# Patient Record
Sex: FEMALE | Race: BLACK OR AFRICAN AMERICAN | Employment: FULL TIME | URBAN - METROPOLITAN AREA
[De-identification: names, ages, dates, MRNs, and addresses within clinical notes are randomized per-mention and may not be internally consistent; named-entity substitution may affect disease eponyms.]

---

## 2019-07-16 ENCOUNTER — OFFICE VISIT (OUTPATIENT)
Dept: FAMILY MEDICINE CLINIC | Age: 64
End: 2019-07-16

## 2019-07-16 ENCOUNTER — HOSPITAL ENCOUNTER (OUTPATIENT)
Dept: LAB | Age: 64
Discharge: HOME | End: 2019-07-16
Payer: COMMERCIAL

## 2019-07-16 VITALS
RESPIRATION RATE: 20 BRPM | HEIGHT: 66 IN | BODY MASS INDEX: 32.3 KG/M2 | SYSTOLIC BLOOD PRESSURE: 154 MMHG | DIASTOLIC BLOOD PRESSURE: 96 MMHG | WEIGHT: 201 LBS | TEMPERATURE: 98.1 F | HEART RATE: 80 BPM | OXYGEN SATURATION: 98 %

## 2019-07-16 DIAGNOSIS — R60.0 BILATERAL LOWER EXTREMITY EDEMA: Primary | ICD-10-CM

## 2019-07-16 DIAGNOSIS — I10 ESSENTIAL HYPERTENSION: ICD-10-CM

## 2019-07-16 DIAGNOSIS — E66.9 OBESITY, CLASS I, BMI 30-34.9: ICD-10-CM

## 2019-07-16 LAB
ALBUMIN SERPL-MCNC: 3.7 G/DL (ref 3.4–5)
ALBUMIN/GLOB SERPL: 0.8 {RATIO} (ref 0.8–1.7)
ALP SERPL-CCNC: 124 U/L (ref 45–117)
ALT SERPL-CCNC: 29 U/L (ref 13–56)
ANION GAP SERPL CALC-SCNC: 5 MMOL/L (ref 3–18)
AST SERPL-CCNC: 22 U/L (ref 15–37)
BILIRUB SERPL-MCNC: 0.2 MG/DL (ref 0.2–1)
BUN SERPL-MCNC: 12 MG/DL (ref 7–18)
BUN/CREAT SERPL: 13 (ref 12–20)
CALCIUM SERPL-MCNC: 9.4 MG/DL (ref 8.5–10.1)
CHLORIDE SERPL-SCNC: 104 MMOL/L (ref 100–108)
CHOLEST SERPL-MCNC: 188 MG/DL
CO2 SERPL-SCNC: 30 MMOL/L (ref 21–32)
CREAT SERPL-MCNC: 0.89 MG/DL (ref 0.6–1.3)
EST. AVERAGE GLUCOSE BLD GHB EST-MCNC: 140 MG/DL
GLOBULIN SER CALC-MCNC: 4.6 G/DL (ref 2–4)
GLUCOSE SERPL-MCNC: 77 MG/DL (ref 74–99)
HBA1C MFR BLD: 6.5 % (ref 4.2–5.6)
HDLC SERPL-MCNC: 49 MG/DL (ref 40–60)
HDLC SERPL: 3.8 {RATIO} (ref 0–5)
LDLC SERPL CALC-MCNC: 105.6 MG/DL (ref 0–100)
LIPID PROFILE,FLP: ABNORMAL
POTASSIUM SERPL-SCNC: 4.1 MMOL/L (ref 3.5–5.5)
PROT SERPL-MCNC: 8.3 G/DL (ref 6.4–8.2)
SODIUM SERPL-SCNC: 139 MMOL/L (ref 136–145)
TRIGL SERPL-MCNC: 167 MG/DL (ref ?–150)
VLDLC SERPL CALC-MCNC: 33.4 MG/DL

## 2019-07-16 PROCEDURE — 80061 LIPID PANEL: CPT

## 2019-07-16 PROCEDURE — 80053 COMPREHEN METABOLIC PANEL: CPT

## 2019-07-16 PROCEDURE — 83036 HEMOGLOBIN GLYCOSYLATED A1C: CPT

## 2019-07-16 PROCEDURE — 36415 COLL VENOUS BLD VENIPUNCTURE: CPT

## 2019-07-16 RX ORDER — TRIAMTERENE/HYDROCHLOROTHIAZID 37.5-25 MG
1 TABLET ORAL DAILY
Qty: 30 TAB | Refills: 1 | Status: SHIPPED | OUTPATIENT
Start: 2019-07-16 | End: 2019-07-17 | Stop reason: SDUPTHER

## 2019-07-16 RX ORDER — FUROSEMIDE 20 MG/1
20 TABLET ORAL DAILY
Refills: 0 | COMMUNITY
Start: 2019-06-19 | End: 2019-06-29

## 2019-07-16 NOTE — PROGRESS NOTES
HISTORY OF PRESENT ILLNESS  Noble Dalal is a 61 y.o. female. HPI   Patient presents today to establish care and also further evaluation of bilateral leg swelling. Reports she has noticed erythema to bilateral lower extremities for the last couple of days. If has been improving. Patient states she was seen at [facility] ED 6/30/19. Patient states does not have put excessive salt on her food. However, she does eat frozen food and fried foods. BNP POC (LAB) (06/18/2019 8:22 PM EDT)  BNP POC (LAB) (06/18/2019 8:22 PM EDT)   Component Value Ref Range Performed At Pathologist Signature   BNP POC <15 0 - 50 pg/mL [facility] LABORATORY      BASIC METABOLIC PANEL (41/24/9966 8:20 PM EDT)  BASIC METABOLIC PANEL (37/97/2776 8:20 PM EDT)   Component Value Ref Range Performed At Pathologist Signature   Potassium 3.7 3.5 - 5.5 mmol/L [facility] LABORATORY     Sodium 140 133 - 145 mmol/L [facility]     Chloride 103 98 - 110 mmol/L [facility]     Glucose 94 70 - 99 mg/dL [facility]     Calcium 9.6 8.4 - 10.5 mg/dL [facility]     BUN 18 6 - 22 mg/dL [facility]     Creatinine 0.7 (L) 0.8 - 1.4 mg/dL [facility]     CO2 25 20 - 32 mmol/L [facility]     eGFR  >60.0 >60.0 [facility]     eGFR Non African American >60.0 >60.0 [facility]     Anion Gap 11.8 mmol/L [facility]AT[facility]       No Known Allergies  Current Outpatient Medications   Medication Sig Dispense Refill    amLODIPine (NORVASC) 10 mg tablet Take  by mouth daily.  triamterene-hydrochlorothiazide (MAXZIDE) 75-50 mg per tablet Take  by mouth daily.        Past Medical History:   Diagnosis Date    Essential hypertension, benign      Social History     Socioeconomic History    Marital status:  Spouse name: Not on file    Number of children: Not on file    Years of education: Not on file    Highest education level: Not on file   Occupational History    Not on file   Social Needs    Financial resource strain: Not on file    Food insecurity:     Worry: Not on file     Inability: Not on file    Transportation needs:     Medical: Not on file     Non-medical: Not on file   Tobacco Use    Smoking status: Never Smoker    Smokeless tobacco: Never Used   Substance and Sexual Activity    Alcohol use: No    Drug use: Never    Sexual activity: Not on file   Lifestyle    Physical activity:     Days per week: Not on file     Minutes per session: Not on file    Stress: Not on file   Relationships    Social connections:     Talks on phone: Not on file     Gets together: Not on file     Attends Sikhism service: Not on file     Active member of club or organization: Not on file     Attends meetings of clubs or organizations: Not on file     Relationship status: Not on file    Intimate partner violence:     Fear of current or ex partner: Not on file     Emotionally abused: Not on file     Physically abused: Not on file     Forced sexual activity: Not on file   Other Topics Concern    Not on file   Social History Narrative    Not on file     Wt Readings from Last 3 Encounters:   07/16/19 201 lb (91.2 kg)   12/17/09 191 lb (86.6 kg)     BP Readings from Last 3 Encounters:   07/16/19 163/79   12/17/09 (!) 140/94     Review of Systems   Constitutional: Negative for chills and fever. Respiratory: Negative for shortness of breath. Cardiovascular: Positive for leg swelling (bilateral lower extremities ). Negative for chest pain and palpitations. Neurological: Negative for dizziness and headaches.      /79 (BP 1 Location: Left arm, BP Patient Position: Sitting)   Pulse 80   Temp 98.1 °F (36.7 °C) (Oral)   Resp 20   Ht 5' 6\" (1.676 m)   Wt 201 lb (91.2 kg)   SpO2 98%   BMI 32.44 kg/m² Physical Exam   Constitutional: She is oriented to person, place, and time. She appears well-developed and well-nourished. No distress. HENT:   Head: Normocephalic and atraumatic. Neck: Normal range of motion. Neck supple. Cardiovascular: Normal rate, regular rhythm and normal heart sounds. Exam reveals no gallop and no friction rub. No murmur heard. Pulmonary/Chest: Effort normal and breath sounds normal. She has no wheezes. She has no rhonchi. She has no rales. Abdominal: Soft. Bowel sounds are normal. There is no tenderness. Musculoskeletal: She exhibits edema (bilateral lower extremities ). Lymphadenopathy:     She has no cervical adenopathy. Neurological: She is alert and oriented to person, place, and time. Skin: Skin is warm and dry. Psychiatric: She has a normal mood and affect. Her behavior is normal. Judgment and thought content normal.       ASSESSMENT and PLAN    ICD-10-CM ICD-9-CM    1. Bilateral lower extremity edema R60.0 782.3    2. Essential hypertension I10 401.9 LIPID PANEL      METABOLIC PANEL, COMPREHENSIVE   3. Obesity, Class I, BMI 30-34.9 E66.9 278.00 HEMOGLOBIN A1C WITH EAG     Orders Placed This Encounter    LIPID PANEL    METABOLIC PANEL, COMPREHENSIVE    HEMOGLOBIN A1C WITH EAG    furosemide (LASIX) 20 mg tablet    DISCONTD: triamterene-hydroCHLOROthiazide (MAXZIDE) 37.5-25 mg per tablet    triamterene-hydroCHLOROthiazide (MAXZIDE) 37.5-25 mg per tablet     I have discussed the diagnosis with the patient and the intended plan as seen in the above orders. The patient has received an after-visit summary and questions were answered concerning future plans. I have discussed medication side effects and warnings with the patient as well. Patient agreeable with above plan and verbalizes understanding. Follow-up and Dispositions    · Return in about 2 weeks (around 7/30/2019) for HTN/lower leg edema.

## 2019-07-16 NOTE — PROGRESS NOTES
Chief Complaint   Patient presents with   [facility] Follow Up     [facility] ER follow up. Bilateral leg swelling. was given 10 day supply of Lasix. 1. Have you been to the ER, urgent care clinic since your last visit? Hospitalized since your last visit?here for follow up    2. Have you seen or consulted any other health care providers outside of the [address] [place] [address] since your last visit? Include any pap smears or colon screening. Learning assessment completed  Abuse screening completed  PHq screening completed.

## 2019-07-16 NOTE — PATIENT INSTRUCTIONS
Low Sodium Diet (2,000 Milligram): Care Instructions  Your Care Instructions    Too much sodium causes your body to hold on to extra water. This can raise your blood pressure and force your heart and kidneys to work harder. In very serious cases, this could cause you to be put in the hospital. It might even be life-threatening. By limiting sodium, you will feel better and lower your risk of serious problems. The most common source of sodium is salt. People get most of the salt in their diet from canned, prepared, and packaged foods. Fast food and restaurant meals also are very high in sodium. Your doctor will probably limit your sodium to less than 2,000 milligrams (mg) a day. This limit counts all the sodium in prepared and packaged foods and any salt you add to your food. Follow-up care is a key part of your treatment and safety. Be sure to make and go to all appointments, and call your doctor if you are having problems. It's also a good idea to know your test results and keep a list of the medicines you take. How can you care for yourself at home? Read food labels  · Read labels on cans and food packages. The labels tell you how much sodium is in each serving. Make sure that you look at the serving size. If you eat more than the serving size, you have eaten more sodium. · Food labels also tell you the Percent Daily Value for sodium. Choose products with low Percent Daily Values for sodium. · Be aware that sodium can come in forms other than salt, including monosodium glutamate (MSG), sodium citrate, and sodium bicarbonate (baking soda). MSG is often added to Asian food. When you eat out, you can sometimes ask for food without MSG or added salt. Buy low-sodium foods  · Buy foods that are labeled \"unsalted\" (no salt added), \"sodium-free\" (less than 5 mg of sodium per serving), or \"low-sodium\" (less than 140 mg of sodium per serving).  Foods labeled \"reduced-sodium\" and \"light sodium\" may still have too much sodium. Be sure to read the label to see how much sodium you are getting. · Buy fresh vegetables, or frozen vegetables without added sauces. Buy low-sodium versions of canned vegetables, soups, and other canned goods. Prepare low-sodium meals  · Cut back on the amount of salt you use in cooking. This will help you adjust to the taste. Do not add salt after cooking. One teaspoon of salt has about 2,300 mg of sodium. · Take the salt shaker off the table. · Flavor your food with garlic, lemon juice, onion, vinegar, herbs, and spices. Do not use soy sauce, lite soy sauce, steak sauce, onion salt, garlic salt, celery salt, mustard, or ketchup on your food. · Use low-sodium salad dressings, sauces, and ketchup. Or make your own salad dressings and sauces without adding salt. · Use less salt (or none) when recipes call for it. You can often use half the salt a recipe calls for without losing flavor. Other foods such as rice, pasta, and grains do not need added salt. · Rinse canned vegetables, and cook them in fresh water. This removes some--but not all--of the salt. · Avoid water that is naturally high in sodium or that has been treated with water softeners, which add sodium. Call your local water company to find out the sodium content of your water supply. If you buy bottled water, read the label and choose a sodium-free brand. Avoid high-sodium foods  · Avoid eating:  ? Smoked, cured, salted, and canned meat, fish, and poultry. ? Ham, gibson, hot dogs, and luncheon meats. ? Regular, hard, and processed cheese and regular peanut butter. ? Crackers with salted tops, and other salted snack foods such as pretzels, chips, and salted popcorn. ? Frozen prepared meals, unless labeled low-sodium. ? Canned and dried soups, broths, and bouillon, unless labeled sodium-free or low-sodium. ? Canned vegetables, unless labeled sodium-free or low-sodium. ? Western Jeanne fries, pizza, tacos, and other fast foods.   ? Becky Bernabe olives, ketchup, and other condiments, especially soy sauce, unless labeled sodium-free or low-sodium. Where can you learn more? Go to http://georgi-xuan.info/. Enter H470 in the search box to learn more about \"Low Sodium Diet (2,000 Milligram): Care Instructions. \"  Current as of: March 28, 2018  Content Version: 11.9  © 9864-0643 [employer]. Care instructions adapted under license by [employer] (which disclaims liability or warranty for this information). If you have questions about a medical condition or this instruction, always ask your healthcare professional. [address] any warranty or liability for your use of this information. How to Read a Food Label to Limit Sodium: Care Instructions  Your Care Instructions  Sodium causes your body to hold on to extra water. This can raise your blood pressure and force your heart and kidneys to work harder. In very serious cases, this could cause you to be put in the hospital. It might even be life-threatening. By limiting sodium, you will feel better and lower your risk of serious problems. Processed foods, fast food, and restaurant foods are the major sources of dietary sodium. The most common name for sodium is salt. Try to limit how much sodium you eat to less than 2,300 milligrams (mg) a day. If you limit your sodium to 1,500 mg a day, you can lower your blood pressure even more. This limit counts all the salt that you eat in foods you cook or in packaged foods. Keep a list of everything you eat and drink. Follow-up care is a key part of your treatment and safety. Be sure to make and go to all appointments, and call your doctor if you are having problems. It's also a good idea to know your test results and keep a list of the medicines you take. How can you care for yourself at home?   Read ingredient lists on food labels  · Read the list of ingredients on food labels to help you find how much sodium is in a food. The label lists the ingredients in a food in descending order (from the most to the least). If salt or sodium is high on the list, there may be a lot of sodium in the food. · Know that sodium has different names. Sodium is also called monosodium glutamate (MSG, common in [place] food), sodium citrate, sodium alginate, sodium hydroxide, and sodium phosphate. Read Nutrition Facts labels  · On most foods, there is a Nutrition Facts label. This will tell you how much sodium is in one serving of food. Look at both the serving size and the sodium amount. The serving size is located at the top of the label, usually right under the \"Nutrition Facts\" title. The amount of sodium is given in the list under the title. It is given in milligrams (mg). ? Check the serving size carefully. A single serving is often very small, and you may eat more than one serving. If this is the case, you will eat more sodium than listed on the label. For example, if the serving size for a canned soup is 1 cup and the sodium amount is 470 mg, if you have 2 cups you will eat 940 mg of sodium. · The nutrition facts for fresh fruits and vegetables are not listed on the food. They may be listed somewhere in the store. These foods usually have no sodium or low sodium. · The Nutrition Facts label also gives you the Percent Daily Value for sodium. This is how much of the recommended amount of sodium a serving contains. The daily value for sodium is less than 2,300 mg. So if the Percent Daily Value says 50%, this means one serving is giving you half of this, or 1,150 mg. Buy low-sodium foods  · Look for foods that are made with less sodium. Watch for the following words on the label. ? \"Unsalted\" means there is no sodium added to the food. But there may be sodium already in the food naturally. ? \"Sodium-free\" means a serving has less than 5 mg of sodium. ?  \"Very low sodium\" means a serving has 35 mg or less of sodium. ? \"Low-sodium\" means a serving has 140 mg or less of sodium. · \"Reduced-sodium\" means that there is 25% less sodium than what the food normally has. This is still usually too much sodium. Try not to buy foods with this on the label. · Buy fresh vegetables, or frozen vegetables without added sauces. Buy low-sodium versions of canned vegetables, soups, and other canned goods. Where can you learn more? Go to http://georgi-xuan.info/. Enter 26 [id number] in the search box to learn more about \"How to Read a Food Label to Limit Sodium: Care Instructions. \"  Current as of: March 28, 2018  Content Version: 11.9  © 0731-4632 [employer]. Care instructions adapted under license by [employer] (which disclaims liability or warranty for this information). If you have questions about a medical condition or this instruction, always ask your healthcare professional. [place] [address] any warranty or liability for your use of this information. Leg and Ankle Edema: Care Instructions  Your Care Instructions  Swelling in the legs, ankles, and feet is called edema. It is common after you sit or stand for a while. Long plane flights or car rides often cause swelling in the legs and feet. You may also have swelling if you have to stand for long periods of time at your job. Problems with the veins in the legs (varicose veins) and changes in hormones can also cause swelling. Sometimes the swelling in the ankles and feet is caused by a more serious problem, such as heart failure, infection, blood clots, or liver or kidney disease. Follow-up care is a key part of your treatment and safety. Be sure to make and go to all appointments, and call your doctor if you are having problems. It's also a good idea to know your test results and keep a list of the medicines you take. How can you care for yourself at home? · If your doctor gave you medicine, take it as prescribed. Call your doctor if you think you are having a problem with your medicine. · Whenever you are resting, raise your legs up. Try to keep the swollen area higher than the level of your heart. · Take breaks from standing or sitting in one position. ? Walk around to increase the blood flow in your lower legs. ? Move your feet and ankles often while you stand, or tighten and relax your leg muscles. · Wear support stockings. Put them on in the morning, before swelling gets worse. · Eat a balanced diet. Lose weight if you need to. · Limit the amount of salt (sodium) in your diet. Salt holds fluid in the body and may increase swelling. When should you call for help? Call 911 anytime you think you may need emergency care. For example, call if:    · You have symptoms of a blood clot in your lung (called a pulmonary embolism). These may include:  ? Sudden chest pain. ? Trouble breathing. ? Coughing up blood.    Call your doctor now or seek immediate medical care if:    · You have signs of a blood clot, such as:  ? Pain in your calf, back of the knee, thigh, or groin. ? Redness and swelling in your leg or groin.     · You have symptoms of infection, such as:  ? Increased pain, swelling, warmth, or redness. ? Red streaks or pus. ? A fever.    Watch closely for changes in your health, and be sure to contact your doctor if:    · Your swelling is getting worse.     · You have new or worsening pain in your legs.     · You do not get better as expected. Where can you learn more? Go to http://georgi-xuan.info/. Enter A801 in the search box to learn more about \"Leg and Ankle Edema: Care Instructions. \"  Current as of: September 23, 2018  Content Version: 11.9  © 6437-5459 [employer]. Care instructions adapted under license by [employer] (which disclaims liability or warranty for this information).  If you have questions about a medical condition or this instruction, always ask your healthcare professional. [place] [address] any warranty or liability for your use of this information.

## 2019-07-17 RX ORDER — TRIAMTERENE/HYDROCHLOROTHIAZID 37.5-25 MG
1 TABLET ORAL DAILY
Qty: 90 TAB | Refills: 0 | Status: SHIPPED | OUTPATIENT
Start: 2019-07-17 | End: 2019-10-28 | Stop reason: SDUPTHER

## 2019-08-06 ENCOUNTER — HOSPITAL ENCOUNTER (OUTPATIENT)
Dept: GENERAL RADIOLOGY | Age: 64
Discharge: HOME | End: 2019-08-06
Payer: COMMERCIAL

## 2019-08-06 ENCOUNTER — OFFICE VISIT (OUTPATIENT)
Dept: FAMILY MEDICINE CLINIC | Age: 64
End: 2019-08-06

## 2019-08-06 VITALS
DIASTOLIC BLOOD PRESSURE: 79 MMHG | BODY MASS INDEX: 31.82 KG/M2 | WEIGHT: 198 LBS | HEART RATE: 80 BPM | RESPIRATION RATE: 17 BRPM | HEIGHT: 66 IN | OXYGEN SATURATION: 98 % | TEMPERATURE: 98.1 F | SYSTOLIC BLOOD PRESSURE: 149 MMHG

## 2019-08-06 DIAGNOSIS — R73.09 ELEVATED HEMOGLOBIN A1C: ICD-10-CM

## 2019-08-06 DIAGNOSIS — G89.29 CHRONIC BILATERAL THORACIC BACK PAIN: ICD-10-CM

## 2019-08-06 DIAGNOSIS — I10 ESSENTIAL HYPERTENSION: Primary | ICD-10-CM

## 2019-08-06 DIAGNOSIS — M54.6 CHRONIC BILATERAL THORACIC BACK PAIN: ICD-10-CM

## 2019-08-06 PROCEDURE — 72072 X-RAY EXAM THORAC SPINE 3VWS: CPT

## 2019-08-06 RX ORDER — IBUPROFEN 800 MG/1
800 TABLET ORAL
Qty: 45 TAB | Refills: 0 | Status: SHIPPED | OUTPATIENT
Start: 2019-08-06 | End: 2021-12-16

## 2019-08-06 NOTE — LETTER
8/6/2019 9:45 AM 
 
Ms. Josef Roldan [address] [address] [address] [address] To Whom It May Concern: 
 
Jose[facility]an [facility] the [facility] Please be advised patient will benefit from wearing closed toe clog while working due to bilateral lower leg/feet swelling. If there are questions or concerns please have the patient contact our office.  
 
 
 
Sincerely, 
 
 
Camelia Telles NP

## 2019-08-06 NOTE — PROGRESS NOTES
Leonarda First presents today for   Chief Complaint   Patient presents with    Hypertension    Swelling       Is someone accompanying this pt? no    Is the patient using any DME equipment during OV? no    Depression Screening:  3 most recent PHQ Screens 7/16/2019   Little interest or pleasure in doing things Not at all   Feeling down, depressed, irritable, or hopeless Not at all   Total Score PHQ 2 0       Learning Assessment:  Learning Assessment 7/16/2019   PRIMARY LEARNER Patient   HIGHEST LEVEL OF EDUCATION - PRIMARY LEARNER  GRADUATED HIGH SCHOOL OR GED   BARRIERS PRIMARY LEARNER NONE   CO-LEARNER CAREGIVER No   PRIMARY LANGUAGE ENGLISH   LEARNER PREFERENCE PRIMARY DEMONSTRATION   ANSWERED BY Patient   RELATIONSHIP SELF       Abuse Screening:  Abuse Screening Questionnaire 7/16/2019   Do you ever feel afraid of your partner? N   Are you in a relationship with someone who physically or mentally threatens you? N   Is it safe for you to go home? Y       Fall Risk  No flowsheet data found. Health Maintenance reviewed and discussed and ordered per Provider. Health Maintenance Due   Topic Date Due    Hepatitis C Screening  1955    FOOT EXAM Q1  12/17/1965    MICROALBUMIN Q1  12/17/1965    EYE EXAM RETINAL OR DILATED  12/17/1965    DTaP/Tdap/Td series (1 - Tdap) 12/17/1976    PAP AKA CERVICAL CYTOLOGY  12/17/1976    Shingrix Vaccine Age 50> (1 of 2) 12/17/2005    FOBT Q 1 YEAR AGE 50-75  12/17/2005    BREAST CANCER SCRN MAMMOGRAM  01/27/2014    Influenza Age 9 to Adult  08/01/2019       Coordination of Care:  1. Have you been to the ER, urgent care clinic since your last visit? Hospitalized since your last visit? no    2. Have you seen or consulted any other health care providers outside of the [address] [place] [address] since your last visit? Include any pap smears or colon screening.  no

## 2019-08-06 NOTE — PATIENT INSTRUCTIONS
Learning About Low-Carbohydrate Diets for Weight Loss  What is a low-carbohydrate diet? Low-carb diets avoid foods that are high in carbohydrate. These high-carb foods include pasta, bread, rice, cereal, fruits, and starchy vegetables. Instead, these diets usually have you eat foods that are high in fat and protein. Many people lose weight quickly on a low-carb diet. But the early weight loss is water. People on this diet often gain the weight back after they start eating carbs again. Not all diet plans are safe or work well. A lot of the evidence shows that low-carb diets aren't healthy. That's because these diets often don't include healthy foods like fruits and vegetables. Losing weight safely means balancing protein, fat, and carbs with every meal and snack. And low-carb diets don't always provide the vitamins, minerals, and fiber you need. If you have a serious medical condition, talk to your doctor before you try any diet. These conditions include kidney disease, heart disease, type 2 diabetes, high cholesterol, and high blood pressure. If you are pregnant, it may not be safe for your baby if you are on a low-carb diet. How can you lose weight safely? You might have heard that a diet plan helped another person lose weight. But that doesn't mean that it will work for you. It is very hard to stay on a diet that includes lots of big changes in your eating habits. If you want to get to a healthy weight and stay there, making healthy lifestyle changes will often work better than dieting. These steps can help. · Make a plan for change. Work with your doctor to create a plan that is right for you. · See a dietitian. He or she can show you how to make healthy changes in your eating habits. · Manage stress. If you have a lot of stress in your life, it can be hard to focus on making healthy changes to your daily habits. · Track your food and activity.  You are likely to do better at losing weight if you keep track of what you eat and what you do. Follow-up care is a key part of your treatment and safety. Be sure to make and go to all appointments, and call your doctor if you are having problems. It's also a good idea to know your test results and keep a list of the medicines you take. Where can you learn more? Go to http://georgi-xuan.info/. Enter A121 in the search box to learn more about \"Learning About Low-Carbohydrate Diets for Weight Loss. \"  Current as of: November 7, 2018  Content Version: 12.1  © 8845-1185 [employer]. Care instructions adapted under license by [employer] (which disclaims liability or warranty for this information). If you have questions about a medical condition or this instruction, always ask your healthcare professional. [address] any warranty or liability for your use of this information.

## 2019-08-12 ENCOUNTER — TELEPHONE (OUTPATIENT)
Dept: FAMILY MEDICINE CLINIC | Age: 64
End: 2019-08-12

## 2019-08-12 NOTE — TELEPHONE ENCOUNTER
Patient came into the office today requesting xray results. Advised patient that arthritis and mild scoliosis was noted. She verbalized understanding.  Copy of results given to the patient per her request. Patient left ambulatory with no complaints of pain or distress noted at this time

## 2019-08-12 NOTE — TELEPHONE ENCOUNTER
Result Notes for XR SPINE [address] 3 V     Notes recorded by Danyell Whittington NP on 8/8/2019 at 7:31 AM EDT  Please inform patient her xray reveals she has arthritis and also mild scoliosis.  Thanks!

## 2019-10-10 ENCOUNTER — OFFICE VISIT (OUTPATIENT)
Dept: FAMILY MEDICINE CLINIC | Age: 64
End: 2019-10-10

## 2019-10-10 VITALS
OXYGEN SATURATION: 98 % | HEIGHT: 66 IN | RESPIRATION RATE: 17 BRPM | HEART RATE: 80 BPM | TEMPERATURE: 98.1 F | BODY MASS INDEX: 31.82 KG/M2 | DIASTOLIC BLOOD PRESSURE: 83 MMHG | SYSTOLIC BLOOD PRESSURE: 137 MMHG | WEIGHT: 198 LBS

## 2019-10-10 DIAGNOSIS — R73.09 ELEVATED HEMOGLOBIN A1C: ICD-10-CM

## 2019-10-10 DIAGNOSIS — I10 ESSENTIAL HYPERTENSION: ICD-10-CM

## 2019-10-10 DIAGNOSIS — R60.0 BILATERAL LOWER EXTREMITY EDEMA: Primary | ICD-10-CM

## 2019-10-10 DIAGNOSIS — E66.9 OBESITY, CLASS I, BMI 30-34.9: ICD-10-CM

## 2019-10-10 RX ORDER — DICLOFENAC SODIUM 10 MG/G
GEL TOPICAL 4 TIMES DAILY
Qty: 100 G | Refills: 0 | Status: SHIPPED | OUTPATIENT
Start: 2019-10-10

## 2019-10-10 NOTE — PROGRESS NOTES
Subjective:   Suri Boswell is a 61 y.o. female with hypertension presents for follow up. States she does continue to have swelling. Comments she does wear her support stockings with some improvement. Patient states since her last visit she had to go to the emergency department for bilateral lower leg edema. Dx with venous insuffiencey. Patient states she has been attempting to adhere to low sodium diet. She has not been exercising. There is no problem list on file for this patient. Current Outpatient Medications   Medication Sig Dispense Refill    ibuprofen (MOTRIN) 800 mg tablet Take 1 Tab by mouth every eight (8) hours as needed for Pain (with food). 45 Tab 0    triamterene-hydroCHLOROthiazide (MAXZIDE) 37.5-25 mg per tablet Take 1 Tab by mouth daily. 90 Tab 0      No Known Allergies  No past surgical history on file. Family History   Problem Relation Age of Onset    Hypertension Unknown       Lab Results   Component Value Date/Time    Cholesterol, total 188 07/16/2019 12:35 PM    HDL Cholesterol 49 07/16/2019 12:35 PM    LDL, calculated 105.6 (H) 07/16/2019 12:35 PM    VLDL, calculated 33.4 07/16/2019 12:35 PM    Triglyceride 167 (H) 07/16/2019 12:35 PM    CHOL/HDL Ratio 3.8 07/16/2019 12:35 PM     Lab Results   Component Value Date/Time    Sodium 139 07/16/2019 12:35 PM    Potassium 4.1 07/16/2019 12:35 PM    Chloride 104 07/16/2019 12:35 PM    CO2 30 07/16/2019 12:35 PM    Anion gap 5 07/16/2019 12:35 PM    Glucose 77 07/16/2019 12:35 PM    BUN 12 07/16/2019 12:35 PM    Creatinine 0.89 07/16/2019 12:35 PM    BUN/Creatinine ratio 13 07/16/2019 12:35 PM    GFR est AA >60 07/16/2019 12:35 PM    GFR est non-AA >60 07/16/2019 12:35 PM    Calcium 9.4 07/16/2019 12:35 PM    Bilirubin, total 0.2 07/16/2019 12:35 PM    AST (SGOT) 22 07/16/2019 12:35 PM    Alk.  phosphatase 124 (H) 07/16/2019 12:35 PM    Protein, total 8.3 (H) 07/16/2019 12:35 PM    Albumin 3.7 07/16/2019 12:35 PM    Globulin 4.6 (H) 07/16/2019 12:35 PM    A-G Ratio 0.8 07/16/2019 12:35 PM    ALT (SGPT) 29 07/16/2019 12:35 PM     Lab Results   Component Value Date/Time    Hemoglobin A1c 6.5 (H) 07/16/2019 12:35 PM     Wt Readings from Last 3 Encounters:   10/10/19 198 lb (89.8 kg)   08/06/19 198 lb (89.8 kg)   07/16/19 201 lb (91.2 kg)     BP Readings from Last 3 Encounters:   10/10/19 137/83   08/06/19 149/79   07/16/19 (!) 154/96       Hypertension ROS: taking medications as instructed, no medication side effects noted, no TIA's, no chest pain on exertion, no dyspnea on exertion, no swelling of ankles. Review of Systems - Musculoskeletal ROS: positive for - pain in back - mid    New concerns: states she does continue to have pain in her thoracic/lower back region. Objective:   Visit Vitals  /83   Pulse 80   Temp 98.1 °F (36.7 °C) (Oral)   Resp 17   Ht 5' 6\" (1.676 m)   Wt 198 lb (89.8 kg)   SpO2 98%   BMI 31.96 kg/m²      General appearance - alert, well appearing, and in no distress  Neck - supple, no significant adenopathy, carotids upstroke normal bilaterally, no bruits  Chest - clear to auscultation, no wheezes, rales or rhonchi, symmetric air entry  Heart - normal rate, regular rhythm, normal S1, S2, no murmurs, rubs, clicks or gallops  Extremities - peripheral pulses normal, no clubbing or cyanosis, bilateral lower extremity edema          Assessment/Plan:      ICD-10-CM ICD-9-CM    1. Bilateral lower extremity edema R60.0 782.3    2. Elevated hemoglobin A1c R73.09 790.29 AMB POC HEMOGLOBIN A1C   3. Essential hypertension I10 401.9    4. Obesity, Class I, BMI 30-34.9 E66.9 278.00      Orders Placed This Encounter    AMB POC HEMOGLOBIN A1C    diclofenac (VOLTAREN) 1 % gel       current treatment plan is effective, no change in therapy  reviewed diet, exercise and weight control  recommended sodium restriction. I have discussed the diagnosis with the patient and the intended plan as seen in the above orders.   The patient has received an after-visit summary and questions were answered concerning future plans. I have discussed medication side effects and warnings with the patient as well. Patient agreeable with above plan and verbalizes understanding. Follow-up and Dispositions    · Return in about 3 months (around 1/10/2020) for HTN/edema.

## 2019-10-10 NOTE — PROGRESS NOTES
Benito Steen presents today for   Chief Complaint   Patient presents with    Hypertension    Blood sugar problem       Is someone accompanying this pt? no    Is the patient using any DME equipment during OV? no    Depression Screening:  3 most recent PHQ Screens 7/16/2019   Little interest or pleasure in doing things Not at all   Feeling down, depressed, irritable, or hopeless Not at all   Total Score PHQ 2 0       Learning Assessment:  Learning Assessment 7/16/2019   PRIMARY LEARNER Patient   HIGHEST LEVEL OF EDUCATION - PRIMARY LEARNER  GRADUATED HIGH SCHOOL OR GED   BARRIERS PRIMARY LEARNER NONE   CO-LEARNER CAREGIVER No   PRIMARY LANGUAGE ENGLISH   LEARNER PREFERENCE PRIMARY DEMONSTRATION   ANSWERED BY Patient   RELATIONSHIP SELF       Abuse Screening:  Abuse Screening Questionnaire 7/16/2019   Do you ever feel afraid of your partner? N   Are you in a relationship with someone who physically or mentally threatens you? N   Is it safe for you to go home? Y       Fall Risk  No flowsheet data found. Health Maintenance reviewed and discussed and ordered per Provider. Health Maintenance Due   Topic Date Due    Hepatitis C Screening  1955    FOOT EXAM Q1  12/17/1965    MICROALBUMIN Q1  12/17/1965    EYE EXAM RETINAL OR DILATED  12/17/1965    DTaP/Tdap/Td series (1 - Tdap) 12/17/1976    PAP AKA CERVICAL CYTOLOGY  12/17/1976    Shingrix Vaccine Age 50> (1 of 2) 12/17/2005    FOBT Q 1 YEAR AGE 50-75  12/17/2005    BREAST CANCER SCRN MAMMOGRAM  01/27/2014    Influenza Age 9 to Adult  08/01/2019           Coordination of Care:  1. Have you been to the ER, urgent care clinic since your last visit? Hospitalized since your last visit? no    2. Have you seen or consulted any other health care providers outside of the [address] [place] [address] since your last visit? Include any pap smears or colon screening.  no

## 2019-10-10 NOTE — PATIENT INSTRUCTIONS

## 2019-10-11 LAB — HBA1C MFR BLD HPLC: 5.5 %

## 2019-10-28 RX ORDER — TRIAMTERENE/HYDROCHLOROTHIAZID 37.5-25 MG
1 TABLET ORAL DAILY
Qty: 90 TAB | Refills: 0 | Status: SHIPPED | OUTPATIENT
Start: 2019-10-28 | End: 2020-01-09 | Stop reason: SDUPTHER

## 2019-10-28 NOTE — TELEPHONE ENCOUNTER
Requested Prescriptions     Pending Prescriptions Disp Refills    triamterene-hydroCHLOROthiazide (MAXZIDE) 37.5-25 mg per tablet 90 Tab 0     Sig: Take 1 Tab by mouth daily.

## 2019-12-09 RX ORDER — TRIAMTERENE/HYDROCHLOROTHIAZID 37.5-25 MG
1 TABLET ORAL DAILY
Qty: 90 TAB | Refills: 0 | Status: CANCELLED | OUTPATIENT
Start: 2019-12-09

## 2020-01-09 ENCOUNTER — HOSPITAL ENCOUNTER (OUTPATIENT)
Dept: LAB | Age: 65
Discharge: HOME | End: 2020-01-09
Payer: COMMERCIAL

## 2020-01-09 ENCOUNTER — OFFICE VISIT (OUTPATIENT)
Dept: FAMILY MEDICINE CLINIC | Age: 65
End: 2020-01-09

## 2020-01-09 VITALS
DIASTOLIC BLOOD PRESSURE: 84 MMHG | RESPIRATION RATE: 18 BRPM | BODY MASS INDEX: 32.14 KG/M2 | WEIGHT: 200 LBS | SYSTOLIC BLOOD PRESSURE: 144 MMHG | HEIGHT: 66 IN | HEART RATE: 85 BPM | TEMPERATURE: 98.3 F | OXYGEN SATURATION: 98 %

## 2020-01-09 DIAGNOSIS — R73.09 ELEVATED HEMOGLOBIN A1C: ICD-10-CM

## 2020-01-09 DIAGNOSIS — I10 ESSENTIAL HYPERTENSION: ICD-10-CM

## 2020-01-09 DIAGNOSIS — R60.0 BILATERAL LOWER EXTREMITY EDEMA: ICD-10-CM

## 2020-01-09 DIAGNOSIS — I10 ESSENTIAL HYPERTENSION: Primary | ICD-10-CM

## 2020-01-09 DIAGNOSIS — J00 ACUTE NASOPHARYNGITIS: ICD-10-CM

## 2020-01-09 LAB
ALBUMIN SERPL-MCNC: 3.4 G/DL (ref 3.4–5)
ALBUMIN/GLOB SERPL: 0.7 {RATIO} (ref 0.8–1.7)
ALP SERPL-CCNC: 104 U/L (ref 45–117)
ALT SERPL-CCNC: 30 U/L (ref 13–56)
ANION GAP SERPL CALC-SCNC: 3 MMOL/L (ref 3–18)
AST SERPL-CCNC: 26 U/L (ref 10–38)
BILIRUB SERPL-MCNC: 0.3 MG/DL (ref 0.2–1)
BUN SERPL-MCNC: 17 MG/DL (ref 7–18)
BUN/CREAT SERPL: 16 (ref 12–20)
CALCIUM SERPL-MCNC: 9.4 MG/DL (ref 8.5–10.1)
CHLORIDE SERPL-SCNC: 105 MMOL/L (ref 100–111)
CHOLEST SERPL-MCNC: 150 MG/DL
CO2 SERPL-SCNC: 30 MMOL/L (ref 21–32)
CREAT SERPL-MCNC: 1.05 MG/DL (ref 0.6–1.3)
EST. AVERAGE GLUCOSE BLD GHB EST-MCNC: 134 MG/DL
GLOBULIN SER CALC-MCNC: 4.7 G/DL (ref 2–4)
GLUCOSE SERPL-MCNC: 85 MG/DL (ref 74–99)
HBA1C MFR BLD: 6.3 % (ref 4.2–5.6)
HDLC SERPL-MCNC: 47 MG/DL (ref 40–60)
HDLC SERPL: 3.2 {RATIO} (ref 0–5)
LDLC SERPL CALC-MCNC: 85.8 MG/DL (ref 0–100)
LIPID PROFILE,FLP: NORMAL
POTASSIUM SERPL-SCNC: 4.1 MMOL/L (ref 3.5–5.5)
PROT SERPL-MCNC: 8.1 G/DL (ref 6.4–8.2)
SODIUM SERPL-SCNC: 138 MMOL/L (ref 136–145)
TRIGL SERPL-MCNC: 86 MG/DL (ref ?–150)
VLDLC SERPL CALC-MCNC: 17.2 MG/DL

## 2020-01-09 PROCEDURE — 83036 HEMOGLOBIN GLYCOSYLATED A1C: CPT

## 2020-01-09 PROCEDURE — 80061 LIPID PANEL: CPT

## 2020-01-09 PROCEDURE — 80053 COMPREHEN METABOLIC PANEL: CPT

## 2020-01-09 PROCEDURE — 36415 COLL VENOUS BLD VENIPUNCTURE: CPT

## 2020-01-09 RX ORDER — TRIAMTERENE/HYDROCHLOROTHIAZID 37.5-25 MG
1 TABLET ORAL DAILY
Qty: 90 TAB | Refills: 1 | Status: SHIPPED | OUTPATIENT
Start: 2020-01-09 | End: 2020-07-08 | Stop reason: SDUPTHER

## 2020-01-09 NOTE — PROGRESS NOTES
Subjective:   Conor Chong is a 59 y.o. female with hypertension presents for 3 months follow up. There is no problem list on file for this patient. Current Outpatient Medications   Medication Sig Dispense Refill    triamterene-hydroCHLOROthiazide (MAXZIDE) 37.5-25 mg per tablet Take 1 Tab by mouth daily. 90 Tab 0    diclofenac (VOLTAREN) 1 % gel Apply  to affected area four (4) times daily. 100 g 0    ibuprofen (MOTRIN) 800 mg tablet Take 1 Tab by mouth every eight (8) hours as needed for Pain (with food). 39 Tab 0      No Known Allergies  No past surgical history on file. Family History   Problem Relation Age of Onset    Hypertension Unknown       Lab Results   Component Value Date/Time    Cholesterol, total 188 07/16/2019 12:35 PM    HDL Cholesterol 49 07/16/2019 12:35 PM    LDL, calculated 105.6 (H) 07/16/2019 12:35 PM    VLDL, calculated 33.4 07/16/2019 12:35 PM    Triglyceride 167 (H) 07/16/2019 12:35 PM    CHOL/HDL Ratio 3.8 07/16/2019 12:35 PM     Lab Results   Component Value Date/Time    Sodium 139 07/16/2019 12:35 PM    Potassium 4.1 07/16/2019 12:35 PM    Chloride 104 07/16/2019 12:35 PM    CO2 30 07/16/2019 12:35 PM    Anion gap 5 07/16/2019 12:35 PM    Glucose 77 07/16/2019 12:35 PM    BUN 12 07/16/2019 12:35 PM    Creatinine 0.89 07/16/2019 12:35 PM    BUN/Creatinine ratio 13 07/16/2019 12:35 PM    GFR est AA >60 07/16/2019 12:35 PM    GFR est non-AA >60 07/16/2019 12:35 PM    Calcium 9.4 07/16/2019 12:35 PM    Bilirubin, total 0.2 07/16/2019 12:35 PM    AST (SGOT) 22 07/16/2019 12:35 PM    Alk.  phosphatase 124 (H) 07/16/2019 12:35 PM    Protein, total 8.3 (H) 07/16/2019 12:35 PM    Albumin 3.7 07/16/2019 12:35 PM    Globulin 4.6 (H) 07/16/2019 12:35 PM    A-G Ratio 0.8 07/16/2019 12:35 PM    ALT (SGPT) 29 07/16/2019 12:35 PM     Lab Results   Component Value Date/Time    Hemoglobin A1c 6.5 (H) 07/16/2019 12:35 PM    Hemoglobin A1c (POC) 5.5 10/10/2019 11:21 PM     Wt Readings from Last 3 Encounters:   01/09/20 200 lb (90.7 kg)   10/10/19 198 lb (89.8 kg)   08/06/19 198 lb (89.8 kg)     Last Point of Care HGB A1C  Hemoglobin A1c (POC)   Date Value Ref Range Status   10/10/2019 5.5 % Final      BP Readings from Last 3 Encounters:   01/09/20 150/80   10/10/19 137/83   08/06/19 149/79       Hypertension ROS: taking medications as instructed, no medication side effects noted, no TIA's, no chest pain on exertion, no dyspnea on exertion, no swelling of ankles. Review of Systems - ENT ROS: positive for - nasal congestion and sore throat    New concerns: states she began to have cold symptoms for the last couple of days. States she initially had sore throat. 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 Objective:   Visit Vitals  /84 (BP 1 Location: Right arm, BP Patient Position: Sitting)   Pulse 85   Temp 98.3 °F (36.8 °C) (Oral)   Resp 18   Ht 5' 6\" (1.676 m)   Wt 200 lb (90.7 kg)   SpO2 98%   BMI 32.28 kg/m²      General appearance - alert, well appearing, and in no distress  Neck - supple, no significant adenopathy, carotids upstroke normal bilaterally, no bruits  Chest - clear to auscultation, no wheezes, rales or rhonchi, symmetric air entry  Heart - normal rate, regular rhythm, normal S1, S2, no murmurs, rubs, clicks or gallops  Extremities - peripheral pulses normal, no pedal edema, no clubbing or cyanosis  Ears - mid ear effusion bilaterally  Nose - mucosal congestion, clear rhinorrhea and sinuses normal and nontender  Mouth - mucous membranes moist, pharynx normal without lesions        Assessment/Plan:      ICD-10-CM ICD-9-CM    1. Essential hypertension I10 401.9 LIPID PANEL      METABOLIC PANEL, COMPREHENSIVE   2. Bilateral lower extremity edema R60.0 782.3 LIPID PANEL      METABOLIC PANEL, COMPREHENSIVE   3. Elevated hemoglobin A1c R73.09 790.29 HEMOGLOBIN A1C WITH EAG   4.  Acute nasopharyngitis J00 460      Orders Placed This Encounter    LIPID PANEL    HEMOGLOBIN A1C WITH EAG    METABOLIC PANEL, COMPREHENSIVE    triamterene-hydroCHLOROthiazide (MAXZIDE) 37.5-25 mg per tablet         Symptomatic relief discussed   current treatment plan is effective, no change in therapy. I have discussed the diagnosis with the patient and the intended plan as seen in the above orders. The patient has received an after-visit summary and questions were answered concerning future plans. I have discussed medication side effects and warnings with the patient as well. Patient agreeable with above plan and verbalizes understanding. Follow-up and Dispositions    · Return in about 4 months (around 5/9/2020) for HTN/edema.

## 2020-01-09 NOTE — PROGRESS NOTES
Chief Complaint   Patient presents with    Hypertension    Peripheral Edema     1. Have you been to the ER, urgent care clinic since your last visit? Hospitalized since your last visit? no    2. Have you seen or consulted any other health care providers outside of the [address] [place] [address] since your last visit? Include any pap smears or colon screening.  no

## 2020-01-11 ENCOUNTER — TELEPHONE (OUTPATIENT)
Dept: FAMILY MEDICINE CLINIC | Age: 65
End: 2020-01-11

## 2020-01-11 NOTE — PROGRESS NOTES
Got called from pt that has two episode of chest pain lasted for few minutes / short time. Amberly Snipe was severe. Not reproducible. No radiation of pain. No nausea or vomiting. No sob, no abdominal pain. She was driving whole both episode occurred. No diaphoresis. No prior similar episode occur in the past.   Has h/o hypertension and Pre diabetes. I have advised her to go to Er to get evaluated. She understood and said will do that.

## 2020-01-13 NOTE — TELEPHONE ENCOUNTER
Got called from pt that has two episode of chest pain lasted for few minutes / short time. Arturo Nuno was severe. Not reproducible. No radiation of pain. No nausea or vomiting. No sob, no abdominal pain. She was driving whole both episode occurred. No diaphoresis. No prior similar episode occur in the past.   Has h/o hypertension and Pre diabetes. I have advised her to go to Er to get evaluated. She understood and said will do that.

## 2020-01-15 ENCOUNTER — OFFICE VISIT (OUTPATIENT)
Dept: FAMILY MEDICINE CLINIC | Age: 65
End: 2020-01-15

## 2020-01-15 VITALS
TEMPERATURE: 98 F | DIASTOLIC BLOOD PRESSURE: 78 MMHG | HEIGHT: 66 IN | OXYGEN SATURATION: 98 % | BODY MASS INDEX: 31.82 KG/M2 | RESPIRATION RATE: 18 BRPM | HEART RATE: 80 BPM | WEIGHT: 198 LBS | SYSTOLIC BLOOD PRESSURE: 133 MMHG

## 2020-01-15 DIAGNOSIS — R07.89 ATYPICAL CHEST PAIN: Primary | ICD-10-CM

## 2020-01-15 DIAGNOSIS — N30.01 ACUTE CYSTITIS WITH HEMATURIA: ICD-10-CM

## 2020-01-15 RX ORDER — NITROFURANTOIN 25; 75 MG/1; MG/1
100 CAPSULE ORAL 2 TIMES DAILY
Qty: 10 CAP | Refills: 0 | Status: SHIPPED | OUTPATIENT
Start: 2020-01-15 | End: 2020-01-20

## 2020-01-15 NOTE — PATIENT INSTRUCTIONS
Chest Pain: Care Instructions  Your Care Instructions    There are many things that can cause chest pain. Some are not serious and will get better on their own in a few days. But some kinds of chest pain need more testing and treatment. Your doctor may have recommended a follow-up visit in the next 8 to 12 hours. If you are not getting better, you may need more tests or treatment. Even though your doctor has released you, you still need to watch for any problems. The doctor carefully checked you, but sometimes problems can develop later. If you have new symptoms or if your symptoms do not get better, get medical care right away. If you have worse or different chest pain or pressure that lasts more than 5 minutes or you passed out (lost consciousness), call 911 or seek other emergency help right away. A medical visit is only one step in your treatment. Even if you feel better, you still need to do what your doctor recommends, such as going to all suggested follow-up appointments and taking medicines exactly as directed. This will help you recover and help prevent future problems. How can you care for yourself at home? · Rest until you feel better. · Take your medicine exactly as prescribed. Call your doctor if you think you are having a problem with your medicine. · Do not drive after taking a prescription pain medicine. When should you call for help? Call 911 if:    · You passed out (lost consciousness).     · You have severe difficulty breathing.     · You have symptoms of a heart attack. These may include:  ? Chest pain or pressure, or a strange feeling in your chest.  ? Sweating. ? Shortness of breath. ? Nausea or vomiting. ? Pain, pressure, or a strange feeling in your back, neck, jaw, or upper belly or in one or both shoulders or arms. ? Lightheadedness or sudden weakness. ? A fast or irregular heartbeat.   After you call 911, the  may tell you to chew 1 adult-strength or 2 to 4 low-dose aspirin. Wait for an ambulance. Do not try to drive yourself.    Call your doctor today if:    · You have any trouble breathing.     · Your chest pain gets worse.     · You are dizzy or lightheaded, or you feel like you may faint.     · You are not getting better as expected.     · You are having new or different chest pain. Where can you learn more? Go to http://georgi-xuan.info/. Enter A120 in the search box to learn more about \"Chest Pain: Care Instructions. \"  Current as of: June 26, 2019  Content Version: 12.2  © 5031-0774 [employer]. Care instructions adapted under license by [employer] (which disclaims liability or warranty for this information). If you have questions about a medical condition or this instruction, always ask your healthcare professional. [address] any warranty or liability for your use of this information. Urinary Tract Infection in Women: Care Instructions  Your Care Instructions    A urinary tract infection, or UTI, is a general term for an infection anywhere between the kidneys and the urethra (where urine comes out). Most UTIs are bladder infections. They often cause pain or burning when you urinate. UTIs are caused by bacteria and can be cured with antibiotics. Be sure to complete your treatment so that the infection goes away. Follow-up care is a key part of your treatment and safety. Be sure to make and go to all appointments, and call your doctor if you are having problems. It's also a good idea to know your test results and keep a list of the medicines you take. How can you care for yourself at home? · Take your antibiotics as directed. Do not stop taking them just because you feel better. You need to take the full course of antibiotics. · Drink extra water and other fluids for the next day or two. This may help wash out the bacteria that are causing the infection.  (If you have kidney, heart, or liver disease and have to limit fluids, talk with your doctor before you increase your fluid intake.)  · Avoid drinks that are carbonated or have caffeine. They can irritate the bladder. · Urinate often. Try to empty your bladder each time. · To relieve pain, take a hot bath or lay a heating pad set on low over your lower belly or genital area. Never go to sleep with a heating pad in place. To prevent UTIs  · Drink plenty of water each day. This helps you urinate often, which clears bacteria from your system. (If you have kidney, heart, or liver disease and have to limit fluids, talk with your doctor before you increase your fluid intake.)  · Urinate when you need to. · Urinate right after you have sex. · Change sanitary pads often. · Avoid douches, bubble baths, feminine hygiene sprays, and other feminine hygiene products that have deodorants. · After going to the bathroom, wipe from front to back. When should you call for help? Call your doctor now or seek immediate medical care if:    · Symptoms such as fever, chills, nausea, or vomiting get worse or appear for the first time.     · You have new pain in your back just below your rib cage. This is called flank pain.     · There is new blood or pus in your urine.     · You have any problems with your antibiotic medicine.    Watch closely for changes in your health, and be sure to contact your doctor if:    · You are not getting better after taking an antibiotic for 2 days.     · Your symptoms go away but then come back. Where can you learn more? Go to http://georgi-xuan.info/. Enter Z031 in the search box to learn more about \"Urinary Tract Infection in Women: Care Instructions. \"  Current as of: December 19, 2018  Content Version: 12.2  © 2880-7080 [employer]. Care instructions adapted under license by [employer] (which disclaims liability or warranty for this information).  If you have questions about a medical condition or this instruction, always ask your healthcare professional. [place] [address] any warranty or liability for your use of this information.

## 2020-01-15 NOTE — PROGRESS NOTES
Chief Complaint   Patient presents with   [facility] ED Follow-up     1. Have you been to the ER, urgent care clinic since your last visit? Hospitalized since your last visit? Here today for follow up     2. Have you seen or consulted any other health care providers outside of the [address] [place] [address] since your last visit? Include any pap smears or colon screening.  no

## 2020-01-15 NOTE — PROGRESS NOTES
HISTORY OF PRESENT Ruth Huff is a 59y.o. year old female comes in today for ED follow up: Atypical chest pain    Patient was seen in the ED on 1/11/2020 for chest pain. Bhargav Perea is a 59 y.o. female with history of hypertension and bilateral lower extremity edema (currently taking triamterene-HCTZ) who had an episode of left-sided chest pain that started about an hour and a half ago. It was severe, sharp, \"squeezing\" but resolved slowly over the next 10 minutes. Then it returned at full strength and lasted about 5 more minutes before resolving abruptly and completely. She has not had chest pain since then. She did not have associated shortness of breath, diaphoresis, weakness, coughing, and this was not exertional pain. She has chronic lower extremity edema but denies new calf pain, swelling, history of DVT or PE. She has never had a stress test. She denies strong family history of cardiac disease. She has no personal history of CAD, heart failure, or cardiomyopathy. She is not a smoker. She denies recent cough. States Saturday while in the car at  she experienced severe left sided stabbing/tight pain, recurred at 1315 and then again at 1320. States she called the on call provider and was advised to report to the ED for further evaluation. SHAWN Amezcua - 01/11/2020    We encourage you to follow up with your cardiologist or PCP for additional testing that may include a stress echocardiogram.   Return to Emergency Department IMMEDIATELY if chest pain returns or is accompanied by shortness of breath, sweating, or weakness    Patient reports chest pain has resolved since discharge. Denies family history of heart disease. No other concerns expressed at this time.         URINALYSIS POC (LAB) (01/11/2020 3:01 PM EST)  URINALYSIS POC (LAB) (01/11/2020 3:01 PM EST)   Component Value Ref Range Performed At Pathologist Signature   Urine pH 5.5 5.0 - 8.0 pH [facility] LABORATORY   Urine Protein Screen Negative Negative mg/dL [facility]     Urine Glucose Negative Negative mg/dL [facility]     Urine Ketones Negative Negative mg/dL [facility]     Urine Occult Blood Small (A) Negative [facility]     Urine Specific Gravity 1.025 1.003 - 1.030 [facility]     Urine Nitrite Negative Negative [facility]     Urine Leukocyte Esterase Small (A) Negative [facility]     Urine Bilirubin Negative Negative [facility]     Urine Urobilinogen 0.2 0.2 - 1.0 mg/dL [facility]       CBC WITH DIFFERENTIAL AUTO (01/11/2020 2:54 PM EST)  CBC WITH DIFFERENTIAL AUTO (01/11/2020 2:54 PM EST)   Component Value Ref Range Performed At Pathologist Signature   WBC x 10*3 10.7 4.0 - 11.0 K/uL [facility]     RBC x 10^6 4.77 3.80 - 5.20 M/uL [facility]     HGB 12.1 11.7 - 16.0 g/dL [facility]     HCT 39.4 35.1 - 48.0 % [facility]     MCV 83 81 - 99 fL [facility]     MCH 25 (L) 26 - 34 pg [facility]     MCHC 31 31 - 36 g/dL [facility]     RDW 13.9 10.0 - 15.5 % [facility]     Platelet 458 087 - 156 K/uL [facility]     MPV 10.0 9.0 - 13.0 fL [facility]     Segmented Neutrophils 62 40 - 75 % [facility]     Lymphocytes 32 20 - 45 % [facility]     Monocytes 4 3 - 12 % [facility]     Eosinophil 2 0 - 6 % [facility]     Basophils 0 0 - 2 % [facility]     Absolute Neutrophils 6.7 1.8 - 7.7 K/uL [facility]     Absolute Lymphocytes 3.4 1.0 - 4.8 K/uL [facility]     Absolute Monocyte Count 0.4 0.1 - 1.0 K/uL [facility]ORAT[facility]     Absolute Eosinophil 0.2 0.0 - 0.5 K/uL [facility]AT[facility]     Absolute Basophil Count 0.0 0.0 - 0.2 K/uL [facility] LABORATORY      BASIC METABOLIC PANEL (78/60/7957 2:54 PM EST)  BASIC METABOLIC PANEL (38/68/0813 2:54 PM EST)   Component Value Ref Range Performed At Pathologist Signature   Potassium 4.2 3.5 - 5.5 mmol/L [facility] LABORATORY     Sodium 140 133 - 145 mmol/L [facility]AT[facility]     Chloride 100 98 - 110 mmol/L [facility]     Glucose 99 70 - 99 mg/dL [facility]AT[facility]     Calcium 10.3 8.4 - 10.5 mg/dL [facility]AT[facility]     BUN 23 (H) 6 - 22 mg/dL [facility] LAB[facility]AT[facility]     Creatinine 1.0 0.8 - 1.4 mg/dL [facility]     CO2 23 20 - 32 mmol/L [facility]AT[facility]     eGFR  >60.0 >60.0 [facility] LABORATORY     eGFR Non  53.3 (L) >60.0 [facility]     Anion Gap 16.7 mmol/L [facility] LABORATORY      TROPONIN (01/11/2020 2:54 PM EST)  TROPONIN (01/11/2020 2:54 PM EST)   Component Value Ref Range Performed At [facility]   Troponin (T) Quantitative <0.01 0.00 - 0.01 ng/mL [facility] LABORATORY       TROPONIN (T) QUANTITATIVE (Lab Performed) (01/11/2020 6:09 PM EST)  TROPONIN (T) QUANTITATIVE (Lab Performed) (01/11/2020 6:09 PM EST)   Component Value Ref Range Performed At [facility]   Troponin (T) Quantitative <0.01 0.00 - 0.01 ng/mL [facility] LABORATORY      EKG 12 LEAD (Unit Performed) (01/11/2020 6:04 PM EST)  EKG 12 LEAD (Unit Performed) (01/11/2020 6:04 PM EST)   Component Value Ref Range Performed At [facility]   Heart Rate 66 bpm TRACEMASTERVUE     RR Interval 904 ms TRACEMASTERVUE     Atrial Rate 65 ms TRACEMASTERVUE     P-R Interval 171 ms TRACEMASTERVUE   P Duration 120 ms TRACEMASTERVUE     P Horizontal [place] 6 deg TRACEMASTERVUE     P Front [place] 23 deg TRACEMASTERVUE     Q Onset 500 ms TRACEMASTERVUE     QRSD Interval 108 ms TRACEMASTERVUE     QT Interval 454 ms TRACEMASTERVUE     QTcB 477 ms TRACEMASTERVUE     QTcF 469 ms TRACEMASTERVUE     QRS Horizontal Axis 1 deg TRACEMASTERVUE     QRS [place] -13 deg TRACEMASTERVUE     I-40 Front [place] 105 deg TRACEMASTERVUE     t-40 Horizontal Axis -55 deg TRACEMASTERVUE     T-40 Front [place] -32 deg TRACEMASTERVUE     T Horizontal Axis 28 deg TRACEMASTERVUE     T Wave [place] 49 deg TRACEMASTERVUE     S-T Horizontal Axis 68 deg TRACEMASTERVUE     S-T Front Axis -67 deg TRACEMASTERVUE     Impression - BORDERLINE ECG -   TRACEMASTERVUE     Impression SR-Sinus rhythm-normal P axis, V-rate 50-99   TRACEMASTERVUE     Impression LVH-Left ventricular hypertrophy-multiple voltage criteria   TRACEMASTERVUE           No Known Allergies  Current Outpatient Medications   Medication Sig Dispense Refill    triamterene-hydroCHLOROthiazide (MAXZIDE) 37.5-25 mg per tablet Take 1 Tab by mouth daily. 90 Tab 1    diclofenac (VOLTAREN) 1 % gel Apply  to affected area four (4) times daily. 100 g 0    ibuprofen (MOTRIN) 800 mg tablet Take 1 Tab by mouth every eight (8) hours as needed for Pain (with food). 39 Tab 0     Past Medical History:   Diagnosis Date    Essential hypertension, benign        Hospital Outpatient Visit on 01/09/2020   Component Date Value Ref Range Status    LIPID PROFILE 01/09/2020        Final    Cholesterol, total 01/09/2020 150  <200 MG/DL Final    Triglyceride 01/09/2020 86  <150 MG/DL Final    Comment: The drugs N-acetylcysteine (NAC) and  Metamiszole have been found to cause falsely  low results in this chemical assay. Please  be sure to submit blood samples obtained  BEFORE administration of either of these  drugs to assure correct results.       HDL Cholesterol 01/09/2020 47  40 - 60 MG/DL Final    LDL, calculated 01/09/2020 85.8  0 - 100 MG/DL Final    VLDL, calculated 01/09/2020 17.2  MG/DL Final    CHOL/HDL Ratio 01/09/2020 3.2  0 - 5.0   Final    Hemoglobin A1c 01/09/2020 6.3* 4.2 - 5.6 % Final    Comment: (NOTE)  HbA1C Interpretive Ranges  <5.7              Normal  5.7 - 6.4         Consider Prediabetes  >6.5              Consider Diabetes      Est. average glucose 01/09/2020 134  mg/dL Final    Comment: (NOTE)  The eAG should be interpreted with patient characteristics in mind   since ethnicity, interindividual differences, red cell lifespan,   variation in rates of glycation, etc. may affect the validity of the   calculation.  Sodium 01/09/2020 138  136 - 145 mmol/L Final    Potassium 01/09/2020 4.1  3.5 - 5.5 mmol/L Final    Chloride 01/09/2020 105  100 - 111 mmol/L Final    CO2 01/09/2020 30  21 - 32 mmol/L Final    Anion gap 01/09/2020 3  3.0 - 18 mmol/L Final    Glucose 01/09/2020 85  74 - 99 mg/dL Final    BUN 01/09/2020 17  7.0 - 18 MG/DL Final    Creatinine 01/09/2020 1.05  0.6 - 1.3 MG/DL Final    BUN/Creatinine ratio 01/09/2020 16  12 - 20   Final    GFR est AA 01/09/2020 >60  >60 ml/min/1.73m2 Final    GFR est non-AA 01/09/2020 53* >60 ml/min/1.73m2 Final    Comment: (NOTE)  Estimated GFR is calculated using the Modification of Diet in Renal   Disease (MDRD) Study equation, reported for both  Americans   (GFRAA) and non- Americans (GFRNA), and normalized to 1.73m2   body surface area. The physician must decide which value applies to   the patient. The MDRD study equation should only be used in   individuals age 25 or older. It has not been validated for the   following: pregnant women, patients with serious comorbid conditions,   or on certain medications, or persons with extremes of body size,   muscle mass, or nutritional status.       Calcium 01/09/2020 9.4  8.5 - 10.1 MG/DL Final    Bilirubin, total 01/09/2020 0.3  0.2 - 1.0 MG/DL Final    ALT (SGPT) 01/09/2020 30  13 - 56 U/L Final    AST (SGOT) 01/09/2020 26  10 - 38 U/L Final    Alk. phosphatase 01/09/2020 104  45 - 117 U/L Final    Protein, total 01/09/2020 8.1  6.4 - 8.2 g/dL Final    Albumin 01/09/2020 3.4  3.4 - 5.0 g/dL Final    Globulin 01/09/2020 4.7* 2.0 - 4.0 g/dL Final    A-G Ratio 01/09/2020 0.7* 0.8 - 1.7   Final         ROS:  Review of Systems   Constitutional: Negative for chills and fever. Respiratory: Negative for shortness of breath. Cardiovascular: Negative for chest pain and palpitations. Neurological: Negative for dizziness and headaches. /78 (BP 1 Location: Right arm, BP Patient Position: Sitting)   Pulse 80   Temp 98 °F (36.7 °C) (Oral)   Resp 18   Ht 5' 6\" (1.676 m)   Wt 198 lb (89.8 kg)   SpO2 98%   BMI 31.96 kg/m²     Objective:  Physical Exam  HENT:      Head: Normocephalic and atraumatic. Neck:      Musculoskeletal: Normal range of motion and neck supple. Cardiovascular:      Rate and Rhythm: Normal rate and regular rhythm. Heart sounds: Normal heart sounds. No murmur. No friction rub. No gallop. Pulmonary:      Effort: Pulmonary effort is normal.      Breath sounds: Normal breath sounds. No wheezing, rhonchi or rales. Lymphadenopathy:      Cervical: No cervical adenopathy. Skin:     General: Skin is warm and dry. Neurological:      Mental Status: She is alert and oriented to person, place, and time. Assessment/Plan:     ICD-10-CM ICD-9-CM    1. Atypical chest pain R07.89 786.59 EXERCISE CARDIAC STRESS TEST   2. Acute cystitis with hematuria N30.01 595.0      Orders Placed This Encounter    nitrofurantoin, macrocrystal-monohydrate, (MACROBID) 100 mg capsule       I have discussed the diagnosis with the patient and the intended plan as seen in the above orders. The patient has received an after-visit summary and questions were answered concerning future plans.   I have discussed medication side effects and warnings with the patient as well.  Patient agreeable with above plan and verbalizes understanding. Follow-up and Dispositions    · Return if symptoms worsen or fail to improve.

## 2020-01-23 ENCOUNTER — HOSPITAL ENCOUNTER (OUTPATIENT)
Dept: NON INVASIVE DIAGNOSTICS | Age: 65
Discharge: HOME | End: 2020-01-23
Attending: NURSE PRACTITIONER
Payer: COMMERCIAL

## 2020-01-23 VITALS
WEIGHT: 198 LBS | SYSTOLIC BLOOD PRESSURE: 136 MMHG | HEIGHT: 66 IN | BODY MASS INDEX: 31.82 KG/M2 | DIASTOLIC BLOOD PRESSURE: 78 MMHG

## 2020-01-23 DIAGNOSIS — R07.89 ATYPICAL CHEST PAIN: ICD-10-CM

## 2020-01-23 PROCEDURE — 93017 CV STRESS TEST TRACING ONLY: CPT

## 2020-01-24 LAB
STRESS ANGINA INDEX: 0
STRESS BASELINE DIAS BP: 78 MMHG
STRESS BASELINE HR: 75 BPM
STRESS BASELINE SYS BP: 136 MMHG
STRESS ESTIMATED WORKLOAD: 7.2 METS
STRESS EXERCISE DUR MIN: NORMAL
STRESS PEAK DIAS BP: 70 MMHG
STRESS PEAK SYS BP: 160 MMHG
STRESS PERCENT HR ACHIEVED: 94 %
STRESS POST PEAK HR: 146 BPM
STRESS RATE PRESSURE PRODUCT: NORMAL BPM*MMHG
STRESS STAGE 1 BP: NORMAL MMHG
STRESS STAGE 1 DURATION: 3 MIN:SEC
STRESS STAGE 1 HR: 122 BPM
STRESS STAGE 2 BP: NORMAL MMHG
STRESS STAGE 2 DURATION: 3 MIN:SEC
STRESS STAGE 2 HR: 146 BPM
STRESS STAGE 3 DURATION: NORMAL MIN:SEC
STRESS STAGE 3 HR: 146 BPM
STRESS STAGE RECOVERY 1 BP: NORMAL MMHG
STRESS STAGE RECOVERY 1 DURATION: 2 MIN:SEC
STRESS STAGE RECOVERY 1 HR: 103 BPM
STRESS STAGE RECOVERY 2 BP: NORMAL MMHG
STRESS STAGE RECOVERY 2 DURATION: 4 MIN:SEC
STRESS STAGE RECOVERY 2 HR: 93 BPM
STRESS TARGET HR: 156 BPM

## 2020-04-03 RX ORDER — TRIAMTERENE/HYDROCHLOROTHIAZID 37.5-25 MG
1 TABLET ORAL DAILY
Qty: 90 TAB | Refills: 1 | Status: CANCELLED | OUTPATIENT
Start: 2020-04-03

## 2020-04-03 NOTE — TELEPHONE ENCOUNTER
Patient still has refills at the pharmacy for Maxzide. I contacted the pharmacy to confirm and requested they refill it for the patient. No further action needed.

## 2020-06-15 ENCOUNTER — OFFICE VISIT (OUTPATIENT)
Dept: FAMILY MEDICINE CLINIC | Age: 65
End: 2020-06-15

## 2020-06-15 VITALS
BODY MASS INDEX: 32.62 KG/M2 | HEART RATE: 73 BPM | DIASTOLIC BLOOD PRESSURE: 82 MMHG | HEIGHT: 66 IN | TEMPERATURE: 97.5 F | RESPIRATION RATE: 18 BRPM | OXYGEN SATURATION: 100 % | WEIGHT: 203 LBS | SYSTOLIC BLOOD PRESSURE: 150 MMHG

## 2020-06-15 DIAGNOSIS — M76.60 ACHILLES TENDON PAIN: ICD-10-CM

## 2020-06-15 DIAGNOSIS — M79.89 LEG SWELLING: Primary | ICD-10-CM

## 2020-06-15 RX ORDER — PREDNISONE 10 MG/1
TABLET ORAL
Qty: 21 TAB | Refills: 0 | Status: SHIPPED | OUTPATIENT
Start: 2020-06-15 | End: 2021-01-04 | Stop reason: ALTCHOICE

## 2020-06-15 NOTE — PROGRESS NOTES
HISTORY OF PRESENT ILLNESS  Dean Cardona is a 59 y.o. female. Patient states she has been having right achilles pain. States she has been having swelling and skin discoloration of bilateral lower extremities. Comments she has been wearing support stockings without improvement. She did recently began working a second job. No Known Allergies  Current Outpatient Medications   Medication Sig Dispense Refill    triamterene-hydroCHLOROthiazide (MAXZIDE) 37.5-25 mg per tablet Take 1 Tab by mouth daily. 90 Tab 1    diclofenac (VOLTAREN) 1 % gel Apply  to affected area four (4) times daily. 100 g 0    ibuprofen (MOTRIN) 800 mg tablet Take 1 Tab by mouth every eight (8) hours as needed for Pain (with food).  39 Tab 0     Past Medical History:   Diagnosis Date    Essential hypertension, benign      Social History     Socioeconomic History    Marital status:      Spouse name: Not on file    Number of children: Not on file    Years of education: Not on file    Highest education level: Not on file   Occupational History    Not on file   Social Needs    Financial resource strain: Not on file    Food insecurity     Worry: Not on file     Inability: Not on file    Transportation needs     Medical: Not on file     Non-medical: Not on file   Tobacco Use    Smoking status: Never Smoker    Smokeless tobacco: Never Used   Substance and Sexual Activity    Alcohol use: No    Drug use: Never    Sexual activity: Not on file   Lifestyle    Physical activity     Days per week: Not on file     Minutes per session: Not on file    Stress: Not on file   Relationships    Social connections     Talks on phone: Not on file     Gets together: Not on file     Attends [employer] service: Not on file     Active member of club or organization: Not on file     Attends meetings of clubs or organizations: Not on file     Relationship status: Not on file    Intimate partner violence     Fear of current or ex partner: Not on file     Emotionally abused: Not on file     Physically abused: Not on file     Forced sexual activity: Not on file   Other Topics Concern    Not on file   Social History Narrative    Not on file     Wt Readings from Last 3 Encounters:   06/15/20 203 lb (92.1 kg)   20 198 lb (89.8 kg)   01/15/20 198 lb (89.8 kg)     BP Readings from Last 3 Encounters:   20 136/78   01/15/20 133/78   20 144/84     Review of Systems   Constitutional: Negative for chills and fever. Respiratory: Negative for cough and shortness of breath. Cardiovascular: Positive for leg swelling. Negative for chest pain, palpitations and orthopnea. Skin:        Bilateral lower leg discoloration   Neurological: Negative for dizziness and headaches. /82 (BP 1 Location: Left arm, BP Patient Position: Sitting)   Pulse 73   Temp 97.5 °F (36.4 °C) (Temporal)   Resp 18   Ht 5' 6\" (1.676 m)   Wt 203 lb (92.1 kg)   SpO2 100%   BMI 32.77 kg/m²     Physical Exam  Constitutional:       Appearance: She is well-developed. HENT:      Head: Normocephalic and atraumatic. Neck:      Musculoskeletal: Normal range of motion and neck supple. Cardiovascular:      Rate and Rhythm: Normal rate and regular rhythm. Heart sounds: Normal heart sounds. No murmur. No friction rub. No gallop. Comments: Vascular dermatitis bilateral lower extremities. Pulmonary:      Effort: Pulmonary effort is normal.      Breath sounds: Normal breath sounds. No wheezing, rhonchi or rales. Abdominal:      General: Bowel sounds are normal.      Palpations: Abdomen is soft. Tenderness: There is no abdominal tenderness. Musculoskeletal:      Right lower le+ Pitting Edema present. Left lower le+ Pitting Edema present. Skin:     General: Skin is warm and dry. Neurological:      Mental Status: She is alert and oriented to person, place, and time.          ASSESSMENT and PLAN    ICD-10-CM ICD-9-CM    1. Leg swelling I76.23 627.14 METABOLIC PANEL, BASIC      DUPLEX LOWER EXT VENOUS BILAT      METABOLIC PANEL, BASIC   2. Achilles tendon pain M76.60 727.89      Orders Placed This Encounter    METABOLIC PANEL, BASIC    predniSONE (STERAPRED DS) 10 mg dose pack     Anticipatory guidance for above provided and reviewed  Work note provided  alarm signs when to seek emergent care provided and reviewed   I have discussed the diagnosis with the patient and the intended plan as seen in the above orders. The patient has received an after-visit summary and questions were answered concerning future plans. I have discussed medication side effects and warnings with the patient as well. Patient agreeable with above plan and verbalizes understanding. Follow-up and Dispositions    · Return if symptoms worsen or fail to improve, for keep scheduled follow up.

## 2020-06-15 NOTE — PATIENT INSTRUCTIONS
Leg and Ankle Edema: Care Instructions  Your Care Instructions  Swelling in the legs, ankles, and feet is called edema. It is common after you sit or stand for a while. Long plane flights or car rides often cause swelling in the legs and feet. You may also have swelling if you have to stand for long periods of time at your job. Problems with the veins in the legs (varicose veins) and changes in hormones can also cause swelling. Sometimes the swelling in the ankles and feet is caused by a more serious problem, such as heart failure, infection, blood clots, or liver or kidney disease. Follow-up care is a key part of your treatment and safety. Be sure to make and go to all appointments, and call your doctor if you are having problems. It's also a good idea to know your test results and keep a list of the medicines you take. How can you care for yourself at home? · If your doctor gave you medicine, take it as prescribed. Call your doctor if you think you are having a problem with your medicine. · Whenever you are resting, raise your legs up. Try to keep the swollen area higher than the level of your heart. · Take breaks from standing or sitting in one position. ? Walk around to increase the blood flow in your lower legs. ? Move your feet and ankles often while you stand, or tighten and relax your leg muscles. · Wear support stockings. Put them on in the morning, before swelling gets worse. · Eat a balanced diet. Lose weight if you need to. · Limit the amount of salt (sodium) in your diet. Salt holds fluid in the body and may increase swelling. When should you call for help? [id number] anytime you think you may need emergency care. For example, call if:  · You have symptoms of a blood clot in your lung (called a pulmonary embolism). These may include:  ? Sudden chest pain. ? Trouble breathing. ? Coughing up blood.   Call your doctor now or seek immediate medical care if:  · You have signs of a blood clot, such as:  ? Pain in your calf, back of the knee, thigh, or groin. ? Redness and swelling in your leg or groin. · You have symptoms of infection, such as:  ? Increased pain, swelling, warmth, or redness. ? Red streaks or pus. ? A fever. Watch closely for changes in your health, and be sure to contact your doctor if:  · Your swelling is getting worse. · You have new or worsening pain in your legs. · You do not get better as expected. Where can you learn more? Go to http://georgi-xuan.info/  Enter E783 in the search box to learn more about \"Leg and Ankle Edema: Care Instructions. \"  Current as of: June 26, 2019               Content Version: 12.5  © 6546-2553 [employer]. Care instructions adapted under license by [employer] (which disclaims liability or warranty for this information). If you have questions about a medical condition or this instruction, always ask your healthcare professional. [place] [address] any warranty or liability for your use of this information.

## 2020-06-15 NOTE — LETTER
NOTIFICATION RETURN TO WORK  
 
6/15/2020 11:12 AM 
 
[facility] [id number] To Whom It May Concern: 
 
[facility] Emery [facility] the [facility] She will return to work on: 6/17/2020 or sooner if improved If there are questions or concerns please have the patient contact our office.  
 
 
 
Sincerely, 
 
 
Gretta Bolton NP

## 2020-06-16 LAB
ANION GAP SERPL CALC-SCNC: 12 MMOL/L
BUN SERPL-MCNC: 19 MG/DL (ref 6–22)
CALCIUM SERPL-MCNC: 10.3 MG/DL (ref 8.4–10.5)
CHLORIDE SERPL-SCNC: 100 MMOL/L (ref 98–110)
CO2 SERPL-SCNC: 27 MMOL/L (ref 20–32)
CREAT SERPL-MCNC: 0.8 MG/DL (ref 0.8–1.4)
GFRAA, 66117: >60
GFRNA, 66118: >60
GLUCOSE SERPL-MCNC: 96 MG/DL (ref 70–99)
POTASSIUM SERPL-SCNC: 4.2 MMOL/L (ref 3.5–5.5)
SODIUM SERPL-SCNC: 139 MMOL/L (ref 133–145)

## 2020-06-19 ENCOUNTER — HOSPITAL ENCOUNTER (OUTPATIENT)
Dept: VASCULAR SURGERY | Age: 65
Discharge: HOME | End: 2020-06-19
Attending: NURSE PRACTITIONER
Payer: COMMERCIAL

## 2020-06-19 DIAGNOSIS — M79.89 LEG SWELLING: ICD-10-CM

## 2020-06-19 PROCEDURE — 93970 EXTREMITY STUDY: CPT

## 2020-06-22 ENCOUNTER — TELEPHONE (OUTPATIENT)
Dept: FAMILY MEDICINE CLINIC | Age: 65
End: 2020-06-22

## 2020-07-08 ENCOUNTER — TELEPHONE (OUTPATIENT)
Dept: FAMILY MEDICINE CLINIC | Age: 65
End: 2020-07-08

## 2020-07-08 DIAGNOSIS — M79.89 LEG SWELLING: Primary | ICD-10-CM

## 2020-07-08 RX ORDER — TRIAMTERENE/HYDROCHLOROTHIAZID 37.5-25 MG
1 TABLET ORAL DAILY
Qty: 90 TAB | Refills: 1 | Status: SHIPPED | OUTPATIENT
Start: 2020-07-08 | End: 2021-03-29

## 2020-07-08 NOTE — TELEPHONE ENCOUNTER
Patient came into the office today requesting to be seen for leg swelling. States she went to the emergency room on last night. Discharge around 4am for leg swelling. States she has not had any sodium intake. Does not wear compression stocking. Has taking medications as directed. Advised will discuss with provider. She verbalized understanding. Returned to the room after speaking with the provider. Patient is to take 2 tablets of Maxzide daily x 7 days then resume taking only once daily. Patient has been referred to vascular for leg swelling. Patient verbalized understanding. Patient comments she does have an appointment upcoming with a specialist. Denilson Miguel to recall name at this time. Patient left ambulatory with no complaints of pain or distress noted at this time.

## 2020-08-07 ENCOUNTER — HOSPITAL ENCOUNTER (OUTPATIENT)
Dept: PHYSICAL THERAPY | Age: 65
Discharge: HOME | End: 2020-08-07
Payer: COMMERCIAL

## 2020-08-07 PROCEDURE — 97535 SELF CARE MNGMENT TRAINING: CPT

## 2020-08-07 PROCEDURE — 97162 PT EVAL MOD COMPLEX 30 MIN: CPT

## 2020-08-07 PROCEDURE — 97140 MANUAL THERAPY 1/> REGIONS: CPT

## 2020-08-07 NOTE — PROGRESS NOTES
[facility] at [address]        [address] [facility] Angelina Villanueva - Phone: [phone]  Fax: [phone] / [address] [address] PHYSICAL THERAPY SERVICES  Patient Name: Sadiq Romero : 1955   Medical   Diagnosis: Lymphedema, not elsewhere classified [I89.0] Treatment Diagnosis: Bilateral Grade II Lymphedema   Onset Date: Chronic 1 year     Referral Source: Jose David Yun NP Start of Care [facility]): 2020   Prior Hospitalization: See medical history Provider #: [id number]   Prior Level of Function: Independent with ADL's   Comorbidities: HTN   Medications: Verified on Patient Summary List   The Plan of Care and following information is based on the information from the initial evaluation.   ===========================================================================================  Assessment / key information:  Sadiq Romero is a 59 y.o.  female with Dx: Lymphedema, not elsewhere classified [I89.0], signs and symptoms consistent with grade II lymphedema. Pt presents to PT with reports of chronic LE edema that has progressed over the past year. The pt also reports tenderness in her posterior right calf along the achilles tendon due to what she believes is the insertion of a steep ramp at work. The pt has no likely instigating factor for the grade II lymphedema aside from chronic venous insufficiency diagnosed from the referring MD.  Objective: THe pt demonstrates the following functional deficits: 1) grade II lymphedema with girth detailed below, 2) decreased dorsi flexion AROM/PROM bilaterally (right -5/0 left 3/5), 3) antalgic gait with decreased WB on the right LE.      Girth (cm):  Right LE  2020 Left LE  2020   MTP: 23 24   Midfoot/navicular: 25 25   Ankle: 27.5 28.5   Calf:     (26 cm from malleolus) 39.5 40   Knee:    (2 cm from popliteus) 39 41.5 Thigh     (8 cm fron knee) 47.5 46.5   Groin: 70 69.5     The patient's FOTO score of 29 points indicates 71% limitation in functional ability. The patient would benefit from skilled PT to address the below listed impairments. Thank you for your referral.  ===========================================================================================  Eval Complexity: History: MEDIUM  Complexity : 1-2 comorbidities / personal factors will impact the outcome/ POC Exam:MEDIUM Complexity : 3 Standardized tests and measures addressing body structure, function, activity limitation and / or participation in recreation  Presentation: MEDIUM Complexity : Evolving with changing characteristics  Clinical Decision Making:MEDIUM Complexity : FOTO score of 26-74Overall Complexity:MEDIUM    Problem List: pain affecting function, decrease ROM, decrease strength, edema affecting function, impaired gait/ balance, decrease ADL/ functional abilitiies, decrease activity tolerance, decrease flexibility/ joint mobility and decrease transfer abilities   Treatment Plan may include any combination of the following: Therapeutic exercise, Therapeutic activities, Neuromuscular re-education, Physical agent/modality, Gait/balance training, Manual therapy, Patient education, Self Care training, Functional mobility training, Home safety training and Stair training    Treatment Protocol:    o Manual Lymphatic Drainage   o Soft Tissue Mobilization/MFR   o Compression Bandaging   o Decongestive Exercises/Self MFR/Strengthening   o Education   o Compression Garment    Patient / Family readiness to learn indicated by: asking questions, trying to perform skills and interest  Persons(s) to be included in education: patient (P)  Barriers to Learning/Limitations: None  Measures taken: na   Patient Goal (s): \"To walk without this limp. \"   Patient self reported health status: fair  Rehabilitation Potential: good  Short Term Goals:  To be accomplished in 2-3 weeks. 1) Pt will require verbal cues 0-25% of the time with performance of her HEP to improve symptoms at home. 2) Pt will be demonstrate at last 5 degrees of dorsi flexion AROM bilaterally to ambulate with symmetric WB. 3) 2) Patient will demonstrate decongestion of limb by 5% to improve efficiency with dressing. Long Term Goals: To be accomplished in 4-6 weeks. 1) Pt will be Independent with compression management routine to maintain LE girth measures. 2) Patient will demonstrate decongestion of limb by 8% to improve efficiency with ambulation. 3) Pt will increase strength of bilateral plantar flexors to at least 4+/5 to improve efficiency with push off in gait. 4)Patient will measure LE girth at the ankle and calf within 1-2 cm of the therapist to indicate efficiency with long-term lymphedema management. Frequency / Duration:   Patient to be seen  2-3  times per week for 4-6  weeks:  Patient / Caregiver education and instruction: self care, activity modification and exercises    Therapist Signature: Rj Elizabeth DPT Date: 0/9/9631   Certification Period: NA Time: 5:59 PM   ===========================================================================================  I certify that the above Physical Therapy Services are being furnished while the patient is under my care. I agree with the treatment plan and certify that this therapy is necessary. Physician Signature:        Date:       Time:     Please sign and return to [facility] or you may fax the signed copy to [phone]  Thank you.

## 2020-08-07 NOTE — PROGRESS NOTES
Lymphedema EVAL/DAILY NOTE    Patient Name: Neda Reason  Date:2020  : 1955  [x]  Patient  Verified  Payor: Albert Luong / Plan: Dylon Pablo HMO / Product Type: HMO /    In time: 4:25  Out time:5:25  Total Treatment Time (min): 60  Total Timed Codes (min): 40  1:1 Treatment Time (MC/BCBS only): 40   Visit #: 1     Referring Provider: Tamika Feldman NP  Next Appointment: Not mde  Treatment Area: Lymphedema, not elsewhere classified [I89.0]    Pain Level (0-10 scale): 6    Personal Goal:  \"To walk with less pain. \"    Home Situation (DME-pump, family suppor):compression socks 15-20    Dominant Hand: right handed    Current or Previous Compression Garment(s):   []Stocking []Sleeve  [] Pantyhose                          [] Glove/gauntlet/toe                           []Brand:                          []mmHg:   [] Size:     Compression Pump:  []Brand:                                     []Date Prescribed:    Has your activity changed since diagnosis? yes    Subjective functional status:     Present Symptoms/History: Pt presents to PT with reports of chronic LE edema that has progressed over the past year. The pt also reports tenderness in her posterior right calf along the achilles tendon due to what she believes is the insertion of a steep ramp at work.  The pt has no likely instigating factor for the grade II lymphedema aside from chronic venous insufficiency diagnosed from the referring MD.    Skin Changes:  Hyperpigmentation: yes bilateral feet and ankles  Papillomatosis: no  Hyperplasia: no  Lymphorrhea: no  Elephantiasis: no       OBJECTIVE:   Edema:  []min []moderate  [] severe [] pitting  Circumferences:                                                 Girth (cm):  Right LE  2020 Left LE  2020   MTP: 23 24   Midfoot/navicular: 25 25   Ankle: 27.5 28.5   Calf:     (26 cm from malleolus) 39.5 40   Knee:    (2 cm from popliteus) 39 41.5   Thigh     (8 cm fron knee) 47.5 46.5   Groin: 70 69.5       Skin Integrity      Sensation  []normal [] sensitive  [] decreased  Light touch/Sharp/Dull                Color []normal []red  [] pink               Scars/Burns/incisions:                Wounds: location:                      size:               depth:  Posture:    ROM:    Strength:    Breath Pattern Assessment:   Diaphragm___________________   Anterior Chest________________   Accessory Muscle Use______________      20 min []Eval                  []Re-Eval       15 min Manual Therapy:  Manual lymph drainage demonstration, girth measures   Rationale: decrease pain, increase ROM, increase tissue extensibility and decrease edema  to improve efficiency with ambulation    25 min Self Care/Home Management: skin care routine implementation, garment selection facilitation, garment care education, garment donning and doffing   Rationale: { to improve the patients skin health in order to prevent infection. With   [] TE   [] TA   [] neuro   [] other: Patient Education: [x] Review HEP    [] Progressed/Changed HEP based on:   [] positioning   [] body mechanics   [] transfers   [] heat/ice application    [] other:      Other Objective/Functional Measures: 0     Pain Level (0-10 scale) post treatment: 6    ASSESSMENT/Changes in Function: See POC    Justification for Eval Code Complexity: MODERATE  Patient History (low 0, mod 1-2, high 3-4):  Increased BMI, HTN MODERATE   Examination (low 1-2, mod 3+, high 4+): LE AROM, LE MMT, LE girth MODERATE  Clinical Presentation (low: stable/uncomplicated; mod: evolving; high: unstable/unpredictable): evolving girth with manual lymph drainage MODERATE  Clinical Decision Making (low , mod 26-74, high 1-25): 29 MODERATE           PLAN  []  Upgrade activities as tolerated     []  Continue plan of care  []  Update interventions per flow sheet       []  Discharge due to:_  []  Other:_      Timo Granda 8/7/2020  5:53 PM

## 2020-08-10 ENCOUNTER — HOSPITAL ENCOUNTER (OUTPATIENT)
Dept: PHYSICAL THERAPY | Age: 65
Discharge: HOME | End: 2020-08-10
Payer: COMMERCIAL

## 2020-08-10 PROCEDURE — 97140 MANUAL THERAPY 1/> REGIONS: CPT

## 2020-08-10 NOTE — PROGRESS NOTES
PHYSICAL THERAPY - DAILY TREATMENT NOTE    Patient Name: Neda Reason        Date: 8/10/2020  : 1955   YES Patient  Verified  Visit #:   2   of     Insurance: Payor: Albert Luong / Plan: Dylon Pablo HMO / Product Type: HMO /      In time: 2:00 Out time: 2:45   Total Treatment Time: 45     Medicare/[facility] Time Tracking (below)   Total Timed Codes (min):  NA 1:1 Treatment Time:  NA     TREATMENT AREA =  Lymphedema, not elsewhere classified [I89.0]    SUBJECTIVE    Pain Level (on 0 to 10 scale):   6 / 10   Medication Changes/New allergies or changes in medical history, any new surgeries or procedures? NO     If yes, update Summary List   Subjective Functional Status/Changes:  []  No changes reported     \"I tried the massage. It helps some. \"          OBJECTIVE    Modalities Rationale: To improve activity tolerance for exercise performance and ADL's.    min [] Estim, type/location:                                      []  att     []  unatt     []  w/US     []  w/ice    []  w/heat    min []  Mechanical Traction: type/lbs                   []  pro   []  sup   []  int   []  cont    []  before manual    []  after manual    min []  Ultrasound, settings/location:      min []  Iontophoresis w/ dexamethasone, location:                                               []  take home patch       []  in clinic   NA min []  Ice     []  Heat    location/position:     min []  Vasopneumatic Device, press/temp:     min []  Other:    [x] Skin assessment post-treatment (if applicable):   [x]  intact    []  redness- no adverse reaction     []redness - adverse reaction:    45 min Manual Therapy: manual lymph drainage and compression bandaging   Rationale:      decrease pain, increase ROM, increase tissue extensibility and decrease edema  to improve patient's ability to ambulate with decreased LE heaviness.      min Patient Education:  YES  Reviewed HEP   []  Progressed/Changed HEP based on:   Patient reports compliance     Other Objective/Functional Measures:    Pt started compression bandaging with no complications and an appropriate capillary response of 2 seconds     Post Treatment Pain Level (on 0 to 10) scale:   0  / 10     ASSESSMENT    Assessment/Changes in Function:     WIll continue compression bandaging until within 1-2 cm of contralateral LE at all landmarks or plateau in decreased girth measurements. WIll continue to progress strengthening/AROM per activity tolerance. []  See Progress Note/Recertification   Patient will continue to benefit from skilled PT services to modify and progress therapeutic interventions, address functional mobility deficits, address ROM deficits, address strength deficits, analyze and address soft tissue restrictions, analyze and cue movement patterns, analyze and modify body mechanics/ergonomics and assess and modify postural abnormalities to attain remaining goals. Progress toward goals / Updated goals: Addressed LE decongestion with compression bandaging.      PLAN    [x]  Upgrade activities as tolerated YES Continue plan of care   []  Discharge due to :    []  Other:      Therapist: Suzy Every    Date: 8/10/2020 Time: 2:06 PM     Future Appointments   Date Time Provider Sun Ortiz   8/12/2020  2:00 PM Freddy Dials [facility]   8/14/2020  2:00 PM Biola Bill [facility]   8/17/2020  2:00 PM Biola Bill [facility]   8/19/2020  2:00 PM Biola Bill [facility]   8/21/2020  4:15 PM Biola Bill [facility]   8/24/2020  2:00 PM Biola Bill [facility]   8/26/2020  2:00 PM Biola Bill [facility]   8/28/2020  2:00 PM Biola Bill [facility]   8/31/2020  2:00 PM Biola Bill [facility]   9/2/2020  2:00 PM Biola Bill [facility]   9/4/2020  2:00 PM Biola Bill [facility]

## 2020-08-12 ENCOUNTER — HOSPITAL ENCOUNTER (OUTPATIENT)
Dept: PHYSICAL THERAPY | Age: 65
Discharge: HOME | End: 2020-08-12
Payer: COMMERCIAL

## 2020-08-12 PROCEDURE — 97140 MANUAL THERAPY 1/> REGIONS: CPT

## 2020-08-12 PROCEDURE — 97110 THERAPEUTIC EXERCISES: CPT

## 2020-08-12 NOTE — PROGRESS NOTES
PHYSICAL THERAPY - DAILY TREATMENT NOTE    Patient Name: Brandyn Schneider        Date: 2020  : 1955   YES Patient  Verified  Visit #:   3     Insurance: Payor: Nellie Treadwell / Plan: [address] [place] [address] Vinod West HMO / Product Type: HMO /      In time: 2:00 Out time: 2:45   Total Treatment Time: 45     Medicare/BCBS Time Tracking (below)   Total Timed Codes (min):  NA 1:1 Treatment Time:  NA     TREATMENT AREA =  Lymphedema, not elsewhere classified [I89.0]    SUBJECTIVE    Pain Level (on 0 to 10 scale):  0  / 10   Medication Changes/New allergies or changes in medical history, any new surgeries or procedures? NO     If yes, update Summary List   Subjective Functional Status/Changes:  []  No changes reported     \"My heel on the right hurts worse. \"          OBJECTIVE      10 min Therapeutic Exercise:  [x]  See flow sheet   Rationale:      increase ROM and increase strength to improve the patients ability to improve weight-acceptance in gait. 35 min Manual Therapy: manual lymph drainage and compression bandaging   Rationale:      decrease pain, increase ROM, increase tissue extensibility and decrease edema  to improve patient's ability to ambulate with decreased LE heaviness. min Patient Education:  YES  Reviewed HEP   []  Progressed/Changed HEP based on:   Patient reports compliance     Other Objective/Functional Measures:    Pt decreased 3 cm at the ankle and 1-2 cm at other levels. Initiated repeated dorsi flexion both soleus and gastrocnemius bilaterally 10 times for each set. Post Treatment Pain Level (on 0 to 10) scale:   0  / 10     ASSESSMENT    Assessment/Changes in Function:     WIll continue compression bandaging until within 1-2 cm of contralateral LE at all landmarks or plateau in decreased girth measurements. WIll continue to progress strengthening/AROM per activity tolerance.      []  See Progress Note/Recertification   Patient will continue to benefit from skilled PT services to modify and progress therapeutic interventions, address functional mobility deficits, address ROM deficits, address strength deficits, analyze and address soft tissue restrictions, analyze and cue movement patterns, analyze and modify body mechanics/ergonomics and assess and modify postural abnormalities to attain remaining goals. Progress toward goals / Updated goals: Addressed LE decongestion with compression bandaging. Addressed dorsi flexion with repeated AROM exercises.      PLAN    [x]  Upgrade activities as tolerated YES Continue plan of care   []  Discharge due to :    []  Other:      Therapist: Emery Way    Date: 8/12/2020 Time: 4:16 PM     Future Appointments   Date Time Provider Sun Ortiz   8/14/2020  2:00 PM Amado Hung [facility]   8/17/2020  2:00 PM Liza Frank [facility]   8/19/2020  2:00 PM Liza Frank [facility]   8/21/2020  4:15 PM Amado Hung [facility]   8/24/2020  2:00 PM Liza Mati [facility]   8/26/2020  2:00 PM Lizacodi Thorne [facility]   8/28/2020  2:00 PM Lizacodi Thorne [facility]   8/31/2020  2:00 PM Lizacodi Thorne [facility]   9/2/2020  2:00 PM Liza Mati [facility]   9/4/2020  2:00 PM Liza Frank [facility]

## 2020-08-14 ENCOUNTER — HOSPITAL ENCOUNTER (OUTPATIENT)
Dept: PHYSICAL THERAPY | Age: 65
Discharge: HOME | End: 2020-08-14
Payer: COMMERCIAL

## 2020-08-14 PROCEDURE — 97140 MANUAL THERAPY 1/> REGIONS: CPT

## 2020-08-14 NOTE — PROGRESS NOTES
PHYSICAL THERAPY - DAILY TREATMENT NOTE    Patient Name: Ashley Knox        Date: 2020  : 1955   YES Patient  Verified  Visit #:   4     Insurance: Payor: Garcia Morales / Plan: Anya Castañeda HMO / Product Type: HMO /      In time: 2:05 Out time: 2:50   Total Treatment Time: 45     Medicare/[facility] Time Tracking (below)   Total Timed Codes (min):  NA 1:1 Treatment Time:  NA     TREATMENT AREA =  Lymphedema, not elsewhere classified [I89.0]    SUBJECTIVE    Pain Level (on 0 to 10 scale):  4  / 10   Medication Changes/New allergies or changes in medical history, any new surgeries or procedures? NO     If yes, update Summary List   Subjective Functional Status/Changes:  []  No changes reported     \"My legs were aching and itching last night. \"          OBJECTIVE    45 min Manual Therapy: manual lymph drainage and compression bandaging, IASTM to the posterior tibialis on the right LE with use of dynamic and static plastic massage cups   Rationale:      decrease pain, increase ROM, increase tissue extensibility and decrease edema  to improve patient's ability to ambulate with decreased LE heaviness. min Patient Education:  YES  Reviewed HEP   []  Progressed/Changed HEP based on:   Patient reports compliance     Other Objective/Functional Measures:    Pt demonstrates an additional 2 inch increase in ankle girth with no TTP or remaining symptoms upon dofifng bandages. Pt reports decreased right calf pain following IASTM. See flowsheet for more details. Post Treatment Pain Level (on 0 to 10) scale:    0 / 10     ASSESSMENT    Assessment/Changes in Function:     WIll continue compression bandaging until within 1-2 cm of contralateral LE at all landmarks or plateau in decreased girth measurements. WIll continue to progress strengthening/AROM per activity tolerance.      []  See Progress Note/Recertification   Patient will continue to benefit from skilled PT services to modify and progress therapeutic interventions, address functional mobility deficits, address ROM deficits, address strength deficits, analyze and address soft tissue restrictions, analyze and cue movement patterns, analyze and modify body mechanics/ergonomics and assess and modify postural abnormalities to attain remaining goals. Progress toward goals / Updated goals: Addressed LE decongestion with compression bandaging.      PLAN    [x]  Upgrade activities as tolerated YES Continue plan of care   []  Discharge due to :    []  Other:      Therapist: Ja Villalobos    Date: 8/14/2020 Time: 4:30 PM     Future Appointments   Date Time Provider Sun Ortiz   8/17/2020  2:00 PM Jovanny Bennett [facility]   8/19/2020  2:00 PM Jobie Chyle [facility]   8/21/2020  4:15 PM Jovanny Bennett [facility]   8/24/2020  2:00 PM Jobie Chyle [facility]   8/26/2020  2:00 PM Jobie Chyle [facility]   8/28/2020  2:00 PM Jobie Chyle [facility]   8/31/2020  2:00 PM Jobie Chyle [facility]   9/2/2020  2:00 PM Jobie Chyle [facility]   9/4/2020  2:00 PM Jobie Chyle [facility]

## 2020-08-17 ENCOUNTER — APPOINTMENT (OUTPATIENT)
Dept: PHYSICAL THERAPY | Age: 65
End: 2020-08-17
Payer: COMMERCIAL

## 2020-08-19 ENCOUNTER — HOSPITAL ENCOUNTER (OUTPATIENT)
Dept: PHYSICAL THERAPY | Age: 65
Discharge: HOME | End: 2020-08-19
Payer: COMMERCIAL

## 2020-08-19 ENCOUNTER — TELEPHONE (OUTPATIENT)
Dept: FAMILY MEDICINE CLINIC | Age: 65
End: 2020-08-19

## 2020-08-19 PROCEDURE — 97140 MANUAL THERAPY 1/> REGIONS: CPT

## 2020-08-19 PROCEDURE — 97535 SELF CARE MNGMENT TRAINING: CPT

## 2020-08-19 NOTE — TELEPHONE ENCOUNTER
Patient states that she needs a note for her employer that gives her restrictions due to her leg pains in both legs. At her job she has to walk up and down ramps. Please advise [phone].

## 2020-08-19 NOTE — PROGRESS NOTES
PHYSICAL THERAPY - DAILY TREATMENT NOTE    Patient Name: Chaz Sena        Date: 2020  : 1955   YES Patient  Verified  Visit #:   5     Insurance: Payor: Akil Francisco / Plan: Kaelyn Brock West HMO / Product Type: HMO /      In time: 1:55 Out time: 2:45   Total Treatment Time: 50     Medicare/[facility] Time Tracking (below)   Total Timed Codes (min):  NA 1:1 Treatment Time:  NA     TREATMENT AREA =  Lymphedema, not elsewhere classified [I89.0]    SUBJECTIVE    Pain Level (on 0 to 10 scale): 8    10   Medication Changes/New allergies or changes in medical history, any new surgeries or procedures? NO     If yes, update Summary List   Subjective Functional Status/Changes:  []  No changes reported     \"I have a lot of pain in my right lower foot. \"          OBJECTIVE    40 min Manual Therapy: manual lymph drainage and compression bandaging   Rationale:      decrease pain, increase ROM, increase tissue extensibility and decrease edema  to improve patient's ability to ambulate with decreased LE heaviness. 10 min Self-Care: Donning and doffing of garment   Rationale: To improve skin health and prevent increased edema. min Patient Education:  YES  Reviewed HEP   []  Progressed/Changed HEP based on:   Patient reports compliance     Other Objective/Functional Measures:    Pt demonstrates increased girth due to missed appointment Monday. See flowsheet for more details. Initiated right LE lymphedema care with appropriate capillary refill and no discomfort. Post Treatment Pain Level (on 0 to 10) scale:   0  / 10     ASSESSMENT    Assessment/Changes in Function:     WIll continue compression bandaging until within 1-2 cm of contralateral LE at all landmarks or plateau in decreased girth measurements. WIll continue to progress strengthening/AROM per activity tolerance.      []  See Progress Note/Recertification   Patient will continue to benefit from skilled PT services to modify and progress therapeutic interventions, address functional mobility deficits, address ROM deficits, address strength deficits, analyze and address soft tissue restrictions, analyze and cue movement patterns, analyze and modify body mechanics/ergonomics and assess and modify postural abnormalities to attain remaining goals. Progress toward goals / Updated goals: Addressed LE decongestion with compression bandaging.      PLAN    [x]  Upgrade activities as tolerated YES Continue plan of care   []  Discharge due to :    []  Other:      Therapist: Stephen Mg    Date: 8/19/2020 Time: 1:59 PM     Future Appointments   Date Time Provider Sun Ortiz   8/19/2020  2:00 PM Mariposa Hoh [facility]   8/21/2020  4:15 PM Mariposa Hoh [facility]   8/24/2020  2:00 PM Herrera Filbert [facility]   8[facility]26[facility]2020  [facility]   8/28/2020  2:00 PM Herrera Filbert [facility]   8/31/2020  2:00 PM Herrera Filbert [facility]   9/2/2020  2:00 PM Herrera Filbert [facility]   9/4/2020  2:00 PM Herrera Filbert [facility]

## 2020-08-19 NOTE — LETTER
NOTIFICATION RETURN TO WORK 
 
8/31/2020 12:30 PM 
 
[address] [address] To Whom It May Concern: 
 
Justin Mejia is [facility] the [facility] She will need to be allowed to have breaks from prolonged standing due to leg pain. She will also need limit going up and down ramps due to do pain. If there are questions or concerns please have the patient contact our office.  
 
 
 
Sincerely, 
 
 
Stacey Whelan NP

## 2020-08-21 ENCOUNTER — HOSPITAL ENCOUNTER (OUTPATIENT)
Dept: PHYSICAL THERAPY | Age: 65
Discharge: HOME | End: 2020-08-21
Payer: COMMERCIAL

## 2020-08-21 PROCEDURE — 97140 MANUAL THERAPY 1/> REGIONS: CPT

## 2020-08-21 PROCEDURE — 97535 SELF CARE MNGMENT TRAINING: CPT

## 2020-08-21 NOTE — PROGRESS NOTES
PHYSICAL THERAPY - DAILY TREATMENT NOTE    Patient Name: Felipa Minor        Date: 2020  : 1955   YES Patient  Verified  Visit #:   6     Insurance: Payor: Wendyrazia Ivan / Plan: Davonte Luong HMO / Product Type: HMO /      In time: 4:25 Out time: 5:25   Total Treatment Time: 60     Medicare/[facility] Time Tracking (below)   Total Timed Codes (min):  60 1:1 Treatment Time:  60     TREATMENT AREA =  Lymphedema, not elsewhere classified [I89.0]    SUBJECTIVE    Pain Level (on 0 to 10 scale):  2  / 10   Medication Changes/New allergies or changes in medical history, any new surgeries or procedures? NO     If yes, update Summary List   Subjective Functional Status/Changes:  []  No changes reported     \"I actually feel a lot better. Btter than I though I would. When will they contact me about my stockings. \"          OBJECTIVE    Modalities Rationale: To improve activity tolerance for exercise performance and ADL's.    min [] Estim, type/location:                                      []  att     []  unatt     []  w/US     []  w/ice    []  w/heat    min []  Mechanical Traction: type/lbs                   []  pro   []  sup   []  int   []  cont    []  before manual    []  after manual    min []  Ultrasound, settings/location:      min []  Iontophoresis w/ dexamethasone, location:                                               []  take home patch       []  in clinic   NA min []  Ice     []  Heat    location/position:     min []  Vasopneumatic Device, press/temp:     min []  Other:    [x] Skin assessment post-treatment (if applicable):   [x]  intact    []  redness- no adverse reaction     []redness - adverse reaction:        45 min Manual Therapy: manual lymph drainage and compression bandaging   Rationale:      decrease pain, increase ROM, increase tissue extensibility and decrease edema  to improve patient's ability to ambulate with decreased LE heaviness.     15 min Self-Care: Garment sizing and garment selection facilitaiton   Rationale: To improve skin health and prevent increased edema. min Patient Education:  YES  Reviewed HEP   []  Progressed/Changed HEP based on:   Patient reports compliance     Other Objective/Functional Measures:    Pt demonstrates up to 2 cm decreased girth in the right LE. Pt does exhibit a large and hardened mass on the medial arch not previously noticied due to nearby edema. Will monitor calloused mass and direct to attention of the appropriate physician as needed depending on symptom development. Post Treatment Pain Level (on 0 to 10) scale:   0  / 10     ASSESSMENT    Assessment/Changes in Function:     WIll continue compression bandaging until within 1-2 cm of contralateral LE at all landmarks or plateau in decreased girth measurements. WIll continue to progress strengthening/AROM per activity tolerance. []  See Progress Note/Recertification   Patient will continue to benefit from skilled PT services to modify and progress therapeutic interventions, address functional mobility deficits, address ROM deficits, address strength deficits, analyze and address soft tissue restrictions, analyze and cue movement patterns, analyze and modify body mechanics/ergonomics and assess and modify postural abnormalities to attain remaining goals. Progress toward goals / Updated goals: Addressed LE decongestion with compression bandaging.      PLAN    [x]  Upgrade activities as tolerated YES Continue plan of care   []  Discharge due to :    []  Other:      Therapist: Minh Page    Date: 8/21/2020 Time: 6:43 PM     Future Appointments   Date Time Provider Sun Ortiz   8/24/2020  2:00 PM Arlette Daniels [facility]   8/26/2020  2:00 PM [facility]   8/28/2020  2:00 PM [facility]   8/31/2020  2:00 PM [facility]   9/2/2020  2:00 PM [facility]   9/4/2020  7:00 AM Giovana Boswell  Meeting [facility] SCHED   9/4/2020  [facility]:00 [facility]

## 2020-08-21 NOTE — TELEPHONE ENCOUNTER
Pt came into office to check status of work note requested from provider to state that needs to be restricted on how long that she can stand and that she is unable to go up and down ramps due to the pain and swelling on her legs.  Please adv [phone]

## 2020-08-24 ENCOUNTER — HOSPITAL ENCOUNTER (OUTPATIENT)
Dept: PHYSICAL THERAPY | Age: 65
Discharge: HOME | End: 2020-08-24
Payer: COMMERCIAL

## 2020-08-24 PROCEDURE — 97140 MANUAL THERAPY 1/> REGIONS: CPT

## 2020-08-24 NOTE — PROGRESS NOTES
PHYSICAL THERAPY - DAILY TREATMENT NOTE    Patient Name: Neda Reason        Date: 2020  : 1955   YES Patient  Verified  Visit #:   7     Insurance: Payor: Albert Luong / Plan: Dylon Pablo HMO / Product Type: HMO /      In time: 2:00 Out time: 2:55   Total Treatment Time: 55     Medicare/[facility] Time Tracking (below)   Total Timed Codes (min):  NA 1:1 Treatment Time:  NA     TREATMENT AREA =  Lymphedema, not elsewhere classified [I89.0]    SUBJECTIVE    Pain Level (on 0 to 10 scale):  0  / 10   Medication Changes/New allergies or changes in medical history, any new surgeries or procedures? NO     If yes, update Summary List   Subjective Functional Status/Changes:  []  No changes reported     \"I don't hurt right now, but I had to take everything off because it was aching. I noticed my legs were really small but they swelled back up. \"          OBJECTIVE    Modalities Rationale: To improve activity tolerance for exercise performance and ADL's.    min [] Estim, type/location:                                      []  att     []  unatt     []  w/US     []  w/ice    []  w/heat    min []  Mechanical Traction: type/lbs                   []  pro   []  sup   []  int   []  cont    []  before manual    []  after manual    min []  Ultrasound, settings/location:      min []  Iontophoresis w/ dexamethasone, location:                                               []  take home patch       []  in clinic   NA min []  Ice     []  Heat    location/position:     min []  Vasopneumatic Device, press/temp:     min []  Other:    [x] Skin assessment post-treatment (if applicable):   [x]  intact    []  redness- no adverse reaction     []redness - adverse reaction:    55 min Manual Therapy: manual lymph drainage and compression bandaging   Rationale:      decrease pain, increase ROM, increase tissue extensibility and decrease edema  to improve patient's ability to ambulate with decreased LE heaviness.        min Patient Education:  YES  Reviewed HEP   []  Progressed/Changed HEP based on:   Patient reports compliance     Other Objective/Functional Measures:    Pt's girht increased to levels of the first visit. Pt had no pain with manual lymph drainage and appropriate capillary response with compression bandaging. See flowsheet for more details. Post Treatment Pain Level (on 0 to 10) scale:   0  / 10     ASSESSMENT    Assessment/Changes in Function:     WIll continue compression bandaging until within 1-2 cm of contralateral LE at all landmarks or plateau in decreased girth measurements. WIll continue to progress strengthening/AROM per activity tolerance. []  See Progress Note/Recertification   Patient will continue to benefit from skilled PT services to modify and progress therapeutic interventions, address functional mobility deficits, address ROM deficits, address strength deficits, analyze and address soft tissue restrictions, analyze and cue movement patterns, analyze and modify body mechanics/ergonomics and assess and modify postural abnormalities to attain remaining goals. Progress toward goals / Updated goals: Addressed LE decongestion with compression bandaging.      PLAN    [x]  Upgrade activities as tolerated YES Continue plan of care   []  Discharge due to :    []  Other:      Therapist: Matthew Beckham    Date: 8/24/2020 Time: 2:10 PM     Future Appointments   Date Time Provider Sun Ortiz   8/26/2020  2:00 PM Pamela Aquino [facility]   8/28/2020  2:00 PM Araseli Dace [facility]   8/31/2020  2:00 PM Araseli Dace [facility]   9/2/2020  2:00 PM Araseli Dace [facility]   9/4/2020  7:00 AM Sean Villeda NP 11 [facility]   9/4/2020  2:00 PM Araseli Dace [facility]

## 2020-08-26 ENCOUNTER — HOSPITAL ENCOUNTER (OUTPATIENT)
Dept: PHYSICAL THERAPY | Age: 65
Discharge: HOME | End: 2020-08-26
Payer: COMMERCIAL

## 2020-08-26 PROCEDURE — 97140 MANUAL THERAPY 1/> REGIONS: CPT

## 2020-08-26 NOTE — PROGRESS NOTES
PHYSICAL THERAPY - DAILY TREATMENT NOTE    Patient Name: Jessica Dunaway        Date: 2020  : 1955   YES Patient  Verified  Visit #:     Insurance: Payor: Giacomo Guillermo / Plan: Leia Patel HMO / Product Type: HMO /      In time: 2:01 Out time: 2:55   Total Treatment Time: 54     Medicare/[facility] Time Tracking (below)   Total Timed Codes (min):  NA 1:1 Treatment Time:  NA     TREATMENT AREA =  Lymphedema, not elsewhere classified [I89.0]    SUBJECTIVE    Pain Level (on 0 to 10 scale):  0  / 10   Medication Changes/New allergies or changes in medical history, any new surgeries or procedures? NO     If yes, update Summary List   Subjective Functional Status/Changes:  []  No changes reported     \"I think they went back down this time. \"          OBJECTIVE    Modalities Rationale: To improve activity tolerance for exercise performance and ADL's.    min [] Estim, type/location:                                      []  att     []  unatt     []  w/US     []  w/ice    []  w/heat    min []  Mechanical Traction: type/lbs                   []  pro   []  sup   []  int   []  cont    []  before manual    []  after manual    min []  Ultrasound, settings/location:      min []  Iontophoresis w/ dexamethasone, location:                                               []  take home patch       []  in clinic   NA min []  Ice     []  Heat    location/position:     min []  Vasopneumatic Device, press/temp:     min []  Other:    [x] Skin assessment post-treatment (if applicable):   [x]  intact    []  redness- no adverse reaction     []redness - adverse reaction:      54 min Manual Therapy: manual lymph drainage and compression bandaging   Rationale:      decrease pain, increase ROM, increase tissue extensibility and decrease edema  to improve patient's ability to ambulate with decreased LE heaviness. NA min Self-Care:    Rationale: To improve skin health and prevent increased edema.      min Patient Education:  Adrienne Bagley Reviewed HEP   []  Progressed/Changed HEP based on:   Patient reports compliance     Other Objective/Functional Measures:    Pt demonstrates return to reduced girth levels this visit aside from edema between the navicular and ankle girth measures. Edema decreases with manual lymph drainage. See flowsheet for girth details. Post Treatment Pain Level (on 0 to 10) scale:   0  / 10     ASSESSMENT    Assessment/Changes in Function:     WIll continue compression bandaging until within 1-2 cm of contralateral LE at all landmarks or plateau in decreased girth measurements. WIll continue to progress strengthening/AROM per activity tolerance. []  See Progress Note/Recertification   Patient will continue to benefit from skilled PT services to modify and progress therapeutic interventions, address functional mobility deficits, address ROM deficits, address strength deficits, analyze and address soft tissue restrictions, analyze and cue movement patterns, analyze and modify body mechanics/ergonomics and assess and modify postural abnormalities to attain remaining goals. Progress toward goals / Updated goals: Addressed LE decongestion with compression bandaging.      PLAN    [x]  Upgrade activities as tolerated YES Continue plan of care   []  Discharge due to :    []  Other:      Therapist: Fern Graham    Date: 8/26/2020 Time: 7:12 PM     Future Appointments   Date Time Provider Sun Ortiz   8/28/2020  2:00 PM Poli Min [facility]   8/31[facility]2020  [facility]   [facility]/2/2020  2:00 [facility]   9/4/2020  7:00 AM Nicole Johnson NP 11 [facility]   9/4/2020  2:00 PM Russ Dominguez [facility]

## 2020-08-28 ENCOUNTER — HOSPITAL ENCOUNTER (OUTPATIENT)
Dept: PHYSICAL THERAPY | Age: 65
Discharge: HOME | End: 2020-08-28
Payer: COMMERCIAL

## 2020-08-28 PROCEDURE — 97140 MANUAL THERAPY 1/> REGIONS: CPT

## 2020-08-28 NOTE — PROGRESS NOTES
PHYSICAL THERAPY - DAILY TREATMENT NOTE    Patient Name: Medardo Estrada        Date: 2020  : 1955   YES Patient  Verified  Visit #:     Insurance: Payor: Jessica Chance / Plan: Brittaney Channel HMO / Product Type: HMO /      In time: 2:08 Out time: 3:05   Total Treatment Time: 57     Medicare/[facility] Time Tracking (below)   Total Timed Codes (min):  NA 1:1 Treatment Time:  NA     TREATMENT AREA =  Lymphedema, not elsewhere classified [I89.0]    SUBJECTIVE    Pain Level (on 0 to 10 scale):  0  / 10   Medication Changes/New allergies or changes in medical history, any new surgeries or procedures? NO     If yes, update Summary List   Subjective Functional Status/Changes:  []  No changes reported     \"I had so much pain Wednesday night. I felt like going to the ER. It was aching in the back of my right heel like a toothache. It ached for 15-20 minutese when I got up but then it felt better. \"          OBJECTIVE    Modalities Rationale:  To improve activity tolerance for exercise performance and ADL's.    min [] Estim, type/location:                                      []  att     []  unatt     []  w/US     []  w/ice    []  w/heat    min []  Mechanical Traction: type/lbs                   []  pro   []  sup   []  int   []  cont    []  before manual    []  after manual    min []  Ultrasound, settings/location:      min []  Iontophoresis w/ dexamethasone, location:                                               []  take home patch       []  in clinic   NA min []  Ice     []  Heat    location/position:     min []  Vasopneumatic Device, press/temp:     min []  Other:    [x] Skin assessment post-treatment (if applicable):   [x]  intact    []  redness- no adverse reaction     []redness - adverse reaction:    57 min Manual Therapy: manual lymph drainage and compression bandaging   Rationale:      decrease pain, increase ROM, increase tissue extensibility and decrease edema  to improve patient's ability to ambulate with decreased LE heaviness. min Patient Education:  YES  Reviewed HEP   []  Progressed/Changed HEP based on:   Patient reports compliance     Other Objective/Functional Measures:    Pt demonstrates an additional 1.5 cm decrease in girth since last visit. Pt is not TTP and has no pain with WB along the right achilles tendon. Post Treatment Pain Level (on 0 to 10) scale:   0  / 10     ASSESSMENT    Assessment/Changes in Function:     WIll continue compression bandaging until within 1-2 cm of contralateral LE at all landmarks or plateau in decreased girth measurements. WIll continue to progress strengthening/AROM per activity tolerance. []  See Progress Note/Recertification   Patient will continue to benefit from skilled PT services to modify and progress therapeutic interventions, address functional mobility deficits, address ROM deficits, address strength deficits, analyze and address soft tissue restrictions, analyze and cue movement patterns, analyze and modify body mechanics/ergonomics and assess and modify postural abnormalities to attain remaining goals. Progress toward goals / Updated goals: Addressed LE decongestion with compression bandaging.      PLAN    [x]  Upgrade activities as tolerated YES Continue plan of care   []  Discharge due to :    []  Other:      Therapist: Yolanda Drake    Date: 8/28/2020 Time: 2:12 PM     Future Appointments   Date Time Provider Sun Ortiz   8/31/2020  2:00 PM Tito Mendes [facility]   [facility]2020  [facility]   9/4/2020  7:00 AM Blaine Don NP 11 [facility]   9/4/2020  2:00 PM Luis Wright [facility]

## 2020-08-31 ENCOUNTER — HOSPITAL ENCOUNTER (OUTPATIENT)
Dept: PHYSICAL THERAPY | Age: 65
Discharge: HOME | End: 2020-08-31
Payer: COMMERCIAL

## 2020-08-31 PROCEDURE — 97140 MANUAL THERAPY 1/> REGIONS: CPT

## 2020-08-31 NOTE — PROGRESS NOTES
PHYSICAL THERAPY - DAILY TREATMENT NOTE    Patient Name: Alphonse Hilton        Date: 2020  : 1955   YES Patient  Verified  Visit #:   10     Insurance: Payor: Christopher Batistaist / Plan: Kenyatta Danielson HMO / Product Type: HMO /      In time: 2:03 Out time: 3:00   Total Treatment Time: 57     Medicare/[employer] Time Tracking (below)   Total Timed Codes (min):  NA 1:1 Treatment Time:  NA     TREATMENT AREA =  Lymphedema, not elsewhere classified [I89.0]    SUBJECTIVE    Pain Level (on 0 to 10 scale):  0  / 10   Medication Changes/New allergies or changes in medical history, any new surgeries or procedures? NO     If yes, update Summary List   Subjective Functional Status/Changes:  []  No changes reported     \"I had a lot of aching in the back of my heel again on the weekend. Enough to call out of work. \"          OBJECTIVE    Modalities Rationale: To improve activity tolerance for exercise performance and ADL's.    min [] Estim, type/location:                                      []  att     []  unatt     []  w/US     []  w/ice    []  w/heat    min []  Mechanical Traction: type/lbs                   []  pro   []  sup   []  int   []  cont    []  before manual    []  after manual    min []  Ultrasound, settings/location:      min []  Iontophoresis w/ dexamethasone, location:                                               []  take home patch       []  in clinic   NA min []  Ice     []  Heat    location/position:     min []  Vasopneumatic Device, press/temp:     min []  Other:    [x] Skin assessment post-treatment (if applicable):   [x]  intact    []  redness- no adverse reaction     []redness - adverse reaction:      57 min Manual Therapy: manual lymph drainage and compression bandaging   Rationale:      decrease pain, increase ROM, increase tissue extensibility and decrease edema  to improve patient's ability to ambulate with decreased LE heaviness.      min Patient Education:  YES  Reviewed HEP   [] Progressed/Changed HEP based on:   Patient reports compliance     Other Objective/Functional Measures:    Pt was palpated with no significant findings or TTP while bandages were off. No ligamentous laxity of sharp pain indicating injury. Pt demonstrates minimal change in girth and is approaching discharge once independent with garment donning and doffing. Post Treatment Pain Level (on 0 to 10) scale:   0  / 10     ASSESSMENT    Assessment/Changes in Function:     WIll continue compression bandaging until within 1-2 cm of contralateral LE at all landmarks or plateau in decreased girth measurements  WIll continue to progress strengthening/AROM per activity tolerance. []  See Progress Note/Recertification   Patient will continue to benefit from skilled PT services to modify and progress therapeutic interventions, address functional mobility deficits, address ROM deficits, address strength deficits, analyze and address soft tissue restrictions, analyze and cue movement patterns, analyze and modify body mechanics/ergonomics and assess and modify postural abnormalities to attain remaining goals. Progress toward goals / Updated goals: Addressed LE decongestion with compression bandaging.      PLAN    [x]  Upgrade activities as tolerated YES Continue plan of care   []  Discharge due to :    []  Other:      Therapist: Darnell Whitten    Date: 8/31/2020 Time: 3:26 PM     Future Appointments   Date Time Provider Sun Ortiz   9/2/2020  4:15 PM Erum CORNELIUS   9/4/2020  7:00 AM Benton Boxer, NP 11 [facility]   9/4/2020  4:15 PM Lorenzo Bautista [facility]

## 2020-08-31 NOTE — PROGRESS NOTES
[address] [place] at [address] [place] [address]                              [address] [place] [address] Angelina Villanueva                        Phone: [phone]  Fax: [phone] OF [address] THERAPY          Patient Name: Sushant Pathak : 1955   Treatment/Medical Diagnosis: Lymphedema, not elsewhere classified [I89.0]   Onset Date: Chronic 1 year    Referral Source: Lyn Diane NP Start of Care [facility]): 2020   Prior Hospitalization: See Medical History Provider #: [id number]   Prior Level of Function: Independent with ADL's   Comorbidities: HTN   Medications: Verified on Patient Summary List   Visits from [facility]: 10 Missed Visits: 1     Goal/Measure of Progress Goal Met? 1. Pt will be demonstrate at last 5 degrees of dorsi flexion AROM bilaterally to ambulate with symmetric WB.    Status at last Eval: -5 right  3 left Current Status: 3 right  6 left Progressing   2. Patient will demonstrate decongestion of limb by 8% to improve efficiency with ambulation. Status at last Eval: NA Current Status: 10.5% yes   3. Patient will measure LE girth at the ankle and calf within 1-2 cm of the therapist to indicate efficiency with long-term lymphedema management. Status at last Eval: NA Current Status: independent, accurate yes     Therapy has consisted of a complete decongestive therapy protocol including manual lymph drainage, pt education on garment donning and doffing, pt education on a skin care routine, compression bandaging and garment fitting. See below for girth reduction and progress towards goals.      Girth (cm):  Right LE  2020 Left LE  2020 Right LE  2020 Left LE 2020   MTP: 23 24 22 22   Midfoot/navicular: 25 25 23.5 25   Ankle: 27.5 28.5 25 25   Calf:     (26 cm from malleolus) 39.5 40 41 37.5   Knee:    (2 cm from popliteus) 39 41.5 42 38.5   Thigh     (8 cm fron knee) 47.5 46.5 46 46   Groin: 70 69.5 Not tested Not tested       Problem List: pain affecting function, decrease ROM, decrease strength, edema affecting function, impaired gait/ balance, decrease ADL/ functional abilitiies, decrease activity tolerance, decrease flexibility/ joint mobility and decrease transfer abilities   Treatment Plan may include any combination of the following: Therapeutic exercise, Therapeutic activities, Neuromuscular re-education, Physical agent/modality, Gait/balance training, Manual therapy, Patient education, Self Care training, Functional mobility training, Home safety training and Stair training   Goals for this certification period include and are to be achieved in   1-2 weeks:  1. Pt will be Independent with compression management routine to maintain LE girth measures. Pt will increase strength of bilateral plantar flexors to at least 4+/5 to improve efficiency with push off in gait. Frequency / Duration:   Patient to be seen   2-3   times per week for   1-2  weeks:    Assessments/Recommendations: The patient would continue to benefit from skilled interventions to address the above mentioned functional deficits. See above for more details. If you have any questions/comments please contact us directly at [phone]. Thank you for allowing us to assist in the care of your patient. Therapist Signature: Nargis Barriga DPT Date: 2/29/9374   Certification Period:  Reporting Period: NA  8/7/2020 - 8/31/2020 Time: 3:30 PM   NOTE TO PHYSICIAN:  PLEASE COMPLETE THE ORDERS BELOW AND FAX TO   [facility] Physical Therapy: [phone]   If you are unable to process this request in 24 hours please contact our office: [phone].    ___ I have read the above report and request that my patient continue as recommended.   ___ I have read the above report and request that my patient continue therapy with the following changes/special instructions: ________________________________________________   ___ I have read the above report and request that my patient be discharged from therapy.      Physician Signature:        Date:       Time:

## 2020-09-02 ENCOUNTER — HOSPITAL ENCOUNTER (OUTPATIENT)
Dept: PHYSICAL THERAPY | Age: 65
Discharge: HOME | End: 2020-09-02
Payer: COMMERCIAL

## 2020-09-02 PROCEDURE — 97140 MANUAL THERAPY 1/> REGIONS: CPT

## 2020-09-02 PROCEDURE — 97535 SELF CARE MNGMENT TRAINING: CPT

## 2020-09-02 NOTE — PROGRESS NOTES
PHYSICAL THERAPY - DAILY TREATMENT NOTE    Patient Name: Vanessa Judd        Date: 2020  : 1955   YES Patient  Verified  Visit #:     Insurance: Payor: Delia Negrete / Plan: [employer] HMO / Product Type: HMO /      In time: 4:30 Out time: 5:30   Total Treatment Time: 60     Medicare/BCBS Time Tracking (below)   Total Timed Codes (min):  NA 1:1 Treatment Time:  NA     TREATMENT AREA =  Lymphedema, not elsewhere classified [I89.0]    SUBJECTIVE    Pain Level (on 0 to 10 scale):   0 / 10   Medication Changes/New allergies or changes in medical history, any new surgeries or procedures? NO     If yes, update Summary List   Subjective Functional Status/Changes:  []  No changes reported     \"I feel real good today Lilly no pain. \"          OBJECTIVE    Modalities Rationale: To improve activity tolerance for exercise performance and ADL's.    min [] Estim, type/location:                                      []  att     []  unatt     []  w/US     []  w/ice    []  w/heat    min []  Mechanical Traction: type/lbs                   []  pro   []  sup   []  int   []  cont    []  before manual    []  after manual    min []  Ultrasound, settings/location:      min []  Iontophoresis w/ dexamethasone, location:                                               []  take home patch       []  in clinic   NA min []  Ice     []  Heat    location/position:     min []  Vasopneumatic Device, press/temp:     min []  Other:    [x] Skin assessment post-treatment (if applicable):   [x]  intact    []  redness- no adverse reaction     []redness - adverse reaction:    45 min Manual Therapy: manual lymph drainage and compression bandaging   Rationale:      decrease pain, increase ROM, increase tissue extensibility and decrease edema  to improve patient's ability to ambulate with decreased LE heaviness.     15 min Self-Care: Garment donning and doffing education, Facilitation of garment order, garment fitting   Rationale: To improve skin health and prevent increased edema. min Patient Education:  YES  Reviewed HEP   []  Progressed/Changed HEP based on:   Patient reports compliance     Other Objective/Functional Measures:    Pt demonstrate minimal change in girth aside from a 1.5 cm reduction in girth at bilateraly ankles. Pt did not order her garment when she went home two days ago. Pt's  presented to the clinic to facilitate order in person. Post Treatment Pain Level (on 0 to 10) scale:   0  / 10     ASSESSMENT    Assessment/Changes in Function:     Pt is understanding she will transfer to the a different therapist for garment donning and doffing education once they arrive and will finish compression bandaging next visit. []  See Progress Note/Recertification   Patient will continue to benefit from skilled PT services to modify and progress therapeutic interventions, address functional mobility deficits, address ROM deficits, address strength deficits, analyze and address soft tissue restrictions, analyze and cue movement patterns, analyze and modify body mechanics/ergonomics and assess and modify postural abnormalities to attain remaining goals. Progress toward goals / Updated goals: Addressed LE decongestion with compression bandaging. Addressed home routine independence with self care noted above.      PLAN    [x]  Upgrade activities as tolerated YES Continue plan of care   []  Discharge due to :    []  Other:      Therapist: Lynn Babinski    Date: 9/2/2020 Time: 7:04 PM     Future Appointments   Date Time Provider Sun Ortiz   9/4/2020  7:00 AM Fercho Herron NP [facility]   9/4/2020  4:15 PM Wright Kayser [facility]   9/9/2020 10:30 AM Yousif Chawla [facility]

## 2020-09-04 ENCOUNTER — HOSPITAL ENCOUNTER (OUTPATIENT)
Dept: PHYSICAL THERAPY | Age: 65
Discharge: HOME | End: 2020-09-04
Payer: COMMERCIAL

## 2020-09-04 ENCOUNTER — OFFICE VISIT (OUTPATIENT)
Dept: FAMILY MEDICINE CLINIC | Age: 65
End: 2020-09-04

## 2020-09-04 VITALS
RESPIRATION RATE: 18 BRPM | DIASTOLIC BLOOD PRESSURE: 83 MMHG | HEART RATE: 85 BPM | OXYGEN SATURATION: 99 % | HEIGHT: 66 IN | BODY MASS INDEX: 33.27 KG/M2 | WEIGHT: 207 LBS | SYSTOLIC BLOOD PRESSURE: 152 MMHG | TEMPERATURE: 97.2 F

## 2020-09-04 DIAGNOSIS — I89.0 LYMPHEDEMA: Primary | ICD-10-CM

## 2020-09-04 DIAGNOSIS — M76.62 ACHILLES TENDINITIS OF BOTH LOWER EXTREMITIES: ICD-10-CM

## 2020-09-04 DIAGNOSIS — M76.61 ACHILLES TENDINITIS OF BOTH LOWER EXTREMITIES: ICD-10-CM

## 2020-09-04 DIAGNOSIS — Z12.11 SCREENING FOR COLORECTAL CANCER: ICD-10-CM

## 2020-09-04 DIAGNOSIS — Z12.12 SCREENING FOR COLORECTAL CANCER: ICD-10-CM

## 2020-09-04 PROCEDURE — 97535 SELF CARE MNGMENT TRAINING: CPT

## 2020-09-04 PROCEDURE — 97140 MANUAL THERAPY 1/> REGIONS: CPT

## 2020-09-04 RX ORDER — TRIAMCINOLONE ACETONIDE 5 MG/G
CREAM TOPICAL 2 TIMES DAILY
Qty: 30 G | Refills: 0 | Status: SHIPPED | OUTPATIENT
Start: 2020-09-04

## 2020-09-04 RX ORDER — TRAMADOL HYDROCHLORIDE 50 MG/1
50 TABLET ORAL
Qty: 20 TAB | Refills: 0 | Status: SHIPPED | OUTPATIENT
Start: 2020-09-04 | End: 2020-09-09

## 2020-09-04 NOTE — PROGRESS NOTES
PHYSICAL THERAPY - DAILY TREATMENT NOTE    Patient Name: Carroll Tamayo        Date: 2020  : 1955   YES Patient  Verified  Visit #:     Insurance: Payor: Ian Street / Plan: [address] [place] [address] [place] HMO / Product Type: HMO /      In time: 4:30 Out time: 5:30   Total Treatment Time: 60     Medicare/[facility] Time Tracking (below)   Total Timed Codes (min):  NA 1:1 Treatment Time:  NA     TREATMENT AREA =  Lymphedema, not elsewhere classified [I89.0]    SUBJECTIVE    Pain Level (on 0 to 10 scale):  0  / 10   Medication Changes/New allergies or changes in medical history, any new surgeries or procedures? NO     If yes, update Summary List   Subjective Functional Status/Changes:  []  No changes reported     \"I am so sore in the back of my heel, but my legs are so much smaller. \"          OBJECTIVE    Modalities Rationale: To improve activity tolerance for exercise performance and ADL's.    min [] Estim, type/location:                                      []  att     []  unatt     []  w/US     []  w/ice    []  w/heat    min []  Mechanical Traction: type/lbs                   []  pro   []  sup   []  int   []  cont    []  before manual    []  after manual    min []  Ultrasound, settings/location:      min []  Iontophoresis w/ dexamethasone, location:                                               []  take home patch       []  in clinic   NA min []  Ice     []  Heat    location/position:     min []  Vasopneumatic Device, press/temp:     min []  Other:    [x] Skin assessment post-treatment (if applicable):   [x]  intact    []  redness- no adverse reaction     []redness - adverse reaction:    45 min Manual Therapy: manual lymph drainage and compression bandaging   Rationale:      decrease pain, increase ROM, increase tissue extensibility and decrease edema  to improve patient's ability to ambulate with decreased LE heaviness.     15 min Self-Care: Donning and doffing garment education and demonstration   Rationale: To improve skin health and prevent increased edema. min Patient Education:  YES  Reviewed HEP   []  Progressed/Changed HEP based on:   Patient reports compliance     Other Objective/Functional Measures:    Pt demonstrates up to an inch in decreased calf girth. Due to increased edema in the toes, the pt's toes were wrapped with one folded roll of 2\" mollelast each foot. The pt was wrapped with 1 roll each of rosidal K 8 cm and rosidal soft 12 cm, per leg. See flowsheet for more details. Post Treatment Pain Level (on 0 to 10) scale:   0  / 10     ASSESSMENT    Assessment/Changes in Function:     WIll continue compression bandaging until within 1-2 cm of contralateral LE at all landmarks or plateau in decreased girth measurements. WIll continue to progress strengthening/AROM per activity tolerance. []  See Progress Note/Recertification   Patient will continue to benefit from skilled PT services to modify and progress therapeutic interventions, address functional mobility deficits, address ROM deficits, address strength deficits, analyze and address soft tissue restrictions, analyze and cue movement patterns, analyze and modify body mechanics/ergonomics and assess and modify postural abnormalities to attain remaining goals. Progress toward goals / Updated goals: Addressed LE decongestion with compression bandaging.      PLAN    [x]  Upgrade activities as tolerated YES Continue plan of care   []  Discharge due to :    []  Other:      Therapist: Jose L Hickmna    Date: 9/4/2020 Time: 6:12 PM     Future Appointments   Date Time Provider Sun Ortiz   9/9/2020 10:30 AM Moose Sesay [facility]   9/23/2020  7:30 AM Lissette Wilkes MD [address]

## 2020-09-04 NOTE — PROGRESS NOTES
HISTORY OF PRESENT ILLNESS  Marily Borges is a 59 y.o. female. Patient states she was seen by LEXIE Farmer NP vascular for swelling. She was dx with lymphedema. Comments she is currently under the care of the lymphedema clinic. Reports she is currently only working 1 job. Reports leg pain is very painful making it difficult to walk at times. States at times pain is waking her up out of her sleep due to pain. Reports she has been using diclofenac gel, states at times it helps at other times it doesn't. Reports she did have to go to the ED last month due to pain. Patient does elevate her legs as directed. Reports she is having achilles pain bilaterally. No Known Allergies  Current Outpatient Medications   Medication Sig Dispense Refill    triamterene-hydroCHLOROthiazide (MAXZIDE) 37.5-25 mg per tablet Take 1 Tab by mouth daily. 90 Tab 1    predniSONE (STERAPRED DS) 10 mg dose pack See administration instruction per 10mg dose pack 21 Tab 0    diclofenac (VOLTAREN) 1 % gel Apply  to affected area four (4) times daily. 100 g 0    ibuprofen (MOTRIN) 800 mg tablet Take 1 Tab by mouth every eight (8) hours as needed for Pain (with food).  39 Tab 0     Past Medical History:   Diagnosis Date    Essential hypertension, benign      Social History     Socioeconomic History    Marital status:      Spouse name: Not on file    Number of children: Not on file    Years of education: Not on file    Highest education level: Not on file   Occupational History    Not on file   Social Needs    Financial resource strain: Not on file    Food insecurity     Worry: Not on file     Inability: Not on file    Transportation needs     Medical: Not on file     Non-medical: Not on file   Tobacco Use    Smoking status: Never Smoker    Smokeless tobacco: Never Used   Substance and Sexual Activity    Alcohol use: No    Drug use: Never    Sexual activity: Not on file   Lifestyle    Physical activity     Days per week: Not on file     Minutes per session: Not on file    Stress: Not on file   Relationships    Social connections     Talks on phone: Not on file     Gets together: Not on file     Attends Christianity service: Not on file     Active member of club or organization: Not on file     Attends meetings of clubs or organizations: Not on file     Relationship status: Not on file    Intimate partner violence     Fear of current or ex partner: Not on file     Emotionally abused: Not on file     Physically abused: Not on file     Forced sexual activity: Not on file   Other Topics Concern    Not on file   Social History Narrative    Not on file     Wt Readings from Last 3 Encounters:   09/04/20 207 lb (93.9 kg)   06/15/20 203 lb (92.1 kg)   01/23/20 198 lb (89.8 kg)     BP Readings from Last 3 Encounters:   09/04/20 152/83   06/15/20 150/82   01/23/20 136/78     Review of Systems   Constitutional: Negative for chills and fever. Respiratory: Negative for shortness of breath. Cardiovascular: Positive for leg swelling (bilaterally, lymphedema). Negative for chest pain and palpitations. Neurological: Negative for dizziness and headaches. /83 (BP 1 Location: Left arm, BP Patient Position: Sitting)   Pulse 85   Temp 97.2 °F (36.2 °C) (Temporal)   Resp 18   Ht 5' 6\" (1.676 m)   Wt 207 lb (93.9 kg)   SpO2 99%   BMI 33.41 kg/m²     Physical Exam  Constitutional:       Appearance: She is well-developed. HENT:      Head: Normocephalic and atraumatic. Neck:      Musculoskeletal: Normal range of motion and neck supple. Cardiovascular:      Rate and Rhythm: Normal rate and regular rhythm. Heart sounds: Normal heart sounds. No murmur. No friction rub. No gallop. Comments: Bilateral lower extremities with ACE bandages in place. Pulmonary:      Effort: Pulmonary effort is normal.      Breath sounds: Normal breath sounds. No wheezing, rhonchi or rales. Skin:     General: Skin is warm and dry. Neurological:      Mental Status: She is alert and oriented to person, place, and time. ASSESSMENT and PLAN    ICD-10-CM ICD-9-CM    1. Lymphedema  I89.0 457.1    2. Achilles tendinitis of both lower extremities  M76.61 726.71 REFERRAL TO ORTHOPEDICS    M76.62     3. Screening for colorectal cancer  Z12.11 V76.51 REFERRAL TO GASTROENTEROLOGY    Z12.12 V76.41      I have discussed the diagnosis with the patient and the intended plan as seen in the above orders. The patient has received an after-visit summary and questions were answered concerning future plans. I have discussed medication side effects and warnings with the patient as well. Patient agreeable with above plan and verbalizes understanding. Follow-up and Dispositions    · Return in about 4 months (around 1/4/2021) for HTN.

## 2020-09-04 NOTE — PATIENT INSTRUCTIONS
Lymphedema: Care Instructions  Your Care Instructions     Lymphedema is fluid that builds up in the arms or legs. It is often caused by surgery to remove lymph nodes during cancer treatment, especially breast cancer surgery, which can cause fluid to build up in the arm. It can happen after radiation treatment to an area that involves lymph nodes. It also can be caused by a fractured bone or surgery to fix a fracture. And some medicines also can cause lymphedema. Some people get it for unknown reasons. Normally, lymph nodes trap bacteria and other substances as fluid flows through them. Then, the white cells in the body's defense, or immune, system can destroy the substances. But if there are few or no lymph nodes--or if the lymph system in an arm or leg has been damaged--fluid can build up in the affected arm or leg. You can take simple steps at home to help treat or prevent fluid buildup. Treatment may include raising the arm or leg to let gravity drain the fluid. You also can wear compression stockings or sleeves. Follow-up care is a key part of your treatment and safety. Be sure to make and go to all appointments, and call your doctor if you are having problems. It's also a good idea to know your test results and keep a list of the medicines you take. How can you care for yourself at home? · Wear a compression stocking or sleeve as your doctor suggests. It can help keep fluid from pooling in an arm or leg. Wear it during air travel. · Prop up the swollen arm or leg on a pillow anytime you sit or lie down. Try to keep it above the level of your heart. This will help reduce swelling. · Avoid crossing your legs if your legs are swollen. · Get some exercise on most days of the week. Increase the intensity of exercise slowly. Water aerobics can help reduce swelling by helping fluid move around. Wear your compression stocking or sleeve during exercise, but not during water exercise.   · See a physical therapist. He or she can teach you how to do self-massage to help fluid move around. You also can learn what activities would be best for you. · Keep your feet clean and wear clean socks or stockings every day. Check your feet often for signs of infection, such as redness or heat. Do not walk barefoot. · If you have had lymph nodes removed from under your arm:  ? Do not have blood drawn from the arm on the side of the lymph node surgery. ? Do not allow a blood pressure cuff to be placed on that arm. If you are in the hospital, make sure your nurse and other hospital staff know of your condition. ? Wear gloves when gardening or doing other activities that may lead to cuts on your fingers or hands. · If you have had lymph nodes removed from your groin:  ? Bathe your feet daily in lukewarm, not hot, water. Use a mild soap that has a moisturizer, or use a moisturizer separately. ? Check your feet for blisters or cuts. ? Wear comfortable and supportive shoes that fit properly. ? Wear the correct size of panty hose and stockings. Avoid garters or knee-high or thigh-high stockings. · Ask your doctor how to treat any cuts, scratches, insect bites, or other injuries that may occur. · Use sunscreen and insect repellent when outdoors to protect your skin from sunburn and insect bites. · Wear medical alert jewelry that says you have lymphedema. You can buy these at most drugstores and on the Internet. When should you call for help? Call your doctor now or seek immediate medical care if:    · You have signs of infection, such as:  ? Increased pain, swelling, warmth, or redness. ? Red streaks leading from the area. ? Pus draining from the area. ? A fever. Watch closely for changes in your health, and be sure to contact your doctor if:    · You have new or worse symptoms from lymphedema.     · You do not get better as expected. Where can you learn more?   Go to http://georgi-xuan.info/  Enter Q898 in the search box to learn more about \"Lymphedema: Care Instructions. \"  Current as of: April 29, 2020               Content Version: 12.6  © 2006-2020 [employer]. Care instructions adapted under license by [employer] (which disclaims liability or warranty for this information). If you have questions about a medical condition or this instruction, always ask your healthcare professional. [address] any warranty or liability for your use of this information.

## 2020-09-09 ENCOUNTER — HOSPITAL ENCOUNTER (OUTPATIENT)
Dept: PHYSICAL THERAPY | Age: 65
Discharge: HOME | End: 2020-09-09
Payer: COMMERCIAL

## 2020-09-09 PROCEDURE — 97110 THERAPEUTIC EXERCISES: CPT

## 2020-09-09 PROCEDURE — 97140 MANUAL THERAPY 1/> REGIONS: CPT

## 2020-09-09 NOTE — PROGRESS NOTES
PHYSICAL THERAPY - DAILY TREATMENT NOTE    Patient Name: Garret Balderas        Date: 2020  : 1955   YES Patient  Verified  Visit #:   15   of   18  Insurance: Payor: Ivana Fairbanks / Plan: [address] [place] [address] [place] HMO / Product Type: HMO /      In time: 10:33 Out time: 11:28   Total Treatment Time: 55     Medicare/BCBS Time Tracking (below)   Total Timed Codes (min):  n/a 1:1 Treatment Time:  n/a     TREATMENT AREA =  Lymphedema, not elsewhere classified [I89.0]    SUBJECTIVE    Pain Level (on 0 to 10 scale):  5  / 10   Medication Changes/New allergies or changes in medical history, any new surgeries or procedures? NO     If yes, update Summary List   Subjective Functional Status/Changes:  []  No changes reported     \"I have been in these wraps since last Friday. And they are hurting. I'm a 10/10 sometimes and it really hurt on my right ankle and it's too tight up here (left calf). \"          OBJECTIVE    Modalities Rationale:  To improve activity tolerance for exercise performance and ADL's.    min [] Estim, type/location:                                      []  att     []  unatt     []  w/US     []  w/ice    []  w/heat    min []  Mechanical Traction: type/lbs                   []  pro   []  sup   []  int   []  cont    []  before manual    []  after manual    min []  Ultrasound, settings/location:      min []  Iontophoresis w/ dexamethasone, location:                                               []  take home patch       []  in clinic   n/a min []  Ice     []  Heat    location/position:     min []  Vasopneumatic Device, press/temp:     min []  Other:    [x] Skin assessment post-treatment (if applicable):   [x]  intact    []  redness- no adverse reaction     []redness - adverse reaction:    10 min Therapeutic Exercise:  [x]  See flow sheet   Rationale:      increase ROM and increase strength to improve the patients ability to promote dorsiflexion ROM to normalize gait pattern       45 min Manual Therapy: Removed old wraps. Washed BLE with soap and water and dried with towel. Lubriderm lotion applied to BLE. Multilayer compression bandaging applied to BLE: tg stockinetter size 9, cellona cast padding 10-cm x 2 to each leg, no toe wraps, 10-cm and 12-cm rosidal K bandage using modified 2-layer technique. Rationale:      decrease pain, increase ROM and decrease edema  to improve patient's ability to perform ADLs and functional mobility with improved ease and comfort         min Patient Education:  YES  Reviewed HEP   []  Progressed/Changed HEP based on:   Patient reports compliance     Other Objective/Functional Measures:    Patient presented in same wraps since 5 days ago without adjustment. Mushrooming/muffin top noted left calf at top of wrap. Redness noted along top edge of wraps on left leg. Right ankle tender to palpation along achilles tendon and calcaneus  Left plantar surface of foot sweaty and maceration noted on plantar surface from heel to arch. Re-wrapped today using 10-cm and 12-cm rosidal K with cellona cast padding x 2 10-cm to each leg. Provided patient with 2 10-cm, 2 12-cm, and 4 cellona. Used Tg stockinette to prevent maceration. Deferred rosidal soft foam due to too much moisture retention. Performed        Post Treatment Pain Level (on 0 to 10) scale:   2  / 10     ASSESSMENT    Assessment/Changes in Function:     Patient presented with increased pain in BLE due to having wraps on unadjusted or unaltered for 5 days. Changed bandaging to use tG size 9 cotton stockinette, non-tubigrip, to promote comfort and prevent moisture retention. Did not use rosidal K soft foam due to moisture retention. Used two-layer bandaging modified version to BLE. Patient reported increased comfort and less sweaty feeling this date. Waiting on deliver of compression garment that was ordered 9/2/2020. Will continue to benefit from skilled PT to maintain decongested limb in anticipation of deliver of garment.  Will need 1-2 more sessions after receipt of garment to assess size and fit. []  See Progress Note/Recertification   Patient will continue to benefit from skilled PT services to modify and progress therapeutic interventions, address functional mobility deficits, address ROM deficits, address strength deficits, analyze and address soft tissue restrictions, analyze and cue movement patterns, analyze and modify body mechanics/ergonomics and assess and modify postural abnormalities to attain remaining goals. Progress toward goals / Updated goals:    1. Pt will be Independent with compression management routine to maintain LE girth measures.  - waiting on garment  Pt will increase strength of bilateral plantar flexors to at least 4+/5 to improve efficiency with push off in gait.        PLAN    [x]  Upgrade activities as tolerated YES Continue plan of care   []  Discharge due to :    []  Other:      Therapist: Dilcia Barrett    Date: 9/9/2020 Time: 11:30 AM     Future Appointments   Date Time Provider Sun Ortiz   9/11/2020  7:30 AM Cathi May [facility]   9[facility]14/2020  7:[facility]   9/23/2020  7:30 AM Nay Wilkes MD [address] [place] [address]

## 2020-09-11 ENCOUNTER — HOSPITAL ENCOUNTER (OUTPATIENT)
Dept: PHYSICAL THERAPY | Age: 65
Discharge: HOME | End: 2020-09-11
Payer: COMMERCIAL

## 2020-09-11 PROCEDURE — 97535 SELF CARE MNGMENT TRAINING: CPT

## 2020-09-11 PROCEDURE — 97140 MANUAL THERAPY 1/> REGIONS: CPT

## 2020-09-11 NOTE — PROGRESS NOTES
PHYSICAL THERAPY - DAILY TREATMENT NOTE    Patient Name: Yaneth Flores        Date: 2020  : 1955   YES Patient  Verified  Visit #:   15   of   18  Insurance: Payor: Tonia Ramírez / Plan: Margie Gan HMO / Product Type: HMO /      In time: 7:30 Out time: 8:28   Total Treatment Time: 58     Medicare/[facility] Time Tracking (below)   Total Timed Codes (min):  58 1:1 Treatment Time:  58     TREATMENT AREA =  Lymphedema, not elsewhere classified [I89.0]    SUBJECTIVE    Pain Level (on 0 to 10 scale):  0  / 10   Medication Changes/New allergies or changes in medical history, any new surgeries or procedures? NO     If yes, update Summary List   Subjective Functional Status/Changes:  []  No changes reported     \"Let me tell you this: I didn't have any pain last night and I could sleep the whole night through. \"          OBJECTIVE    Modalities Rationale: To improve activity tolerance for exercise performance and ADL's.    min [] Estim, type/location:                                      []  att     []  unatt     []  w/US     []  w/ice    []  w/heat    min []  Mechanical Traction: type/lbs                   []  pro   []  sup   []  int   []  cont    []  before manual    []  after manual    min []  Ultrasound, settings/location:      min []  Iontophoresis w/ dexamethasone, location:                                               []  take home patch       []  in clinic   n/a min []  Ice     []  Heat    location/position:     min []  Vasopneumatic Device, press/temp:     min []  Other:    [x] Skin assessment post-treatment (if applicable):   [x]  intact    []  redness- no adverse reaction     []redness - adverse reaction:    45 min Manual Therapy: Removed old wraps. Washed BLE with soap and water and dried with towel. Lubriderm lotion applied to BLE.  Multilayer compression bandaging applied to BLE: tg stockinetter size 9, cellona cast padding 10-cm x 2 to each leg, no toe wraps, 10-cm and 12-cm rosidal K bandage using modified 2-layer technique. Rationale:      decrease pain and decrease edema  to improve patient's ability to perform ADLs and functional mobility with improved ease and comfort    13 min Self-Care: Patient education on garment care and wash instructions, garment wearing instructions, signs and symptoms of adverse effects of bandages prompting removal, causes of lymphedema, how compression manages swelling   Rationale: To promote carryover of lymphedema treatment protocol and increase independence with lymphedema management. min Patient Education:  YES  Reviewed HEP   []  Progressed/Changed HEP based on:   Patient reports compliance     Other Objective/Functional Measures:    No pain in BLE this date. No maceration of left heel with good moisture wicking of cotton padding products  Still pending delivery of compression garement         Post Treatment Pain Level (on 0 to 10) scale:   0  / 10     ASSESSMENT    Assessment/Changes in Function:     Patient presented without pain with wraps intact. Continued bandaging from last session using tG size 9 cotton stockinette, non-tubigrip, to promote comfort and prevent moisture retention. Did not use rosidal K soft foam due to moisture retention. Used two-layer bandaging modified version to BLE. Patient reported increased comfort and less sweaty feeling this date. Waiting on deliver of compression garment that was ordered 9/2/2020. Will continue to benefit from skilled PT to maintain decongested limb in anticipation of deliver of garment. Will need 1-2 more sessions after receipt of garment to assess size and fit.      []  See Progress Note/Recertification   Patient will continue to benefit from skilled PT services to modify and progress therapeutic interventions, address functional mobility deficits, address ROM deficits, address strength deficits, analyze and address soft tissue restrictions, analyze and cue movement patterns, analyze and modify body mechanics/ergonomics and assess and modify postural abnormalities to attain remaining goals. Progress toward goals / Updated goals:    1.  Pt will be Independent with compression management routine to maintain LE girth measures. - waiting on garment  Pt will increase strength of bilateral plantar flexors to at least 4+/5 to improve efficiency with push off in gait.      PLAN    [x]  Upgrade activities as tolerated YES Continue plan of care   []  Discharge due to :    []  Other:      Therapist: Sheba Encinas    Date: 9/11/2020 Time: 10:21 AM     Future Appointments   Date Time Provider Sun Ortiz   9/14/2020  9:30 AM Lina Johns [facility]   9/16/2020  9:30 [facility]   9/23/2020  7:30 AM Kingsley Wilkes MD [facility]

## 2020-09-14 ENCOUNTER — HOSPITAL ENCOUNTER (OUTPATIENT)
Dept: PHYSICAL THERAPY | Age: 65
Discharge: HOME | End: 2020-09-14
Payer: COMMERCIAL

## 2020-09-14 PROCEDURE — 97530 THERAPEUTIC ACTIVITIES: CPT

## 2020-09-14 PROCEDURE — 97140 MANUAL THERAPY 1/> REGIONS: CPT

## 2020-09-14 NOTE — PROGRESS NOTES
PHYSICAL THERAPY - DAILY TREATMENT NOTE    Patient Name: Neda Reason        Date: 2020  : 1955   YES Patient  Verified  Visit #:     Insurance: Payor: Albert Luong / Plan: Dylon Pablo HMO / Product Type: HMO /      In time: 9:37 Out time: 10:27   Total Treatment Time: 50     Medicare/[facility] Time Tracking (below)   Total Timed Codes (min):  n/a 1:1 Treatment Time:  n/a     TREATMENT AREA =  Lymphedema, not elsewhere classified [I89.0]    SUBJECTIVE    Pain Level (on 0 to 10 scale):  0  / 10   Medication Changes/New allergies or changes in medical history, any new surgeries or procedures? NO     If yes, update Summary List   Subjective Functional Status/Changes:  [x]  No changes reported     \"Let me tell you, Let me tell YOU. .. I have slept like a baby since you started wrapping me. \"          OBJECTIVE    Modalities Rationale: To improve activity tolerance for exercise performance and ADL's.    min [] Estim, type/location:                                      []  att     []  unatt     []  w/US     []  w/ice    []  w/heat    min []  Mechanical Traction: type/lbs                   []  pro   []  sup   []  int   []  cont    []  before manual    []  after manual    min []  Ultrasound, settings/location:      min []  Iontophoresis w/ dexamethasone, location:                                               []  take home patch       []  in clinic   n/a min []  Ice     []  Heat    location/position:     min []  Vasopneumatic Device, press/temp:     min []  Other:    [x] Skin assessment post-treatment (if applicable):   [x]  intact    []  redness- no adverse reaction     []redness - adverse reaction:      10 min Therapeutic Activity: Bilateral LE measurements taken to assess for change in girth and limb volume with new wrapping style. Contacted Rahul Michelle with patient present to find out status of pending delivery of compression garment. Rationale:    To assess for progress towards goals and effectiveness of new wrapping. 40 min Manual Therapy: Removed old wraps. Washed BLE with soap and water and dried with towel. Lubriderm lotion applied to BLE. Multilayer compression bandaging applied to BLE: tg stockinetter size 9, cellona cast padding 10-cm x 2 to each leg, no toe wraps, 10-cm and 12-cm rosidal K bandage using modified 2-layer technique. Rationale:      decrease pain and decrease edema  to improve patient's ability to perform ADLs and functional mobility with improved ease and comfort       min Patient Education:  YES  Reviewed HEP   []  Progressed/Changed HEP based on:   Patient reports compliance     Other Objective/Functional Measures:    No pain in BLE this date. No maceration to left heel. Contacted Leeann walker about pending status of compreflex delivery. Nicole Lopez with [employer] walker called back and stated that the shipment was \"in transit\" but should have been delivered by now so she resent another shipment and refunded the cost of shipping to the patient. Girth (cm):  Right LE  9/14/2020 Left LE  9/14/2020   MTP: 23.5 23.9   Midfoot/navicular: 23.3 23.8   Ankle: 24.8 24.3   Calf     (26 cm from ankle) 36.8 38.4   Calf:      ( 31cm from ankle) 43.2 42.5   Knee:    (patella) 46.5 44.5            Post Treatment Pain Level (on 0 to 10) scale:   0  / 10     ASSESSMENT    Assessment/Changes in Function:     Patient presented without pain with wraps intact. Continued bandaging from last session using tG size 9 cotton stockinette to promote comfort and prevent moisture retention. Did not use rosidal K soft foam due to moisture retention. Used two-layer bandaging modified version to BLE. Patient reported no pain since beginning this wrapping sequence. Waiting on delivery of compression garment that was ordered 9/2/2020. Contact Nayeli with [employer] this date and she said that a new order will go out and the patient will be refunded the cost of shipping.  Will continue to benefit from skilled PT to maintain decongested limb in anticipation of deliver of garment. Will need 1-2 more sessions after receipt of garment to assess size and fit. [x]  See Progress Note/Recertification   Patient will continue to benefit from skilled PT services to modify and progress therapeutic interventions, address functional mobility deficits, address ROM deficits, address strength deficits, analyze and address soft tissue restrictions, analyze and cue movement patterns, analyze and modify body mechanics/ergonomics and assess and modify postural abnormalities to attain remaining goals. Progress toward goals / Updated goals:    See re certification.       PLAN    [x]  Upgrade activities as tolerated YES Continue plan of care   []  Discharge due to :    []  Other:      Therapist: Sherrie Emmanuel    Date: 9/14/2020 Time: 10:31 AM     Future Appointments   Date Time Provider Sun Ortiz   9/16/2020  9:30 [facility]   9/23/2020  7:30 AM Antwan Wilkes MD [address]   9/25/2020 10:30 [facility]

## 2020-09-14 NOTE — PROGRESS NOTES
[facility]  [address]  [place], [address]  [phone] fax    Continued Plan of Care/ Re-certification for Physical Therapy Services    Patient name: Domitila Olvera Start of Care: 2020   Referral source: Eric Arriola NP : 1955   Medical/Treatment Diagnosis: Lymphedema, not elsewhere classified [I89.0]  Payor: [employer] / Plan: [address] [place] HMO / Product Type: HMO /  Onset Date:chronic for 1 year     Prior Hospitalization: see medical history Provider#: [id number]   Medications: Verified on Patient Summary List    Comorbidities: HTN  Prior Level of Function:independent without limitations  Visits from Start of Care: 15    Missed Visits: 0    The Plan of Care and following information is based on the patient's current status:  Goal: 1. Pt will be Independent with compression management routine to maintain LE girth measures. Status at last note/certification: n/a  Current Status: not met  - pending delivery of garments    Goal: Pt will increase strength of bilateral plantar flexors to at least 4+/5 to improve efficiency with push off in gait. Status at last note/certification: 4/5  Current Status: not met      Key functional changes: Therapy has consisted of complete decongestive therapy protocol including multilayer compression bandaging, manual lymph drainage, patient education on garment wear, care, and donning/doffing, patient education on skin care routine, and garment fitting. Patient continues to have swelling in BLE. See girth chart below for progress towards goals. A compression garment has been ordered and is pending delivery. Status update received this date indicated that the garment was \"still in transit\" but [employer] is in the process of sending out another garment as the first one hasn't been received. Since starting a new bandaging method last week, patient has been pain free in BLE.        Girth (cm):  Right LE  8/7/2020 Left   8/7/2020 Right LE  8/31/2020 Left LE 8/31/2020   MTP: 23 24 22 22   Midfoot/navicular: 25 25 23.5 25   Ankle: 27.5 28.5 25 25   Calf:     (26 cm from malleolus) 39.5 40 41 37.5   Knee:    (2 cm from popliteus) 39 41.5 42 38.5   Thigh     (8 cm fron knee) 47.5 46.5 46 46      Right   9/14/2020 Left   9/14/2020   MTP: 23.5 23.9   Midfoot/navicular: 23.3 23.8   Ankle: 24.8 24.3   Calf     (26 cm from ankle) 36.8 38.4   Calf:      ( 31cm from ankle) 43.2 42.5   Knee:    (patella) 46.5 44.5         Problems/ barriers to goal attainment: waiting on delayed delivery of garment     Problem List: decrease ROM, decrease strength and edema affecting function    Treatment Plan: Therapeutic exercise, Therapeutic activities, Neuromuscular re-education, Physical agent/modality, Gait/balance training, Manual therapy, Patient education, Self Care training, Functional mobility training, Home safety training, Stair training and Other: complete decongestive therapy     Patient Goal (s) has been updated and includes: \"I just want this garment. \"     Goals for this certification period to be accomplished in 4 weeks:  1. Pt will be Independent with compression management routine to maintain LE girth measures. Frequency / Duration: Patient to be seen 2-3 times per week for 4 weeks:    Assessment / Recommendations: The patient would continue to benefit from skilled interventions to address the above mentioned functional deficits. See above for more details. Certification Period: 09/14/20 - 10/13/2020      [facility] 9/14/2020 10:32 AM    ________________________________________________________________________  I certify that the above Therapy Services are being furnished while the patient is under my care. I agree with the treatment plan and certify that this therapy is necessary. [] I have read the above and request that my patient continue as recommended.   [] I have read the above report and request that my patient continue therapy with the following changes/special instructions: _____________________________________________  [] I have read the above report and request that my patient be discharged from therapy    Physician's Signature:____________Date:_________TIME:________    ** Signature, Date and Time must be completed for valid certification **    Please sign and return to [facility] [address] [place] [address] & [facility]  [address] [place] [address], [employer] [address]  [phone] fax

## 2020-09-16 ENCOUNTER — HOSPITAL ENCOUNTER (OUTPATIENT)
Dept: PHYSICAL THERAPY | Age: 65
Discharge: HOME | End: 2020-09-16
Payer: COMMERCIAL

## 2020-09-16 PROCEDURE — 97535 SELF CARE MNGMENT TRAINING: CPT

## 2020-09-16 PROCEDURE — 97140 MANUAL THERAPY 1/> REGIONS: CPT

## 2020-09-16 NOTE — PROGRESS NOTES
PHYSICAL THERAPY - DAILY TREATMENT NOTE    Patient Name: Alphonse Hilton        Date: 2020  : 1955   YES Patient  Verified  Visit #:     Insurance: Payor: Christopher Batistaist / Plan: Kenyatta Danielson HMO / Product Type: HMO /      In time: 9:45 Out time: 10:45   Total Treatment Time: 60     Medicare/[facility] Time Tracking (below)   Total Timed Codes (min):  n/a 1:1 Treatment Time:  n/a     TREATMENT AREA =  Lymphedema, not elsewhere classified [I89.0]    SUBJECTIVE    Pain Level (on 0 to 10 scale):  0  / 10   Medication Changes/New allergies or changes in medical history, any new surgeries or procedures? NO     If yes, update Summary List   Subjective Functional Status/Changes:  [x]  No changes reported     \"Listen, I don't know why this garment is taking so long. What do you think could have happened? \"          OBJECTIVE    Modalities Rationale: To improve activity tolerance for exercise performance and ADL's.    min [] Estim, type/location:                                      []  att     []  unatt     [x]  w/US     []  w/ice    []  w/heat    min []  Mechanical Traction: type/lbs                   []  pro   []  sup   []  int   []  cont    []  before manual    []  after manual    min []  Ultrasound, settings/location:      min []  Iontophoresis w/ dexamethasone, location:                                               []  take home patch       []  in clinic   n/a min []  Ice     []  Heat    location/position:     min []  Vasopneumatic Device, press/temp:     min []  Other:    [x] Skin assessment post-treatment (if applicable):   [x]  intact    []  redness- no adverse reaction   Darkened skin, but not redness,over left anterior tibialis tendon. Added extra cotton padding today.   []redness - adverse reaction:      45 min Manual Therapy: Removed old wraps. Washed BLE with soap and water and dried with towel. Lubriderm lotion applied to BLE.  Multilayer compression bandaging applied to BLE: tg stockinetter size 9, cellona cast padding 10-cm x 2 to each leg, no toe wraps, 10-cm and 12-cm rosidal K bandage using modified 2-layer technique. Rationale:      decrease pain and decrease edema  to improve patient's ability to perform ADLs and functional mobility with improved ease and comfort    15 min Self-Care: Provided patient with tubigrip size E x 4 sleeves due to scheduling issues and her bandages will not last until next session. Cut tubigrip to proper length from metatarsal heads to just under the popliteal fossa. Provided extensive verbal and written instructions for patient for wearing schedule, when to take off bandages, how to put on tubigrip. Rationale: To promote carryover of lymphedema treatment protocol and increase independence with lymphedema management. min Patient Education:  YES  Reviewed HEP   []  Progressed/Changed HEP based on:   Patient reports compliance     Other Objective/Functional Measures:    Endorsed tenderness to right calcaneous. Experienced two episodes of right achilles tendon pain when dorsiflexing during bandaging. Pain in right calcaneous and right achilles tendon appear consistent with tendonitis. Patient educated on rest, ice, and to try to wear more supportive shoes as patient wears soft slippers to work and is on her feet all day as a CNA. Still waiting on garment re-delivery from [employer]. Post Treatment Pain Level (on 0 to 10) scale:   0  / 10     ASSESSMENT    Assessment/Changes in Function:   Patient presented without pain with wraps intact. Continued bandaging from last session using tG size 9 cotton stockinette to promote comfort and prevent moisture retention. Used two-layer bandaging modified version to BLE. Patient reported intermittent right calcaneal pain and right achilles tendon pain when not in wraps and when dorsiflexing foot during bandaging today; signs and symptoms consisted with suspected tendonitis.  Waiting on delivery of compression garment that was ordered 9/2/2020 and re-ordered 9/14/2020. Will continue to benefit from skilled PT to maintain decongested limb in anticipation of deliver of garment. Will need 1-2 more sessions after receipt of garment to assess size and fit. []  See Progress Note/Recertification   Patient will continue to benefit from skilled PT services to modify and progress therapeutic interventions, address functional mobility deficits, address ROM deficits, address strength deficits, analyze and address soft tissue restrictions, analyze and cue movement patterns, analyze and modify body mechanics/ergonomics and assess and modify postural abnormalities to attain remaining goals. Progress toward goals / Updated goals:    1.  Pt will be Independent with compression management routine to maintain LE girth measures.      PLAN    [x]  Upgrade activities as tolerated YES Continue plan of care   []  Discharge due to :    []  Other:      Therapist: Americo Roldan    Date: 9/16/2020 Time: 10:48 AM     Future Appointments   Date Time Provider Sun Ortiz   9/23/2020  7:30 AM Georgie Laboy MD [facility]   9/25/2020 10:30 AM Jose Maria Luisa [facility]   9/28/2020 10:30 AM Jose Maria Luisa [facility]   9/30/2020 10:30 AM Jose Maria Luisa [facility]

## 2020-09-23 ENCOUNTER — HOSPITAL ENCOUNTER (OUTPATIENT)
Dept: PHYSICAL THERAPY | Age: 65
Discharge: HOME | End: 2020-09-23
Payer: COMMERCIAL

## 2020-09-23 ENCOUNTER — OFFICE VISIT (OUTPATIENT)
Dept: ORTHOPEDIC SURGERY | Age: 65
End: 2020-09-23
Payer: COMMERCIAL

## 2020-09-23 VITALS
DIASTOLIC BLOOD PRESSURE: 87 MMHG | TEMPERATURE: 96.9 F | HEIGHT: 66 IN | BODY MASS INDEX: 33.62 KG/M2 | OXYGEN SATURATION: 96 % | SYSTOLIC BLOOD PRESSURE: 144 MMHG | HEART RATE: 84 BPM | WEIGHT: 209.2 LBS

## 2020-09-23 DIAGNOSIS — M25.572 ACUTE LEFT ANKLE PAIN: ICD-10-CM

## 2020-09-23 DIAGNOSIS — M76.61 ACHILLES TENDINITIS OF BOTH LOWER EXTREMITIES: Primary | ICD-10-CM

## 2020-09-23 DIAGNOSIS — M25.571 ACUTE RIGHT ANKLE PAIN: ICD-10-CM

## 2020-09-23 DIAGNOSIS — I89.0 LYMPHEDEMA OF BOTH LOWER EXTREMITIES: ICD-10-CM

## 2020-09-23 DIAGNOSIS — M76.62 ACHILLES TENDINITIS OF BOTH LOWER EXTREMITIES: Primary | ICD-10-CM

## 2020-09-23 PROCEDURE — 97535 SELF CARE MNGMENT TRAINING: CPT

## 2020-09-23 PROCEDURE — 73610 X-RAY EXAM OF ANKLE: CPT | Performed by: ORTHOPAEDIC SURGERY

## 2020-09-23 PROCEDURE — 97140 MANUAL THERAPY 1/> REGIONS: CPT

## 2020-09-23 PROCEDURE — 99204 OFFICE O/P NEW MOD 45 MIN: CPT | Performed by: ORTHOPAEDIC SURGERY

## 2020-09-23 RX ORDER — MELOXICAM 15 MG/1
15 TABLET ORAL
Qty: 30 TAB | Refills: 0 | Status: CANCELLED | OUTPATIENT
Start: 2020-09-23

## 2020-09-23 RX ORDER — FAMOTIDINE 40 MG/1
40 TABLET, FILM COATED ORAL DAILY
Qty: 30 TAB | Refills: 0 | Status: SHIPPED | OUTPATIENT
Start: 2020-09-23 | End: 2021-01-04 | Stop reason: ALTCHOICE

## 2020-09-23 NOTE — PROGRESS NOTES
AMBULATORY PROGRESS NOTE      Patient: Roxanna Randle             MRN: [id number]     SSN: [id number] Body mass index is 33.77 kg/m². YOB: 1955     AGE: 59 y.o. EX: female    PCP: Yeison Sanchez NP       IMPRESSION //  DIAGNOSIS AND TREATMENT PLAN      DIAGNOSES  1. Achilles tendinitis of both lower extremities    2. Lymphedema of both lower extremities    3. Acute left ankle pain    4. Acute right ankle pain        Orders Placed This Encounter    AMB POC XRAY, ANKLE; COMPLETE, 3+ VIE     Order Specific Question:   Reason for Exam     Answer:   PAIN    AMB POC XRAY, ANKLE; COMPLETE, 3+ VIE     ASK ALL FEMALE PATIENTS IF THEY ARE PREGNANT     Order Specific Question:   Reason for Exam     Answer:   PAIN    REFERRAL TO PHYSICAL THERAPY     Referral Priority:   Routine     Referral Type:   PT/OT/ST     Referral Reason:   Specialty Services Required     Requested Specialty:   Physical Therapy     Number of Visits Requested:   1    famotidine (PEPCID) 40 mg tablet     Sig: Take 1 Tab by mouth daily. I am prescribing this medication for protective measures while taking the NSAID. Dispense:  30 Tab     Refill:  0      She has a profound right greater than left lower extremity lymphedema for which he is receiving lymphedema treatments. She has bilateral insertional, calcific Achilles tendinitis, symptomatic much more on the right and left. X-rays of her right ankle, today 3 views, shows that the ankle joints well-maintained no fracture no subluxation of dislocation. There is a large bone spur at insertion point of the right Achilles tendon. Three-view left ankle, today: Large calcific bone spur seen at the insertion point of the left Achilles tendon.,  Otherwise left ankle joints well-maintained, without any fractures of his dislocation. No osteolytic or osteoblastic's are seen on this three-view left left ankle.     Of note, on each ankle, there is profound soft tissue swelling, consistent with lymphedema. PLAN:    1. Mobic 15 m PO every day; 30 tablets, 0 refills. 2. Pepcid 40 m PO every day; 30 tablets, 0 refills. 3. Referral to PT for Graston Technique and Low Frequency ultrasound  4. Work Note: Please allow Ms. Dorcus Bloch to work sedentary positions at this time due to severe bilateral Achilles tendinitis and profound lymphedema. RTO-      HPI //  OBJECTIVE EXAMINATION      Blanca IS A 59 y.o. female who presents to my outpatient office for evaluation of: bilateral ankle pain and swelling (R>L). The patient has h/o of lymphedema since 2019. She is currently receiving lymphedema treatment at PT as recommended by her PCP. On a typical day, the patient reports immediate start-up pain. She reports severe pain when going up ramps. 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 Visit Vitals  BP (!) 144/87 (BP 1 Location: Right arm, BP Patient Position: Sitting)   Pulse 84   Temp 96.9 °F (36.1 °C) (Temporal)   Ht 5' 6\" (1.676 m)   Wt 209 lb 3.2 oz (94.9 kg)   SpO2 96%   BMI 33.77 kg/m²       ANKLE/FOOT bilateral      Psychiatry: Alert, oriented x 3 (name,place,time of day); speech normal in context and clarity, memory intact grossly, no involuntary movements - tremors, no dementia  Gait: slow  Tenderness: moderate to distal Achilles   Cutaneous: profound circumferential pitting edema middle leg to ankle and top of foot (R>L)  Joint Motion: WNL  Joint / Tendon Stability: No Ankle or Subtalar instability or joint laxity. No peroneal sublux ability or dislocation  Alignment: neutral Hindfoot, none Metatarsus Adductus Metatarsus. Neuro Motor/Sensory: NL/NL,  Vascular: NL foot/ankle pulses,   Lymphatics: lymphedema since 2019 (more swelling on Right than Left)          CHART REVIEW     There is no problem list on file for this patient.        Blanca has been experiencing pain and discomfort confirmed as outlined in the pain assessment outlined below. Pain Assessment  9/23/2020   Location of Pain Ankle   Location Modifiers Right;Left   Severity of Pain 8   Quality of Pain Throbbing;Aching;Burning   Duration of Pain Persistent   Frequency of Pain Constant   Date Pain First Started 5/23/2020   Aggravating Factors Standing   Limiting Behavior Some   Relieving Factors (No Data)   Relieving Factors Comment cream, pain meds   Result of Injury No        Rachel Altman  has a past medical history of Essential hypertension, benign. Patients is employed at:         Past Medical History:   Diagnosis Date    Essential hypertension, benign      History reviewed. No pertinent surgical history. Current Outpatient Medications   Medication Sig    famotidine (PEPCID) 40 mg tablet Take 1 Tab by mouth daily. I am prescribing this medication for protective measures while taking the NSAID.  triamcinolone (ARISTOCORT) 0.5 % topical cream Apply  to affected area two (2) times a day. use thin layer    triamterene-hydroCHLOROthiazide (MAXZIDE) 37.5-25 mg per tablet Take 1 Tab by mouth daily.  predniSONE (STERAPRED DS) 10 mg dose pack See administration instruction per 10mg dose pack    diclofenac (VOLTAREN) 1 % gel Apply  to affected area four (4) times daily.  ibuprofen (MOTRIN) 800 mg tablet Take 1 Tab by mouth every eight (8) hours as needed for Pain (with food). No current facility-administered medications for this visit. No Known Allergies  Social History     Occupational History    Not on file   Tobacco Use    Smoking status: Never Smoker    Smokeless tobacco: Never Used   Substance and Sexual Activity    Alcohol use: No    Drug use: Never    Sexual activity: Not on file     Family History   Problem Relation Age of Onset    Hypertension Other        THE  FOR Rachel Altman  WAS REVIEWED BY Jenna Calle MD 9/23/2020 .       DIAGNOSTIC IMAGING  LAB DATA      Lab Results   Component Value Date/Time    Hemoglobin A1c 6.3 (H) 01/09/2020 10:53 AM    Hemoglobin A1c 6.5 (H) 07/16/2019 12:35 PM    //   Lab Results   Component Value Date/Time    Glucose 96 06/15/2020 11:12 AM        Lab Results   Component Value Date/Time    Hemoglobin A1c (POC) 5.5 10/10/2019 11:21 PM         No results found for: Danette Fancher, XQVID3, XQVID, VD3RIA, [id number]      REVIEW OF SYSTEMS : 9/23/2020  ALL BELOW ARE Negative except : SEE HPI     CONSTITUTIONAL: No weight loss  PSYCHOLOGICAL : No Feelings of anxiety, depression, agitation  EYES: No blurred vision and no eye discharge. NO eye pain, double vision  ENT: No nasal discharge. No ear pain. CARDIOVASCULAR: No chest pain and no diaphoresis. RESPIRATORY: No cough, no hemoptysis. GI: No vomiting, no diarrhea   : No urinary frequency and no dysuria. MUSCULOSKELETAL: see HPI  SKIN: No rashes. NEURO:  No dizziness,weakness, headaches// No visual changes or confusion, or seizures,   ENDOCRINE: No polyphagia and no polydipsia. HEMATOLOGY: No bleeding tendencies. DIAGNOSTIC IMAGING        X RAYS/IMAGES DONE AT [address] [place] [address] 9/23/2020      X-rays of her right ankle, today 3 views, shows that the ankle joints well-maintained no fracture no subluxation of dislocation. There is a large bone spur at insertion point of the right Achilles tendon. Three-view left ankle, today: Large calcific bone spur seen at the insertion point of the left Achilles tendon.,  Otherwise left ankle joints well-maintained, without any fractures of his dislocation. No osteolytic or osteoblastic's are seen on this three-view left left ankle. Please see above section of this report. I have personally reviewed the results of the above study. The interpretation of this study is my professional opinion.       Jenna Calle MD  9/23/2020  7:27 AM

## 2020-09-23 NOTE — PROGRESS NOTES
PHYSICAL THERAPY - DAILY TREATMENT NOTE    Patient Name: Aimee Vila        Date: 2020  : 1955   YES Patient  Verified  Visit #:   3   of   12  Insurance: Payor: Dell Jesica / Plan: [address] [place] HMO / Product Type: HMO /      In time: 10:00 Out time: 10:31   Total Treatment Time: 31     Medicare/BCBS Time Tracking (below)   Total Timed Codes (min):  n/a 1:1 Treatment Time:  n/a     TREATMENT AREA =  Lymphedema, not elsewhere classified [I89.0]    SUBJECTIVE    Pain Level (on 0 to 10 scale):  9  / 10   Medication Changes/New allergies or changes in medical history, any new surgeries or procedures? NO     If yes, update Summary List   Subjective Functional Status/Changes:  []  No changes reported     \"I am in so much pain. And I'm so swollen. I went to get an x-ray today after work and they say I have tendinitis. \"   Patient arrived for 9:30 appointment, then left without telling .  called patient and she stated, \"I'm in too much pain in my legs. I'll do what I can at home. \" PT called patient back to check on patient and patient agreed to come see PT due to concern for leg swelling. OBJECTIVE    Modalities Rationale:  To improve activity tolerance for exercise performance and ADL's.    min [] Estim, type/location:                                      []  att     []  unatt     []  w/US     []  w/ice    []  w/heat    min []  Mechanical Traction: type/lbs                   []  pro   []  sup   []  int   []  cont    []  before manual    []  after manual    min []  Ultrasound, settings/location:      min []  Iontophoresis w/ dexamethasone, location:                                               []  take home patch       []  in clinic   n/a min []  Ice     []  Heat    location/position:     min []  Vasopneumatic Device, press/temp:     min []  Other:    [x] Skin assessment post-treatment (if applicable):   [x]  intact    []  redness- no adverse reaction     []redness - adverse reaction:        16 min Manual Therapy: Lubriderm lotion applied to BLE. Multilayer compression bandaging applied to LLE: tg stockinetter size 9, cellona cast padding 10-cm x 2 to each leg, no toe wraps, 10-cm and 12-cm rosidal K bandage using modified 2-layer technique. Patient did not bring good shoes to fit into with bandages so bandages had to be removed. Provided patient with tg soft, no , to BLE for pain management and some level of compression. Instructed patient to elevate legs at home and to return Friday with bandages and good shoes for continuing bandaging. Rationale:      decrease pain and decrease edema  to improve patient's ability to perform ADLs and functional mobility with improved ease and comfort    15 min Self-Care: Patient education on signs/symptoms indicating that compression bandages/tg  needs to be removed. Patient verbalized understanding. Patient was compliant with precautions and took off bandages and stocking at home. Patient education on donning/doffing compression garments which arrived but socks are too small/her legs are too swollen for socks to fit. Rationale: To promote carryover of lymphedema treatment protocol and increase independence with lymphedema management. min Patient Education:  YES  Reviewed HEP   []  Progressed/Changed HEP based on:   Patient reports compliance     Other Objective/Functional Measures:    Did not have time for girth measurements as patient was 30 minutes late but BLE noticeably and significantly swollen. No redness. Pitting edema present bilaterally, especially ankles. Endorsed 9/10 pain and tenderness to palpation to right achilles tendon. Attempted to candis BLE compression stockings from melvin walker (30-40 mmHg size large, knee high) but patient's legs were too swollen. Used donning aide (gant) but without success. Patient needs continued bandaging to control swelling prior to trying compression socks.          Post Treatment Pain Level (on 0 to 10) scale:   5  / 10     ASSESSMENT    Assessment/Changes in Function:     Patient presented without wraps, in 9/10 pain in right achilles tendon. Just had an x-ray this AM and Dr. Ian Rivera diagnoses right achilles tendinitis. Patient presented with significant increase in swelling to bilateral LE. Patient needed compression this date but did not bring bandages or proper shoes. PT attempted to bandage LLE with loaner bandages but patient was unable to walk in her sandals and could not ambulate barefoot to car, so bandages were removed. Provided tg soft stockinette this date for mild compression, not to same degree at tg . Patient educated on need to control LE swelling and lymphedema prior to having compression socks fit. Leeann walker socks were delivered to patient's house (30-40 mmHg size large knee highs) but were too small today due to increased BLE swelling. Will continue to benefit from skilled PT to maintain decongested limb in anticipation of deliver of garment. Per last recertification on 7/41/4702 will benefit from 4 more weeks of therapy to control swelling and find garment solution. []  See Progress Note/Recertification   Patient will continue to benefit from skilled PT services to modify and progress therapeutic interventions, address functional mobility deficits, address ROM deficits, address strength deficits, analyze and address soft tissue restrictions, analyze and cue movement patterns, analyze and modify body mechanics/ergonomics and assess and modify postural abnormalities to attain remaining goals. Progress toward goals / Updated goals:    1.  Pt will be Independent with compression management routine to maintain LE girth measures.   - not met, ongoing     PLAN    [x]  Upgrade activities as tolerated YES Continue plan of care   []  Discharge due to :    []  Other:      Therapist: Ildefonso Marley    Date: 9/23/2020 Time: 10:56 AM     Future Appointments   Date Time Provider Department Center   9/25/2020 10:30 AM Marvene Iola [facility]   9/28/2020 10:30 AM Marvene Iola [facility]   9/30/2020 10:30 AM Marvene Iola [facility]

## 2020-09-23 NOTE — LETTER
NOTIFICATION RETURN TO WORK / SCHOOL 
 
9/23/2020 8:45 AM 
 
Ms. Kylah Cha[address]z [address] [address] [address] [address] To Whom It May Concern: 
 
Kylah Chaidez is currently under the care of [address] [place] [address] Luis Alberto Brock. Please allow Ms. Anna Krueger to work sedentary positions at this time due to severe bilateral Achilles tendinitis and profound lymphedema. If there are questions or concerns please have the patient contact our office.  
 
 
 
Sincerely, 
 
 
Ramona Santiago MD

## 2020-09-25 ENCOUNTER — TELEPHONE (OUTPATIENT)
Dept: ORTHOPEDIC SURGERY | Age: 65
End: 2020-09-25

## 2020-09-25 ENCOUNTER — HOSPITAL ENCOUNTER (OUTPATIENT)
Dept: PHYSICAL THERAPY | Age: 65
Discharge: HOME | End: 2020-09-25
Payer: COMMERCIAL

## 2020-09-25 PROCEDURE — 97535 SELF CARE MNGMENT TRAINING: CPT

## 2020-09-25 PROCEDURE — 97530 THERAPEUTIC ACTIVITIES: CPT

## 2020-09-25 PROCEDURE — 97140 MANUAL THERAPY 1/> REGIONS: CPT

## 2020-09-25 NOTE — PROGRESS NOTES
PHYSICAL THERAPY - DAILY TREATMENT NOTE    Patient Name: Lisa Christensen        Date: 2020  : 1955   YES Patient  Verified  Visit #:     Insurance: Payor: Kingsley Soto / Plan: Maida Goyal HMO / Product Type: HMO /      In time: 10:14 Out time: 11:29   Total Treatment Time: 75     Medicare/BCBS Time Tracking (below)   Total Timed Codes (min):  n/a 1:1 Treatment Time:  n/a     TREATMENT AREA =  Lymphedema, not elsewhere classified [I89.0]    SUBJECTIVE    Pain Level (on 0 to 10 scale):  10  / 10   Medication Changes/New allergies or changes in medical history, any new surgeries or procedures? NO     If yes, update Summary List   Subjective Functional Status/Changes:  []  No changes reported     \"I am hurting today. It's my right heel and my left heel a little bit. \"          OBJECTIVE    Modalities Rationale: To improve activity tolerance for exercise performance and ADL's.    min [] Estim, type/location:                                      []  att     []  unatt     []  w/US     []  w/ice    []  w/heat    min []  Mechanical Traction: type/lbs                   []  pro   []  sup   []  int   []  cont    []  before manual    []  after manual    min []  Ultrasound, settings/location:      min []  Iontophoresis w/ dexamethasone, location:                                               []  take home patch       []  in clinic   8 min [x]  Ice     []  Heat    location/position: Right heel, 8 minutes, patient reclined on plinth with knees elevated on cushion, cervical ice pack wrapped around right achilles tendon    min []  Vasopneumatic Device, press/temp:     min []  Other:    [x] Skin assessment post-treatment (if applicable):   [x]  intact    []  redness- no adverse reaction     []redness - adverse reaction:      25 min Therapeutic Activity: Measurements taken of BLE girth to assess for changes in swelling and limb volume.  Compression garment fitting today with total assist by PT for donning/doffing socks. Assessed fit of socks. Patient education on appropriate fit, avoiding wrinkles and rolling. Rationale: To assess progress toward goals. To assess fit of compression garments, to assess ease of donning compression garments, to educated patient on appropriate fit of garments. 15 min Manual Therapy: Lotion applied to both legs. Multilayer compression bandages applied to BLE: tg stockinetter size 9, cellona cast padding 10-cm x 2 to each leg, no toe wraps, rosidal k bandages 8-cm x 2 and 12-cm x 1 to each leg using modified 3-layer technique. Rationale:      decrease pain and decrease edema  to improve patient's ability to perform ADLs and functional mobility with improved ease and comfort    27 min Self-Care: Patient education on ice to reduce tenonitis pain at home. Pt education with written instructions on bandage wearing schedule, when to take off bandages, how to candis/doff compression sock, techniques for avoiding puncturing sock, compression sock wearing schedule, and compression sock wash instructions. Rationale: To promote carryover of lymphedema treatment protocol and increase independence with lymphedema management. min Patient Education:  YES  Reviewed HEP   []  Progressed/Changed HEP based on:   Patient reports compliance     Other Objective/Functional Measures:    Patient continues with RLE achilles tendonitis. Educated on ice at home and wearing supportive shoes instead of slippers to promote healing of tendonitis. Therapist able to [employer] compression stockings this date due to reduced swelling.  Total assist by therapist.     Ora Kearney (cm):  Right LE  9/14/2020 Left LE  9/14/2020 RLE  9/25/20 LLE  9/25/20   MTP: 23.5 23.9 23.1 23.8   Midfoot/navicular: 23.3 23.8 23.5 24.1   Ankle: 24.8 24.3 28.3 28.1   Calf     (26 cm from ankle) 36.8 38.4 39.2 36.7   Calf:      ( 31cm from ankle) 43.2 42.5 40.5 41.2   Knee:    (patella) 46.5 44.5       Ankles significantly more swollen this date but still fit a size large Leeann walker stockings 30-40 mmHg. Bandaged today to reduce ankles but plan is to make sure patient is able to candis/doff socks and continue with the purchased garment as patient has already paid for it. Patient reported significantly reduced pain in right ankle after session       Post Treatment Pain Level (on 0 to 10) scale:   1  / 10     ASSESSMENT    Assessment/Changes in Function:     Patient continues with pain in right achilles tendon from tendonitis. In the process of requesting more visits to complete lymphedema therapy and transition to treating achilles tendonitis per new MD order. Patient would benefit from completing lymphedema therapy to control swelling to reduce pain in ankle in order to promote therapy for right achilles tendonitis. Patient felt significantly better with bandaging today with decreased right ankle pain. Proceeded with bandaging today despite getting compression socks to fit in order to reduce ankle edema as evidenced by chart about. Will continue to benefit from skilled PT to continue to decongest limbs in order to prepare for therapy for achilles tendonitis and to fit better into [employer] Walker stockings, size large, 30-40 mmHg. Per last recertification on 3/69/2884 will benefit from 4 more weeks of therapy to control swelling and find garment solution. []  See Progress Note/Recertification   Patient will continue to benefit from skilled PT services to modify and progress therapeutic interventions, address functional mobility deficits, address ROM deficits, address strength deficits, analyze and address soft tissue restrictions, analyze and cue movement patterns, analyze and modify body mechanics/ergonomics and assess and modify postural abnormalities to attain remaining goals. Progress toward goals / Updated goals:    1.  Pt will be Independent with compression management routine to maintain LE girth measures.   - initiated with compression socks today     PLAN    [x]  Upgrade activities as tolerated YES Continue plan of care   []  Discharge due to :    []  Other:      Therapist: Eric Noyola    Date: 9/25/2020 Time: 1:01 PM     Future Appointments   Date Time Provider Sun Ortiz   9/30/2020 10:30 AM Moose Sesay [id number]

## 2020-09-28 ENCOUNTER — APPOINTMENT (OUTPATIENT)
Dept: PHYSICAL THERAPY | Age: 65
End: 2020-09-28
Payer: COMMERCIAL

## 2020-09-30 ENCOUNTER — HOSPITAL ENCOUNTER (OUTPATIENT)
Dept: PHYSICAL THERAPY | Age: 65
Discharge: HOME | End: 2020-09-30
Payer: COMMERCIAL

## 2020-09-30 PROCEDURE — 97535 SELF CARE MNGMENT TRAINING: CPT

## 2020-09-30 PROCEDURE — 97140 MANUAL THERAPY 1/> REGIONS: CPT

## 2020-09-30 NOTE — PROGRESS NOTES
PHYSICAL THERAPY - DAILY TREATMENT NOTE    Patient Name: Hillary Matute        Date: 2020  : 1955   YES Patient  Verified  Visit #:     Insurance: Payor: Ibis Bennett / Plan: [employer] HMO / Product Type: HMO /      In time: 10:37 Out time: 11:25   Total Treatment Time: 48     Medicare/[facility] Time Tracking (below)   Total Timed Codes (min):  n/a 1:1 Treatment Time:  n/a     TREATMENT AREA =  Lymphedema, not elsewhere classified [I89.0]    SUBJECTIVE    Pain Level (on 0 to 10 scale):  10  / 10   Medication Changes/New allergies or changes in medical history, any new surgeries or procedures? NO     If yes, update Summary List   Subjective Functional Status/Changes:  []  No changes reported     \"Let me tell you. I had to take the bandages off on Friday and I went to work without bandages. But my feet were hurting so bad I tried to call in to get off work. I didn't even try to wear the compression socks. I didn't do what you instructed. \"          OBJECTIVE    Modalities Rationale:  To improve activity tolerance for exercise performance and ADL's.    min [] Estim, type/location:                                      []  att     []  unatt     []  w/US     []  w/ice    []  w/heat    min []  Mechanical Traction: type/lbs                   []  pro   []  sup   []  int   []  cont    []  before manual    []  after manual    min []  Ultrasound, settings/location:      min []  Iontophoresis w/ dexamethasone, location:                                               []  take home patch       []  in clinic   n/a min []  Ice     []  Heat    location/position:     min []  Vasopneumatic Device, press/temp:     min []  Other:    [x] Skin assessment post-treatment (if applicable):   [x]  intact    []  redness- no adverse reaction     []redness - adverse reaction:      24 min Manual Therapy: Multilayer compression bandages applied to BLE: tg stockinetter size 9, cellona cast padding 10-cm x 2 to each leg, no toe wraps, rosidal k bandages 8-cm, 10-cm  and 12-cm x 1 to each leg using modified 3-layer technique. Rationale:      decrease pain and decrease edema  to improve patient's ability to perform ADLs and functional mobility with improved ease and comfort    24 min Self-Care: Patient education on ice to reduce tenonitis pain at home. Pt education on importance of maintaining compression to reduce foot pain, bandage wearing schedule, when to take off bandages. Patient education on importance of compliance with therapy, with compression, with bringing in wraps and washing wraps at home. Rationale: To promote carryover of lymphedema treatment protocol and increase independence with lymphedema management. min Patient Education:  YES  Reviewed HEP   []  Progressed/Changed HEP based on:   Patient reports compliance     Other Objective/Functional Measures:    Patient presented to therapy after being about of wraps for 5+ days. Presented with BLE foot and leg swelling and foot pain. Repeated patient education on importance of compliance with compression bandaging at home, wearing bandages to reduce pain and swelling, and importance of maintaining LE decongestion in order for compression socks to fit. Patient given 8 -cm x 2, 10-cm x 2, and 12-cm x 2 wraps this date. Educated on importance of coming to therapy with her bandages to re-use during session because she has been loaned multiple sets through therapy. Post Treatment Pain Level (on 0 to 10) scale:   5  / 10     ASSESSMENT    Assessment/Changes in Function:     Patient continues with pain in right achilles tendon from tendonitis. Patient continues with difficulties with compliance with bandaging therapy in order to reduce swelling and reduce foot pain. Patient would benefit from completing lymphedema therapy to control swelling to reduce pain in ankle in order to promote therapy for right achilles tendonitis.  Patient felt significantly better with bandaging today with decreased right ankle pain. Will continue to benefit from skilled PT to continue to decongest limbs in order to prepare for therapy for achilles tendonitis and to fit better into [place] Walker stockings, size large, 30-40 mmHg. Per last recertification on 9/53/0899 will benefit from 4 more weeks of therapy to control swelling and find garment solution.      []  See Progress Note/Recertification   Patient will continue to benefit from skilled PT services to modify and progress therapeutic interventions, address functional mobility deficits, address ROM deficits, address strength deficits, analyze and address soft tissue restrictions, analyze and cue movement patterns, analyze and modify body mechanics/ergonomics and assess and modify postural abnormalities to attain remaining goals.    Progress toward goals / Updated goals:    1.  Pt will be Independent with compression management routine to maintain LE girth measures.  -ongoing     PLAN    [x]  Upgrade activities as tolerated YES Continue plan of care   []  Discharge due to :    []  Other:      Therapist: Mick Nascimento    Date: 9/30/2020 Time: 1:12 PM     Future Appointments   Date Time Provider Sun Ortiz   10/2/2020  8:30 AM Jenean Both [facility]   10/5/2020  1:30 PM Oris Sails [facility]   10/7/2020 12:30 PM Oris Sails [facility]   10/9/2020 10:00 AM Oris Sails [facility]   10/12/2020 10:00 AM Oris Sails [facility]   10/14/2020 10:00 AM Oris Sails [facility]   10/16/2020 10:00 AM Oris Sails [facility]   10/19/2020 10:00 AM Oris Sails [facility]   10/21/2020 10:00 AM Oris Sails [facility]   10/23/2020  1:30 PM Oris Sails [facility]

## 2020-10-02 ENCOUNTER — HOSPITAL ENCOUNTER (OUTPATIENT)
Dept: PHYSICAL THERAPY | Age: 65
Discharge: HOME | End: 2020-10-02
Payer: COMMERCIAL

## 2020-10-02 PROCEDURE — 97140 MANUAL THERAPY 1/> REGIONS: CPT

## 2020-10-02 PROCEDURE — 97530 THERAPEUTIC ACTIVITIES: CPT

## 2020-10-02 PROCEDURE — 97535 SELF CARE MNGMENT TRAINING: CPT

## 2020-10-02 NOTE — PROGRESS NOTES
PHYSICAL THERAPY - DAILY TREATMENT NOTE    Patient Name: Supa Nichole        Date: 10/2/2020  : 1955   YES Patient  Verified  Visit #:     Insurance: Payor: Fara Shen / Plan: [employer] HMO / Product Type: HMO /      In time: 8:29 Out time: 9:31   Total Treatment Time: 62     Medicare/[employer] Time Tracking (below)   Total Timed Codes (min):  n/a 1:1 Treatment Time:  n/a     TREATMENT AREA =  Lymphedema, not elsewhere classified [I89.0]    SUBJECTIVE    Pain Level (on 0 to 10 scale):  5  / 10   Medication Changes/New allergies or changes in medical history, any new surgeries or procedures? NO     If yes, update Summary List   Subjective Functional Status/Changes:  []  No changes reported     \"It is better. I had a wonderful day yesterday cooking out with my family. I feel better with the compression. I left the wraps on. But I still feel it in my right heel. But I just take it day by day the way you have to. \"          OBJECTIVE    Modalities Rationale:  To improve activity tolerance for exercise performance and ADL's.    min [] Estim, type/location:                                      []  att     []  unatt     []  w/US     []  w/ice    []  w/heat    min []  Mechanical Traction: type/lbs                   []  pro   []  sup   []  int   []  cont    []  before manual    []  after manual    min []  Ultrasound, settings/location:      min []  Iontophoresis w/ dexamethasone, location:                                               []  take home patch       []  in clinic   n/a min []  Ice     []  Heat    location/position:     min []  Vasopneumatic Device, press/temp:     min []  Other:    [x] Skin assessment post-treatment (if applicable):   []  intact    []  redness- no adverse reaction     []redness - adverse reaction:    12 min Therapeutic Activity: Patient education and return demonstration on donning compression garments with min to moderate assistance for donning RLE after PT showed patient how to candis garment demonstrating with LLE. Rationale: To increase safety and efficiency with ADL's, particularly dressing by donning/doffing compression garment. 25 min Manual Therapy: BLE girth measurements taken today. Multilayer compression bandages applied to BLE: tg stockinetter size 9, cellona cast padding 10-cm x 2 to each leg, no toe wraps, rosidal k bandages 8-cm, 10-cm  and 12-cm x 1 to each leg using modified 3-layer technique. Rationale:      decrease pain and decrease edema  to improve patient's ability to perform ADLs and functional mobility with improved ease and comfort    25 min Self-Care: Patient education on importance of maintainng compression on BLE to reduce swelling, avoiding scratching skin to reduce risk of infection, garment care, garment wash instructions, and garment wearing insructions. Rationale: To promote carryover of lymphedema treatment protocol and increase independence with lymphedema management. min Patient Education:  YES  Reviewed HEP   []  Progressed/Changed HEP based on:   Patient reports compliance     Other Objective/Functional Measures:    Patient reported itching feeling to LLE while in bandages and had scratched her skin with her nails. Small red scratch marks noted to LLE shin. Right LE  9/14/2020 Left LE  9/14/2020 RLE  9/25/20 LLE  9/25/20 RLE  10/2/20 LLE  10/2/20   MTP: 23.5 23.9 23.1 23.8 23.8 23.5   Midfoot/navicular: 23.3 23.8 23.5 24.1 23.2 23.3   Ankle: 24.8 24.3 28.3 28.1 25.1 25.5   Calf     (26 cm from ankle) 36.8 38.4 39.2 36.7 37.4 37.3   Calf:      ( 31cm from ankle) 43.2 42.5 40.5 41.2 39.6 40.2     Significant reduction at bilateral ankles    PT donned LLE garment and patient donned RLE garment with assistance. Max verbal and visual cues needed for garment training. Used gardening gloves to prevent ripping the garment.         Post Treatment Pain Level (on 0 to 10) scale:   5  / 10     ASSESSMENT    Assessment/Changes in Function: Patient continues with pain in right achilles tendon from tendonitis. Patient was compliant with bandages this session. Had itching to LLE while in bandages and had reached down into bandages to scratch and left visible red marks and abrasions to skin. Patient would benefit from completing lymphedema therapy to control swelling to reduce pain in ankle in order to promote therapy for right achilles tendonitis. Patient felt significantly better with bandaging today with decreased right ankle pain. Will continue to benefit from skilled PT to continue to decongest limbs in order to prepare for therapy for achilles tendonitis and to fit better into [place] Walker stockings, size large, 30-40 mmHg. Per last recertification on 8/55/1874 will benefit from 4 more weeks of therapy to control swelling and find garment solution.      []  See Progress Note/Recertification   Patient will continue to benefit from skilled PT services to modify and progress therapeutic interventions, address functional mobility deficits, address ROM deficits, address strength deficits, analyze and address soft tissue restrictions, analyze and cue movement patterns, analyze and modify body mechanics/ergonomics and assess and modify postural abnormalities to attain remaining goals.    Progress toward goals / Updated goals:    1.  Pt will be Independent with compression management routine to maintain LE girth measures.  -ongoing     PLAN    [x]  Upgrade activities as tolerated YES Continue plan of care   []  Discharge due to :    []  Other:      Therapist: Nia Diaz    Date: 10/2/2020 Time: 9:33 AM     Future Appointments   Date Time Provider Sun Ortiz   10/5/2020  1:30 PM Induse Magallon [facility]   10/7/2020 12:30 PM [facility]   10/9/2020 10:00 AM Jeniffer Solisolph [facility]   10/12/2020 10:00 AM Jeniffer Solisolph [facility]   10/14/2020 10:00 AM Jeniffer Solisolph [facility]   10/16/2020 10:00 AM Nicolas Pineda Nacho Sandoval [facility]   10/19/2020 10:00 AM Eladia HO [facility]   10/21/2020 10:00 AM Eladia HO [facility]   10/23/2020  1:30 PM Eladia [facility] [place]

## 2020-10-02 NOTE — PROGRESS NOTES
[facility]  [address]  [place], [address]  [phone] fax    Physical Therapy Progress Note  Patient name: Scott Méndez Start of Care: 2020   Referral source: Kwabena Hawthorne NP : 1955   Medical/Treatment Diagnosis: Lymphedema, not elsewhere classified [I89.0]  Payor: Thomas Theodore / Plan: [address] HMO / Product Type: HMO /  Onset Date:chronic for 1 year     Prior Hospitalization: see medical history Provider#: [id number]   Medications: Verified on Patient Summary List    Comorbidities: HTN  Prior Level of Function:independent without limitations  Visits from Start of Care: 20    Missed Visits: 0      Established Goals:        Excellent         Good         Limited            None  [x] Increased ROM   []  [x]  []  []  [x] Increased Mobility  []  [x]  []  []   [x] Decreased Pain   []  []  [x]  []  [x] Decreased Swelling  []  [x]  []  []    Key Functional Changes: Therapy has consisted of complete decongestive therapy protocol including multilayer compression bandaging, manual lymph drainage, patient education on garment wear, care, and donning/doffing, patient education on skin care routine, and garment fitting. Patient continues to have swelling in BLE but significantly improves with compression with bandages. See girth chart below for progress towards goals. Progress has been inconsistent due to non-compliance with bandages, especially over the weekend. With continued and consistent compression, patient has been able to fit into compression garment. If patient remains compliant with compression bandages, anticipate a few more visits for donning/doffing instruction in order to ensure patient independence with carryover of swelling management at home.  Patient has been diagnosed with RLE achilles tendonitis, and has another therapy referral, which will be addressed separately after completion of lymphedema therapy for swelling management. She has jimi experiencing right achilles tendonitis pain since about 9/23/2020. Right LE  9/14/2020 Left LE  9/14/2020 RLE  9/25/20 LLE  9/25/20 RLE  10/2/20 LLE  10/2/20   MTP: 23.5 23.9 23.1 23.8 23.8 23.5   Midfoot/navicular: 23.3 23.8 23.5 24.1 23.2 23.3   Ankle: 24.8 24.3 28.3 28.1 25.1 25.5   Calf     (26 cm from ankle) 36.8 38.4 39.2 36.7 37.4 37.3   Calf:      ( 31cm from ankle) 43.2 42.5 40.5 41.2 39.6 40.2     Patient would continue to benefit from skilled physical therapy, including therapeutic activity, therapeutic exercises, manual therapy, self-care, and garment fitting and training as she has demonstrated significant increases in swelling when not in proper compression and continues to need skilled compression therapy with instruction to manage swelling. Updated Goals: to be achieved in 1-2 weeks:   1.  Pt will be Independent with compression management routine to maintain LE girth measures. ASSESSMENT/RECOMMENDATIONS:  [x]Continue therapy per initial plan/protocol at a frequency of  3 x per week for 1-2 weeks  []Continue therapy with the following recommended changes:_____________________      _____________________________________________________________________  []Discontinue therapy progressing towards or have reached established goals  []Discontinue therapy due to lack of appreciable progress towards goals  []Discontinue therapy due to lack of attendance or compliance  []Await Physician's recommendations/decisions regarding therapy  []Other:________________________________________________________________    Thank you for this referral.    Julio Cesar Sheppard 10/2/2020, PT, DPT, CLT 11:38 AM  NOTE TO PHYSICIAN:  [address]   FAX TO [facility]: [phone]  If you are unable to process this request in 24 hours please contact our office: [phone]    ?  I have read the above report and request that my patient continue as recommended. ? I have read the above report and request that my patient continue therapy with the following changes/special instructions:__________________________________________________________  ? I have read the above report and request that my patient be discharged from therapy.     Physicians signature: ______________________________Date: ______Time:______

## 2020-10-05 ENCOUNTER — HOSPITAL ENCOUNTER (OUTPATIENT)
Dept: PHYSICAL THERAPY | Age: 65
Discharge: HOME | End: 2020-10-05
Payer: COMMERCIAL

## 2020-10-05 PROCEDURE — 97530 THERAPEUTIC ACTIVITIES: CPT

## 2020-10-05 PROCEDURE — 97535 SELF CARE MNGMENT TRAINING: CPT

## 2020-10-05 NOTE — PROGRESS NOTES
PHYSICAL THERAPY - DAILY TREATMENT NOTE    Patient Name: Tj Baird        Date: 10/5/2020  : 1955   YES Patient  Verified  Visit #:     Insurance: Payor: Myron  / Plan: [employer] HMO / Product Type: HMO /      In time: 133 Out time: 2:38   Total Treatment Time: 65     Medicare/[employer] Time Tracking (below)   Total Timed Codes (min):  n/a 1:1 Treatment Time:  n/a     TREATMENT AREA =  Lymphedema, not elsewhere classified [I89.0]    SUBJECTIVE    Pain Level (on 0 to 10 scale):  5  / 10   Medication Changes/New allergies or changes in medical history, any new surgeries or procedures? NO     If yes, update Summary List   Subjective Functional Status/Changes:  []  No changes reported     \"I brought my  today so he can learn to help me with my socks. I stayed in the wraps like you told me. \"          OBJECTIVE    Modalities Rationale: To improve activity tolerance for exercise performance and ADL's.    min [] Estim, type/location:                                      []  att     []  unatt     []  w/US     []  w/ice    []  w/heat    min []  Mechanical Traction: type/lbs                   []  pro   []  sup   []  int   []  cont    []  before manual    []  after manual    min []  Ultrasound, settings/location:      min []  Iontophoresis w/ dexamethasone, location:                                               []  take home patch       []  in clinic   n/a min []  Ice     []  Heat    location/position:     min []  Vasopneumatic Device, press/temp:     min []  Other:    [x] Skin assessment post-treatment (if applicable):   [x]  intact    []  redness- no adverse reaction     []redness - adverse reaction:     38 min Therapeutic Activity: Patient donning/doffing instructio for 30-40 mmHg compression socks with patient instruction on technique, using gloves, using donning aid. Jennifer Barry return demonstration from patient.  Then provided caregiver instruction for donning/doffing socks with caregiver demonstrating fair return demonstration. Rationale: To increase safety and efficiency with ADL's and dressing. 27 min Self-Care: Direct patient and caregiver education on use of donning aides, donning technique, where to get donning aides, estimate of cost. Patient education on garment wearing schedule, wash instructions, and donning/doffing instructions. Rationale: To promote carryover of lymphedema treatment protocol and increase independence with lymphedema management. min Patient Education:  YES  Reviewed HEP   []  Progressed/Changed HEP based on:   Patient reports compliance     Other Objective/Functional Measures:    BLE remained decongested in bandages with good patient compliance. Continues to need max assist for donning 30-40 mmHg compression garments from PT or .  looking into purchasing slippie gator and pad. Not interested in gant. Provided demonstration on slippie gator and gant to patient. Redness from previous scratches to left shin has resolved. Post Treatment Pain Level (on 0 to 10) scale:   5  / 10     ASSESSMENT    Assessment/Changes in Function:     Patient continues with pain in right achilles tendon from tendonitis. Patient was compliant with bandages this session.  Patient would benefit from completing lymphedema therapy to control swelling to reduce pain in ankle in order to promote therapy for right achilles tendonitis. Will continue to benefit from skilled PT to continue to decongest limbs in order to prepare for therapy for achilles tendonitis and to fit better into [place] Walker stockings, size large, 30-40 mmHg. Per last recertification on 9/12/1122 will benefit from 4 more weeks of therapy to control swelling and find garment solution. Anticipated 1-2 more sessions for compression garment donning then will discharge and start episode of care for right achilles tendonitis.       []  See Progress Note/Recertification   Patient will continue to benefit from skilled PT services to modify and progress therapeutic interventions, address functional mobility deficits, address ROM deficits, address strength deficits, analyze and address soft tissue restrictions, analyze and cue movement patterns, analyze and modify body mechanics/ergonomics and assess and modify postural abnormalities to attain remaining goals. Progress toward goals / Updated goals:     1.  Pt will be Independent with compression management routine to maintain LE girth measures.  - ongoing     PLAN    [x]  Upgrade activities as tolerated YES Continue plan of care   []  Discharge due to :    []  Other:      Therapist: Karen Navarrete    Date: 10/5/2020 Time: 1:35 PM     Future Appointments   Date Time Provider Sun Ortiz   10/7/2020 12:30 PM Keith Powers [facility]   10/9/2020 10:00 AM Lola [place] [facility]   10/12/2020 10:00 AM Lola [place] [facility]   10/14/2020 10:00 AM Lola [place] [facility]   10/16/2020 10:00 AM Lola [place] [facility]   10/19/2020 10:00 AM Lola [place] [facility]   10/21/2020 10:00 AM Lola [place] [facility]   10/23/2020  1:30 PM Lola [place] [facility]

## 2020-10-07 ENCOUNTER — HOSPITAL ENCOUNTER (OUTPATIENT)
Dept: PHYSICAL THERAPY | Age: 65
Discharge: HOME | End: 2020-10-07
Payer: COMMERCIAL

## 2020-10-07 PROCEDURE — 97530 THERAPEUTIC ACTIVITIES: CPT

## 2020-10-07 PROCEDURE — 97535 SELF CARE MNGMENT TRAINING: CPT

## 2020-10-07 NOTE — PROGRESS NOTES
PHYSICAL THERAPY - DAILY TREATMENT NOTE    Patient Name: Igor Coles        Date: 10/7/2020  : 1955   YES Patient  Verified  Visit #:     Insurance: Payor: [employer] / Plan: [employer] [place] HMO / Product Type: HMO /      In time: 12:26 Out time: 1:39   Total Treatment Time: 73     Medicare/BCBS Time Tracking (below)   Total Timed Codes (min):  n/a 1:1 Treatment Time:  n/a     TREATMENT AREA =  Lymphedema, not elsewhere classified [I89.0]    SUBJECTIVE    Pain Level (on 0 to 10 scale):  5   10   Medication Changes/New allergies or changes in medical history, any new surgeries or procedures? NO     If yes, update Summary List   Subjective Functional Status/Changes:  []  No changes reported     \"Well, I am wearing them. But let me tell you. Is there anything I can do to stop it from cutting into my leg at the top? \"          OBJECTIVE    Modalities Rationale: To improve activity tolerance for exercise performance and ADL's.    min [] Estim, type/location:                                      []  att     []  unatt     []  w/US     []  w/ice    []  w/heat    min []  Mechanical Traction: type/lbs                   []  pro   []  sup   []  int   []  cont    []  before manual    []  after manual    min []  Ultrasound, settings/location:      min []  Iontophoresis w/ dexamethasone, location:                                               []  take home patch       []  in clinic   n/a min []  Ice     []  Heat    location/position:     min []  Vasopneumatic Device, press/temp:     min []  Other:    [x] Skin assessment post-treatment (if applicable):   [x]  intact    []  redness- no adverse reaction     []redness - adverse reaction:   Redness noted BLE at top of calves where garment was too tight. Patient had been rolling down the top of the garment, too, and was instructed to avoid rolling to avoid increasing the compression.     45 min Therapeutic Activity: Patient donning/doffing instructio for 30-40 mmHg compression socks with patient instruction on technique, using gloves, using donning aid (slippie gator). Kenyatta Fischer return demonstration from patient. Still required mod to max assist to candis stockings. Custom measurements taken this date for BLE garment fitting due to anticipated need of better stockings. Rationale: To increase safety and efficiency with ADL's and donning garments. 28 min Self-Care: Direct patient and caregiver education on use of donning aides, donning technique, where to get donning aides, estimate of cost. Patient education on garment wearing schedule, wash instructions, and donning/doffing Patient educated to avoid rolling to avoid increasing the compression. Rationale: To promote carryover of lymphedema treatment protocol and increase independence with lymphedema management. min Patient Education:  YES  Reviewed HEP   []  Progressed/Changed HEP based on:   Patient reports compliance     Other Objective/Functional Measures:    Patient presented to clinic in compression socks.  assists at home for donning socks. Red marks noted to bilateral LE below knee anteriorly where the top of the garments had been rolled down due to pain. Patient educated that rolling down the top of the garments increases compression and causes more pain and risks injury. Patient verbalized understanding. Extensive training on slippie gator with fair return demonstration but requires max assistance. Anticipate need for second pair of stockings and may find flat knit 30-40 mmHg stockings more tolerable.           Post Treatment Pain Level (on 0 to 10) scale:   5  / 10     ASSESSMENT    Assessment/Changes in Function:     Patient continues with pain in right achilles tendon from tendonitis. Patient was compliant with compression garment this session. Edwinreta Rojas would benefit from completing lymphedema therapy to control swelling to reduce pain in ankle in order to promote therapy for right achilles tendonitis. Will continue to benefit from skilled PT to continue to decongest limbs in order to prepare for therapy for achilles tendonitis. [place] Walker stockings are not comfortable to patient and anticipate need to order custom flat knit stockings for comfort. Anticipate discharge from therapy for lymphedema next session and transitioning to achilles tendonitis therapy for remainder of approved visits. []  See Progress Note/Recertification   Patient will continue to benefit from skilled PT services to modify and progress therapeutic interventions, address functional mobility deficits, address ROM deficits, address strength deficits, analyze and address soft tissue restrictions, analyze and cue movement patterns, analyze and modify body mechanics/ergonomics and assess and modify postural abnormalities to attain remaining goals. Progress toward goals / Updated goals:    1.  Pt will be Independent with compression management routine to maintain LE girth measures.  - ongoing     PLAN    [x]  Upgrade activities as tolerated YES Continue plan of care   []  Discharge due to :    []  Other:      Therapist: Harvey Lao    Date: 10/7/2020 Time: 3:27 PM     Future Appointments   Date Time Provider Sun Ortiz   10/9/2020 10:00 AM Joann Lecher [facility]   10/12/2020 10:00 AM Joann Lecher [facility]   10/14/2020 10:00 AM Joann Lecher [facility]   10/16/2020 10:00 AM Joann Lecher [facility]   10/19/2020 10:00 AM Joann Lecher [facility]   10/21/2020 10:00 AM Joann Lecher [facility]   10/23/2020  1:30 PM Joann Lecher [facility]

## 2020-10-09 ENCOUNTER — HOSPITAL ENCOUNTER (OUTPATIENT)
Dept: PHYSICAL THERAPY | Age: 65
Discharge: HOME | End: 2020-10-09
Payer: COMMERCIAL

## 2020-10-09 PROCEDURE — 97530 THERAPEUTIC ACTIVITIES: CPT

## 2020-10-09 PROCEDURE — 97535 SELF CARE MNGMENT TRAINING: CPT

## 2020-10-09 NOTE — PROGRESS NOTES
Physical Therapy Discharge Instructions      [facility]  [address]  [place], [address]  [phone] fax    Patient: Madalyn Oakes  : 1955      Continue wearing compression garments every day while awake. Take off when sleeping. Follow up with MD:     [] Upon completion of therapy     [x] As needed      Recommendations:     [x]   Return to activity with home program    []   Return to activity with the following modifications:       []Post Rehab Program    []Join Independent aquatic program     []Return to/join local gym        Additional Comments: Wash garments in cold water, gentle cycle. Hand dry.            Alexey  10/9/2020 10:56 AM

## 2020-10-09 NOTE — PROGRESS NOTES
[facility]  [address] [facility] [address]  [place], [address]  [phone] fax    Physical Therapy Discharge Summary  Patient name: Marc Arcos Start of Care: 2020   Referral source: Estuardo Issa NP : 1955   Medical/Treatment Diagnosis: Lymphedema, not elsewhere classified [I89.0]  Payor: [employer] / Plan: Anitha Marker HMO / Product Type: HMO /  Onset Date:chronic 1 year     Prior Hospitalization: see medical history Provider#: [id number]   Medications: Verified on Patient Summary List    Comorbidities: HTN  Prior Level of Function:independent  Visits from Start of Care: 23    Missed Visits: 0      Summary of Care: Therapy has consisted of complete decongestive therapy protocol including multilayer compression bandaging, manual lymph drainage, patient education on garment wear, care, and donning/doffing, patient education on skin care routine, and garment fitting. Patient has received compression stockings and is independent with donning/doffing, wearing schedule, and garment care. Garment is proper compression but wrinkles at the ankle. A custom compression garment has been ordered for patient to improve comfort. Patient has met all lymphedema goals at this time and is discharged from skilled lymphedema therapy. Goal:   Pt will be Independent with compression management routine to maintain LE girth measures.   Status at last note/certification: ongoing  Status at discharge: met      ASSESSMENT/RECOMMENDATIONS:  [x]Discontinue therapy: [x]Patient has reached or is progressing toward set goals      []Patient is non-compliant or has abdicated      []Due to lack of appreciable progress towards set [address] [place] [address], PT, DPT, CLT 10/9/2020 10:58 AM

## 2020-10-09 NOTE — PROGRESS NOTES
PHYSICAL THERAPY - DAILY TREATMENT NOTE    Patient Name: Denis Simmons        Date: 10/9/2020  : 1955   YES Patient  Verified  Visit #:     Insurance: Payor: Antoinette Espinal / Plan: Mirza Minus HMO / Product Type: HMO /      In time: 10:03 Out time: 11:03   Total Treatment Time: 60     Medicare/BCBS Time Tracking (below)   Total Timed Codes (min):  n/a 1:1 Treatment Time:  n/a     TREATMENT AREA =  Lymphedema, not elsewhere classified [I89.0]    SUBJECTIVE    Pain Level (on 0 to 10 scale):  5  / 10   Medication Changes/New allergies or changes in medical history, any new surgeries or procedures? NO     If yes, update Summary List   Subjective Functional Status/Changes:  []  No changes reported     \"I got a call about the bandages. I will try to pay for them. My socks are still not comfortable. \"          OBJECTIVE    Modalities Rationale: To improve activity tolerance for exercise performance and ADL's.    min [] Estim, type/location:                                      []  att     []  unatt     []  w/US     []  w/ice    []  w/heat    min []  Mechanical Traction: type/lbs                   []  pro   []  sup   []  int   []  cont    []  before manual    []  after manual    min []  Ultrasound, settings/location:      min []  Iontophoresis w/ dexamethasone, location:                                               []  take home patch       []  in clinic   n/a min []  Ice     []  Heat    location/position:     min []  Vasopneumatic Device, press/temp:     min []  Other:    [x] Skin assessment post-treatment (if applicable):   [x]  intact    []  redness- no adverse reaction     []redness - adverse reaction:      30 min Therapeutic Activity: Measurements re-taken for custom compression garments to promote improved comfort and ease with donning/doffing compression socks. BLE measurements taken to assess for LE limb volume and assess containment of 30-40 mmhg stockings. Rationale:    To improve comfort of LE stockings, improve ease of donning stockings. 30 min Self-Care: Patient education on benefits of custom stockings with comfort patch, education on garment ordering process, education on HEP including wearing current stockings. Rationale: To promote carryover of lymphedema treatment protocol and increase independence with lymphedema management. min Patient Education:  YES  Reviewed HEP   []  Progressed/Changed HEP based on:   Patient reports compliance     Other Objective/Functional Measures:    See girth measurements. Will order custom compression stockings from [facility] to improve patient comfort as current compression socks are uncomfortable at times and roll down. Appear to have proper containment with 30-40 mmhg but socks are too long. Patient is discharged from lymphedema therapy to transistion to therapy for achilles tendonitis. Post Treatment Pain Level (on 0 to 10) scale:   5  / 10     ASSESSMENT    Assessment/Changes in Function:     Patient is discharged from lymphedema therapy as she returned to therapy after independently donning compression socks. Socks are appropriate compression but too long and roll at the top. Will order custom compression stockings for patient. Patient is discharged from skilled lymphedema therapy and plans to return to initiate therapy for right achilles tendonitis. []  See Progress Note/Recertification   Patient is discharged from lymphedema therapy as all goals are met. Progress toward goals / Updated goals:    1.  Pt will be Independent with compression management routine to maintain LE girth measures. - Met     PLAN    []  Upgrade activities as tolerated YES Continue plan of care   [x]  Discharge due to :  All goals met   []  Other:      Therapist: Collette Milch    Date: 10/9/2020 Time: 10:58 AM     Future Appointments   Date Time Provider Sun Ortiz   10/12/2020 10:00 AM Akira Peter [facility]   10/14/2020 10:00 AM Marisa Bray [facility]   10/16/2020 10:00 AM Ru Marking [facility]   10/19/2020 10:00 AM Ru Marking [facility]   10/21/2020 10:00 AM Ru Marking [facility]   10/23/2020  1:30 PM Ru Marking [facility]

## 2020-10-12 ENCOUNTER — HOSPITAL ENCOUNTER (OUTPATIENT)
Dept: PHYSICAL THERAPY | Age: 65
Discharge: HOME | End: 2020-10-12
Payer: COMMERCIAL

## 2020-10-12 PROCEDURE — 97162 PT EVAL MOD COMPLEX 30 MIN: CPT

## 2020-10-12 PROCEDURE — 97535 SELF CARE MNGMENT TRAINING: CPT

## 2020-10-12 PROCEDURE — 97110 THERAPEUTIC EXERCISES: CPT

## 2020-10-12 NOTE — PROGRESS NOTES
[facility]  [address]  [place], [address]  [phone] fax    Plan of Care/ Statement of Necessity for Physical Therapy Services    Patient name: Dago Garza Start of Care: 10/12/2020   Referral source: Leah Mason MD : 1955    Medical Diagnosis: Right ankle pain [M25.571]  Left ankle pain [M25.572]  Payor: [facility] / Plan: [facility] [employer] H[employer] / Product Type: HMO /  Onset Date: progressive for about 2 months and then acute on 2020    Treatment Diagnosis: right ankle pain, M25.571   Prior Hospitalization: see medical history Provider#: [id number]   Medications: Verified on Patient summary List    Comorbidities: HTN, BLE lymphedema   Prior Level of Function: independent with ADLs and mobility with limitations due to heel pain. The Plan of Care and following information is based on the information from the initial evaluation. Assessment/ key information: Patient is 49-year-old female who presents to physical therapy with chief complaint of bilateral heel and ankle pain, right worse than left. Pain started with insidious onset about 2 months ago but then rapidly progressed to acute pain prompting emergency vitis to orthopedic doctor on 2020. Patient was undergoing physical therapy for BLE lymphedema at the time of sudden heel pain. Imaging revealed large bone spur at insertion of right and left achilles tendon at insertion point. Imaging also confirmed significant soft tissue swelling consistent with patient's history of lymphedema. 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  On evaluation, patient demonstrated significant right heel pain, little to no left heel pain, chronic BLE swelling consistent with chronic lymphedema, tenderness to palpation at insertion of right achilles tendon, decreased gastroc flexibility on right vs left, impaired right ankle dorsiflexion ROM right vs left, and decreased plantarflexion strength right vs left. Patient positive for right achilles tendon pain, negative Royal Mullen test for achilles tendonitis, and negative for arc test for achilles tendonitis. Patient presents with signs and symptoms consistent with suspected right achilles tendonitis vs bursitis. Patient would benefit from skilled physical therapy to address the above deficits in order to promote ability to perform mobility and ADLs with improved comfort and ease. Evaluation Complexity History MEDIUM  Complexity : 1-2 comorbidities / personal factors will impact the outcome/ POC ; Examination MEDIUM Complexity : 3 Standardized tests and measures addressing body structure, function, activity limitation and / or participation in recreation  ;Presentation MEDIUM Complexity : Evolving with changing characteristics  ; Clinical Decision Making MEDIUM Complexity : FOTO score of 26-74  Overall Complexity Rating: MEDIUM  Problem List: pain affecting function, decrease ROM, decrease strength, edema affecting function, impaired gait/ balance, decrease ADL/ functional abilitiies, decrease activity tolerance and decrease flexibility/ joint mobility   Treatment Plan may include any combination of the following: Therapeutic exercise, Therapeutic activities, Neuromuscular re-education, Physical agent/modality, Gait/balance training, Manual therapy, Aquatic therapy, Patient education, Self Care training, Functional mobility training, Home safety training and Stair training  Patient / Family readiness to learn indicated by: asking questions, trying to perform skills and interest  Persons(s) to be included in education: patient (P)  Barriers to Learning/Limitations: None  Patient Goal (s): To have less pain  Patient Self Reported Health Status: good  Rehabilitation Potential: good    Short Term Goals:  To be accomplished in 3 treatments:  Goal: Pt to be compliant with initial HEP to improve LE flexibility and strength. Status at last note/certification: Established and reviewed with Pt    Long Term Goals: To be accomplished in 6 treatments:  Goal: Pt to increase right plantarflexion strength as evidence by ability to complete single RLE heel raise >/= 14 reps to negotiate stairs in community with reciprocal pattern without compensations or pain. Status at last note/certification: 0 repetitions; 14 repetitions on left  Goal: Pt to will improve FOTO score from 51% to 65% in order to improve functional independence and mobility  Status at last note/certification: 09% (69% limitation)  Goal: Pt to report < 5/10 pain at worst to promote quality of life  Status at last note/certification: 81/67 pain at worst  Goal:  Patient will improve LEFS score from 32 to 41 points in order to improve functional independence and mobility. Frequency / Duration: Patient to be seen 1-2 times per week for 6 treatments. Patient/ Caregiver education and instruction: Diagnosis, prognosis, self care and exercises   [x]  Plan of care has been reviewed with MAGUI Sweet 10/12/2020, PT, DPT, CLT 2:49 PM    ________________________________________________________________________    I certify that the above Therapy Services are being furnished while the patient is under my care. I agree with the treatment plan and certify that this therapy is necessary.     Physician's Signature:____________Date:_________TIME:________    ** Signature, Date and Time must be completed for valid certification **    Please sign and return to [facility] [address] [place] [address] & [facility]  [address]  [place], [address]  [phone] fax

## 2020-10-12 NOTE — PROGRESS NOTES
PHYSICAL THERAPY - DAILY TREATMENT NOTE    Patient Name: Kale Whittaker        Date: 10/12/2020  : 1955   YES Patient  Verified  Visit #:   1   of   7  Insurance: Payor: Leah De Santiago / Plan: Janki Diamond HMO / Product Type: HMO /      In time: 10:08 Out time: 11:07   Total Treatment Time: 59     Medicare Time Tracking (below)   Total Timed Codes (min):  n/a 1:1 Treatment Time:  n/a     TREATMENT AREA =  Bilateral achilles tendonitis, right worse than left    SUBJECTIVE    Pain Level (on 0 to 10 scale):  5  / 10   Medication Changes/New allergies or changes in medical history, any new surgeries or procedures? YES     If yes, update Summary List   Subjective Functional Status/Changes:  []  No changes reported     History of Condition: Patient was undergoing treatment for BLE lymphedema and had gradual onset of bilateral ankle/achilles pain that had sudden progression in acute pain on the morning of 2020. Patient presented to orthopedic doctor that morning and was determined to have bilateral achilles tendonitis. Imaging revealed large right bone spur at right achilles. Describes pain as intermittent, coming on randomly, sharp pain. Aggravating Factors: walking, comes on in sitting    Alleviating Factors: goes away on its own gradually    Previous Treatment: n/a    PMHx:see chart    Social/Recreational/Work: Works as CNA at nights. Pt Goals:  \"To get the pain bhumi\"    FOTO: 51%         OBJECTIVE  Physical Therapy Evaluation  - Foot and Ankle    Gait: [] Normal    [] Abnormal    [x] Antalgic    [] NWB    Device:none  Describe: antalgic on right    ROM/Strength  [] Unable to assess at this time      AROM        PROM            Strength (1-5)   Left Right Left Right Left  Right   Dorsiflexion 10 5   5 5   Plantarflexion 38 32       Inversion 21 11   5 5   Eversion 24 15   5 5   Great Toe Ext 28 41       Great Toe Flex 30 30         Flexibility: [] Unable to assess at this time  Gastroc:    (L) Tightness [] WNL   [] Min   [x] Mod   [] Severe 11 deg dorsiflexion knee straight   (R) Tightness [] WNL   [] Min   [] Mod   [x] Severe 3 deg dorsiflexion knee straight  Soleus:    (L) Tightness [] WNL   [] Min   [] Mod   [] Severe    (R) Tightness [] WNL   [] Min   [] Mod   [] Severe  Other:      (L) Tightness [] WNL   [] Min   [] Mod   [] Severe    (R) Tightness [] WNL   [] Min   [] Mod   [] Severe    Palpation:   Location: right achilles tendon lower portion and at insertion in calcaneous  Patient's Pain Response: [] Min   [x] Mod   [] Severe  Location:  Patient's Pain Response: [] Min   [] Mod   [] Severe    Optional Tests:   Navicular Drop: Right 5 mm Left 3 mm    Sub-talar alignment: [] Neutral     [] Pronation      [] Supination    Forefoot alignment:  [] Neutral     [] Varus            [] Valgus    Swelling:   Left (cm) Right (cm)   MTH:  24.0 23.5   midfoot: 24.2 23.8   ankle:  28.9 27.2     Anterior Drawer: [] Neg    [] Pos  Posterior Drawer:  [x] Neg    [] Pos  Inversion Stress:  [] Neg    [] Pos  Talar Tilt:   [x] Neg    [] Pos  Eversion Stress:  [] Neg    [] Pos  Soraida's Sign:  [x] Neg    [] Pos  Reyez Test: [x] Neg    [] Pos    Repeated single leg heel raises: left 14; right 0      25 min Therapeutic Exercise:  [x]  See flow sheet   Rationale:      increase ROM and increase strength to improve the patients ability to ambulate with reduced pain and improved efficiency. 10 min Self-Care: Patient education achilles tendonitis vs bursitis, reviewed HEP with patient, education patient on importance of supportive shoes and wearing compression garments to reduce pain. Rationale: To promote home management of heel pain, educate on plan of care, and promote compliance with therapy.     24 min [x]? Eval                  []?Re-Eval         min Patient Education:  YES  Reviewed HEP   []  Progressed/Changed HEP based on:   HEP initiated.  Handout provided     Other Objective/Functional Measures:    Rigid and high arches bilaterally with minimal change on navicular drop. Left achilles tendon not tender to palpation today and has been causing less pain than right. Negative Royal Mullen Test for Achilles Tendonitis and negative arc test for achilles tendonitis RLE     Post Treatment Pain Level (on 0 to 10) scale:   5  / 10     ASSESSMENT    Assessment/Changes in Function:     Justification for Eval Code Complexity:  Patient History (low 0, mod 1-2, high 3-4): lymphedema, heel spur, HTN  Examination (low 1-2, mod 3+, high 4+): ROM, strength, arch heigh and drop, functional mobility, swelling  Clinical Presentation (low stable or uncomplicated, mod evolving or changing, high unstable or unpredictable): moderate evolving  Clinical Decision Making (low , mod 26-74, high 1-25): FOTO moderate 51%    See PoC     []  See Progress Note/Recertification   Patient will continue to benefit from skilled PT services to modify and progress therapeutic interventions, address functional mobility deficits, address ROM deficits, address strength deficits, analyze and address soft tissue restrictions, analyze and cue movement patterns, analyze and modify body mechanics/ergonomics, assess and modify postural abnormalities and instruct in home and community integration to attain remaining goals.    Progress toward goals / Updated goals:    Goals established, see PoC     PLAN    [x]  Upgrade activities as tolerated YES Continue plan of care   []  Discharge due to :    [x]  Other: Initiate PoC     Therapist: Nida Record    Date: 10/12/2020 Time: 10:19 AM

## 2020-10-14 ENCOUNTER — HOSPITAL ENCOUNTER (OUTPATIENT)
Dept: PHYSICAL THERAPY | Age: 65
Discharge: HOME | End: 2020-10-14
Payer: COMMERCIAL

## 2020-10-14 PROCEDURE — 97035 APP MDLTY 1+ULTRASOUND EA 15: CPT

## 2020-10-14 PROCEDURE — 97110 THERAPEUTIC EXERCISES: CPT

## 2020-10-14 NOTE — PROGRESS NOTES
PHYSICAL THERAPY - DAILY TREATMENT NOTE    Patient Name: Supa Nichole        Date: 10/14/2020  : 1955   YES Patient  Verified  Visit #:   2   of   7  Insurance: Payor: Fara Shen / Plan: [address] [place] [address] [place] H[employer] / Product Type: HMO /      In time: 10:01 Out time: 11:01   Total Treatment Time: 60     Medicare Time Tracking (below)   Total Timed Codes (min):  n/a 1:1 Treatment Time:  n/a     TREATMENT AREA =  Achilles tendinitis, right leg [M76.61]  Achilles tendinitis, left leg [M76.62]    SUBJECTIVE    Pain Level (on 0 to 10 scale):  9  / 10   Medication Changes/New allergies or changes in medical history, any new surgeries or procedures? NO     If yes, update Summary List   Subjective Functional Status/Changes:  []  No changes reported     \"I'm not doing. I rolled the socks down again because they are too uncomfortable at the top. I wore them all at work though. \"          OBJECTIVE    Modalities Rationale:   To reduce pain, promote tissue healing.      min [] Estim, type/location:                                      []  att     []  unatt     []  w/US     []  w/ice    []  w/heat    min []  Mechanical Traction: type/lbs                   []  pro   []  sup   []  int   []  cont    []  before manual    []  after manual   15 min [x]  Ultrasound, settings/location:  3.3 MHz, 1.0 w/cm2, pulsed, 50% duty cycle, right achilles tendon, 8 minutes plus time for education and setup, tolerated well    min []  Iontophoresis w/ dexamethasone, location:                                               []  take home patch       []  in clinic    min []  Ice     []  Heat    location/position:     min []  Vasopneumatic Device, press/temp:     min []  Other:    [x] Skin assessment post-treatment (if applicable):   [x]  intact    []  redness- no adverse reaction     []redness - adverse reaction:      45 min Therapeutic Exercise:  [x]  See flow sheet   Rationale:      increase ROM, increase strength and reduce pain to improve the patients ability to perform functional mobility with increased independence and safety and perform ADLs with increased ease. min Patient Education:  YES  Reviewed HEP   []  Progressed/Changed HEP based on:   Patient reports compliance     Other Objective/Functional Measures:    Continues to roll down compression stockings at work due to pain at the border of the stockings. Continues to be educated on avoiding rolling the socks due to increased compression and risk for injury when rolling the stockings. Contacted [facility] to check on stocking order. Tolerated ultrasound well. Continues with impaired plantarflexion strength complicated by achilles tendon pain. Unable to fully complete heel raise in standing. Post Treatment Pain Level (on 0 to 10) scale:   5  / 10     ASSESSMENT    Assessment/Changes in Function:     Patient continues with significant pain to right achilles tendon region with signs and symptoms consistent with achilles tendonitis vs possible concomitant retrocalcaneal bursitis. Tolerated eccentric loading exercises for plantarflexors muscles but continues with significant pain and impaired BLE plantarflexion strength, right worse than left. Unable to fully complete a true heel raise in standing but performed better in sitting. Reports compliance with HEP. Patient will continue to benefit from skilled physical therapy to address deficits, improve pain, and promote functional performance. []  See Progress Note/Recertification   Patient will continue to benefit from skilled PT services to modify and progress therapeutic interventions, address functional mobility deficits, address ROM deficits, address strength deficits, analyze and address soft tissue restrictions, analyze and cue movement patterns, analyze and modify body mechanics/ergonomics, assess and modify postural abnormalities and instruct in home and community integration to attain remaining goals.    Progress toward goals / Updated goals:    Short Term Goals: To be accomplished in 3 treatments:  Goal: Pt to be compliant with initial HEP to improve LE flexibility and strength. Status at last note/certification: reports compliance, reviewed HEP     Long Term Goals: To be accomplished in 6 treatments:  Goal: Pt to increase right plantarflexion strength as evidence by ability to complete single RLE heel raise >/= 14 reps to negotiate stairs in community with reciprocal pattern without compensations or pain. Status at last note/certification: 0 repetitions; 14 repetitions on left   Goal: Pt to will improve FOTO score from 51% to 65% in order to improve functional independence and mobility  Status at last note/certification: 51% (82% limitation)  Goal: Pt to report < 5/10 pain at worst to promote quality of life  Status at last note/certification: 10/10 pain at worst  Goal:  Patient will improve LEFS score from 32 to 41 points in order to improve functional independence and mobility.       PLAN    [x]  Upgrade activities as tolerated YES Continue plan of care   []  Discharge due to :    []  Other:      Therapist: Tru James    Date: 10/14/2020 Time: 10:22 AM     Future Appointments   Date Time Provider Sun Ortiz   10/19/2020 10:00 AM Laya Lard [facility]   10/21/2020 10:00 AM Laya Lard [facility]   11/2/2020 12:30 PM Laya Lard [facility]   11/4/2020 12:30 PM Laya Lard [facility]

## 2020-10-16 ENCOUNTER — APPOINTMENT (OUTPATIENT)
Dept: PHYSICAL THERAPY | Age: 65
End: 2020-10-16
Payer: COMMERCIAL

## 2020-10-19 ENCOUNTER — HOSPITAL ENCOUNTER (OUTPATIENT)
Dept: PHYSICAL THERAPY | Age: 65
Discharge: HOME | End: 2020-10-19
Payer: COMMERCIAL

## 2020-10-19 PROCEDURE — 97035 APP MDLTY 1+ULTRASOUND EA 15: CPT

## 2020-10-19 PROCEDURE — 97110 THERAPEUTIC EXERCISES: CPT

## 2020-10-19 NOTE — PROGRESS NOTES
PHYSICAL THERAPY - DAILY TREATMENT NOTE    Patient Name: Supa Nichole        Date: 10/19/2020  : 1955   YES Patient  Verified  Visit #:   3   of   7  Insurance: Payor: Fara Shen / Plan: [employer] / Product Type: HMO /      In time: 10:01 Out time: 10:55   Total Treatment Time: 54     Medicare/BCBS Time Tracking (below)   Total Timed Codes (min):  n/a 1:1 Treatment Time:  n/a     TREATMENT AREA =  Achilles tendinitis, right leg [M76.61]  Achilles tendinitis, left leg [M76.62]    SUBJECTIVE    Pain Level (on 0 to 10 scale):  5  / 10   Medication Changes/New allergies or changes in medical history, any new surgeries or procedures? NO     If yes, update Summary List   Subjective Functional Status/Changes:  [x]  No changes reported     \"The socks are still hurting at the top. I've been working since I saw you last time. \"          OBJECTIVE    Modalities Rationale: To improve activity tolerance for exercise performance and ADL's.    min [] Estim, type/location:                                      []  att     []  unatt     []  w/US     []  w/ice    []  w/heat    min []  Mechanical Traction: type/lbs                   []  pro   []  sup   []  int   []  cont    []  before manual    []  after manual   15 min [x]  Ultrasound, settings/location:  3.3 MHz, 1.0 w/cm2, pulsed, 50% duty cycle, right achilles tendon, 10 minutes plus time for setup.  Tolerated well.     min []  Iontophoresis w/ dexamethasone, location:                                               []  take home patch       []  in clinic    min []  Ice     []  Heat    location/position:     min []  Vasopneumatic Device, press/temp:     min []  Other:    [x] Skin assessment post-treatment (if applicable):   [x]  intact    []  redness- no adverse reaction     []redness - adverse reaction:    39 min Therapeutic Exercise:  [x]  See flow sheet   Rationale:      increase ROM and increase strength to improve the patients ability to perform functional mobility with increased independence and safety and perform ADLs with increased ease. min Patient Education:  YES  Reviewed HEP   []  Progressed/Changed HEP based on:   Patient reports moderate compliance; limited by working every day. Other Objective/Functional Measures:    Presented without compression stockings. Continues to state the stockings hurt at the top border. No redness noted. Skin intact. Continues to require education for stocking compliance. Continues with decreased BLE plantarflexion strength and is challenged with standing heel raises. Less pain today after ultrasound treatment. Reports moderate compliance with HEP       Post Treatment Pain Level (on 0 to 10) scale:   4  / 10     ASSESSMENT    Assessment/Changes in Function:     Patient continues with significant pain to right achilles tendon region with signs and symptoms consistent with achilles tendonitis vs possible concomitant retrocalcaneal bursitis. Tolerated eccentric loading exercises for plantarflexors muscles but continues with significant pain and impaired BLE plantarflexion strength, right worse than left. Unable to fully complete a true heel raise in standing but performed better in sitting. Reports moderate compliance with HEP but endorsed having limited time and energy for HEP do to work schedule. Patient will continue to benefit from skilled physical therapy to address deficits, improve pain, and promote functional performance.         []  See Progress Note/Recertification   Patient will continue to benefit from skilled PT services to modify and progress therapeutic interventions, address functional mobility deficits, address ROM deficits, address strength deficits, analyze and address soft tissue restrictions, analyze and cue movement patterns, analyze and modify body mechanics/ergonomics and assess and modify postural abnormalities to attain remaining goals.    Progress toward goals / Updated goals:    Short Term Goals: To be accomplished in 3 treatments:  Goal: Pt to be compliant with initial HEP to improve LE flexibility and strength. Status at last note/certification: ongoing, reviewed HEP     Long Term Goals: To be accomplished in 6 treatments:  Goal: Pt to increase right plantarflexion strength as evidence by ability to complete single RLE heel raise >/= 14 reps to negotiate stairs in community with reciprocal pattern without compensations or pain. Status at last note/certification: 0 repetitions; 14 repetitions on left   Goal: Pt to will improve FOTO score from 51% to 65% in order to improve functional independence and mobility  Status at last note/certification: 51% (75% limitation)  Goal: Pt to report < 5/10 pain at worst to promote quality of life  Status at last note/certification: 10/10 pain at worst  Goal:  Patient will improve LEFS score from 32 to 41 points in order to improve functional independence and mobility.       PLAN    [x]  Upgrade activities as tolerated YES Continue plan of care   []  Discharge due to :    []  Other:      Therapist: Juanjo Arambula    Date: 10/19/2020 Time: 10:09 AM     Future Appointments   Date Time Provider Sun Ortiz   [facility]2020 [facility]   [facility]2020 [facility]   11/4/2020 12:30 PM Cesia Fuller [facility]

## 2020-10-21 ENCOUNTER — HOSPITAL ENCOUNTER (OUTPATIENT)
Dept: PHYSICAL THERAPY | Age: 65
Discharge: HOME | End: 2020-10-21
Payer: COMMERCIAL

## 2020-10-21 PROCEDURE — 97035 APP MDLTY 1+ULTRASOUND EA 15: CPT

## 2020-10-21 PROCEDURE — 97110 THERAPEUTIC EXERCISES: CPT

## 2020-10-21 NOTE — PROGRESS NOTES
PHYSICAL THERAPY - DAILY TREATMENT NOTE    Patient Name: Marc Arcos        Date: 10/21/2020  : 1955   YES Patient  Verified  Visit #:   4   of   7  Insurance: Payor: Rosaura Michel / Plan: [employer] HMO / Product Type: HMO /      In time: 9:52 Out time: 10:56   Total Treatment Time: 64     Medicare Time Tracking (below)   Total Timed Codes (min):  n/a 1:1 Treatment Time:  n/a     TREATMENT AREA =  Achilles tendinitis, right leg [M76.61]  Achilles tendinitis, left leg [M76.62]    SUBJECTIVE    Pain Level (on 0 to 10 scale):  6  / 10   Medication Changes/New allergies or changes in medical history, any new surgeries or procedures? NO     If yes, update Summary List   Subjective Functional Status/Changes:  [x]  No changes reported     \"These socks are still cutting into me. The heel pain has its good days and bad days\"          OBJECTIVE    Modalities Rationale: To reduce pain, promote tissue healing.         min [] Estim, type/location:                                      []  att     []  unatt     []  w/US     []  w/ice    []  w/heat    min []  Mechanical Traction: type/lbs                   []  pro   []  sup   []  int   []  cont    []  before manual    []  after manual   15 min [x]  Ultrasound, settings/location:  3.3 MHz, 1.2 w/cm2, pulsed, 50% duty cycle, right achilles tendon, 10 minutes plus time for setup.  Tolerated well.     min []  Iontophoresis w/ dexamethasone, location:                                               []  take home patch       []  in clinic    min []  Ice     []  Heat    location/position:     min []  Vasopneumatic Device, press/temp:     min []  Other:    [x] Skin assessment post-treatment (if applicable):   [x]  intact    []  redness- no adverse reaction     []redness - adverse reaction:      49 min Therapeutic Exercise:  [x]  See flow sheet   Rationale:      increase ROM and increase strength to improve the patients ability to perform functional mobility with increased independence and safety and perform ADLs with increased ease. min Patient Education:  YES  Reviewed HEP   []  Progressed/Changed HEP based on:   Patient reports compliance at home and at work     Other Objective/Functional Measures:    Reports right heel pain waxes and wanes. Endorses that left heel pain is nearly completely resolved. Added single leg stance due to patient reports of feeling \"unbalanced\" on right leg. Completed 30 sec single leg stance on right leg without difficulty         Post Treatment Pain Level (on 0 to 10) scale:   6  / 10     ASSESSMENT    Assessment/Changes in Function:     Patient continues with significant pain to right achilles tendon region with signs and symptoms consistent with achilles tendonitis vs possible concomitant retrocalcaneal bursitis. Tolerated eccentric loading exercises for plantarflexors muscles but continues with significant pain and impaired BLE plantarflexion strength, right worse than left. Unable to fully complete a true heel raise in standing but educated on practicing single RLE heel raises when in less pain when the pain fluctuates at [place]. Reports more compliance with HEP at work and at home Patient will continue to benefit from skilled physical therapy to address deficits, improve pain, and promote functional performance. []  See Progress Note/Recertification   Patient will continue to benefit from skilled PT services to modify and progress therapeutic interventions, address functional mobility deficits, address ROM deficits, address strength deficits, analyze and address soft tissue restrictions, analyze and cue movement patterns and analyze and modify body mechanics/ergonomics to attain remaining goals. Progress toward goals / Updated goals:    Short Term Goals: To be accomplished in 3 treatments:  Goal: Pt to be compliant with initial HEP to improve LE flexibility and strength.   Status at last note/certification: MET     Long Term Goals: To be accomplished in 6 treatments:  Goal: Pt to increase right plantarflexion strength as evidence by ability to complete single RLE heel raise >/= 14 reps to negotiate stairs in community with reciprocal pattern without compensations or pain.   Status at last note/certification: 0 repetitions; 14 repetitions on left   Goal: Pt to will improve FOTO score from 51% to 65% in order to improve functional independence and mobility  Status at last note/certification: 51% (48% limitation)  Goal: Pt to report < 5/10 pain at worst to promote quality of life  Status at last note/certification: 10/10 pain at worst  Goal:  Patient will improve LEFS score from 32 to 41 points in order to improve functional independence and mobility.      PLAN    [x]  Upgrade activities as tolerated YES Continue plan of care   []  Discharge due to :    []  Other:      Therapist: Abdulkadir Sylvester    Date: 10/21/2020 Time: 10:05 AM     Future Appointments   Date Time Provider Snu Ortiz   11/2/2020 12:30 PM Emely Leavitt [facility]   11/4/2020 12:30 PM Emely [facility]

## 2020-10-23 ENCOUNTER — APPOINTMENT (OUTPATIENT)
Dept: PHYSICAL THERAPY | Age: 65
End: 2020-10-23
Payer: COMMERCIAL

## 2020-11-02 ENCOUNTER — HOSPITAL ENCOUNTER (OUTPATIENT)
Dept: PHYSICAL THERAPY | Age: 65
Discharge: HOME | End: 2020-11-02
Payer: COMMERCIAL

## 2020-11-02 PROCEDURE — 97535 SELF CARE MNGMENT TRAINING: CPT

## 2020-11-02 NOTE — PROGRESS NOTES
PHYSICAL THERAPY - DAILY TREATMENT NOTE    Patient Name: Jaci Oliver        Date: 2020  : 1955   YES Patient  Verified  Visit #:   5   of   7  Insurance: Payor: Carmita French / Plan: [employer] HMO / Product Type: HMO /      In time: 12:37 Out time: 13:36   Total Treatment Time: 59     Medicare Time Tracking (below)   Total Timed Codes (min):  n/a 1:1 Treatment Time:  n/a     TREATMENT AREA =  Achilles tendinitis, right leg [M76.61]  Achilles tendinitis, left leg [M76.62]    SUBJECTIVE    Pain Level (on 0 to 10 scale):  5  / 10   Medication Changes/New allergies or changes in medical history, any new surgeries or procedures? NO     If yes, update Summary List   Subjective Functional Status/Changes:  []  No changes reported     \"Let me tell you. I was in hell with those socks. I had to take them off at work because they were so painful. I want to keep working but this is too painful. \"          OBJECTIVE        59 min Self Care: Patient education on different solution for swelling in legs, education on not wearing her [place] Walker compression stockings any more. Discussed recommendation of off the shelf 20-30 mmHg compression socks that would be easier to candis/doff and provide some level of compression. Education on importance of continuing with HEP for achilles tendonitis. Education on orthotic solution for achilles tendonitis as therapeutic exercises have not proven successful to date. Education on orthotic with slight heel rise and arch support. Education on approval for [employer] and to call [employer] for price and payment should patient wish to proceed with prior therapists original order. Rationale: To educate patient on effective HEP and compression solution for achilles tendonitis/BLE edema to promote effective home carryover of therapy solutions to reduce pain, improve functional mobility and activity tolerance, and improve participation in work activities and recreational pursuits. min Patient Education:  YES  Reviewed HEP   []  Progressed/Changed HEP based on:   Patient reports compliance     Other Objective/Functional Measures:    Patient extremely tearful during entire session and required active and empathetic listening in private room. Unable to perform therapeutic exercises today due to anxiety and stress about her compression stockings causing her pain and her ongoing achilles tendonitis pain. Patient educated on NOT wearing her [place] Walker 30-40 mmHg stockings anymore. Educated on purchasing off the shelf stockings 20-30 mmHg from online vendor due to financial constraints. Educated about importance of getting socks that have 20-30 mmHg. Patient interested in [address] [place], [place] [address] [employer] brand. .     Educated on continued adherence to HEP as tolerated for achilles tendonitis. Educated on possible benefit of orthotic with slight heel raise and arch support to decrease pain. Daughter present during entire session and actively asking questions. Answered all patient's and daughter's questions. Patient instructed to contact [employer] about price for the [employer] Comprefit calf wrap that was ordered by previous therapist and discussed benefit of wrap and night time compression but ultimately patient will decide based on price of garment. Post Treatment Pain Level (on 0 to 10) scale:   5  / 10     ASSESSMENT    Assessment/Changes in Function:     Patient presented to therapy today extremely emotional about the suffering she has endured with her LE edema and her achilles tendonitis. She required extensive active listening and empathetic listening this date and was limited in therapeutic exercises performed. After discussing patient's increased pain and extreme discomfort with her [place] Walker stockings, patient has been instructed not to wear the compression stockings anymore.  As patient will definitely swell without compression, educated on benefit of 20-30 mmHg stockings to promote ease of donning, less pain with compression, and less emotional distress. Patient verbalized understanding and plans to purchase an off the shelf 20-30 mmHg compression garment on her own due to high costs of lymphedema brands. Patient also looking into orthotics for feet to be worn in tennis shoes to promote foot support and decreased tendonitis pain. Patient is to call [employer] about approved Sig[employer]is Comprefit calf wrap that previous therapist ordered. Patient to decide on pursing calf wrap based on price. []  See Progress Note/Recertification   Patient will continue to benefit from skilled PT services to modify and progress therapeutic interventions, address functional mobility deficits, address ROM deficits, address strength deficits, analyze and address soft tissue restrictions, analyze and cue movement patterns, analyze and modify body mechanics/ergonomics and manage LE edema to attain remaining goals. Progress toward goals / Updated goals:    Short Term Goals: To be accomplished in 3 treatments:  Goal: Pt to be compliant with initial HEP to improve LE flexibility and strength. Status at last note/certification: MET     Long Term Goals: To be accomplished in 6 treatments:  Goal: Pt to increase right plantarflexion strength as evidence by ability to complete single RLE heel raise >/= 14 reps to negotiate stairs in community with reciprocal pattern without compensations or pain.   Status at last note/certification: 0 repetitions; 14 repetitions on left   Goal: Pt to will improve FOTO score from 51% to 65% in order to improve functional independence and mobility  Status at last note/certification: 51% (52% limitation)  Goal: Pt to report < 5/10 pain at worst to promote quality of life  Status at last note/certification: 10/10 pain at worst  Goal:  Patient will improve LEFS score from 32 to 41 points in order to improve functional independence and mobility.      PLAN    [x]  Upgrade activities as tolerated YES Continue plan of care   []  Discharge due to :    []  Other:      Therapist: Madelaine Perez    Date: 11/2/2020 Time: 2:31 PM     Future Appointments   Date Time Provider Sun Ortiz   11/10/2020 10:00 AM Cortney Ardon [facility]

## 2020-11-04 ENCOUNTER — APPOINTMENT (OUTPATIENT)
Dept: PHYSICAL THERAPY | Age: 65
End: 2020-11-04
Payer: COMMERCIAL

## 2020-11-10 ENCOUNTER — HOSPITAL ENCOUNTER (OUTPATIENT)
Dept: PHYSICAL THERAPY | Age: 65
Discharge: HOME | End: 2020-11-10
Payer: COMMERCIAL

## 2020-11-10 PROCEDURE — 97535 SELF CARE MNGMENT TRAINING: CPT

## 2020-11-10 NOTE — PROGRESS NOTES
[facility]  [address]  [place], [address]  [phone] fax    Physical Therapy Discharge Summary  Patient name: Melissa Portillo Start of Care: 10/12/2020   Referral source: Diego Terrazas MD : 1955   Medical/Treatment Diagnosis: Achilles tendinitis, right leg [M76.61]  Achilles tendinitis, left leg [M76.62]  Payor: Lakshmi Knight / Plan: Samira Face HMO / Product Type: HMO /  Onset Date:progressive for about 2 months then acute on 2020     Prior Hospitalization: see medical history Provider#: [id number]   Medications: Verified on Patient Summary List    Comorbidities: HTN, BLE lymphedema  Prior Level of Function:independent with ADLs and mobility  Visits from Start of Care: 6    Missed Visits: 0  Reporting Period : 10/12/2020 to 11/10/2020      Summary of Care: Patient has received low frequency ultrasound, therapeutic exercises, patient education, and self-care education for the treatment of her achilles tendinitis. Goal: Pt to be compliant with initial HEP to improve LE flexibility and strength. Status at last note/certification: n/a  Status at discharge: met    Goal: Pt to increase right plantarflexion strength as evidence by ability to complete single RLE heel raise >/= 14 reps to negotiate stairs in community with reciprocal pattern without compensations or pain.   Status at last note/certification: 0 repetitions; 14 repetitions on left    Status at discharge: not met, 1 repetition RLE    Goal: Pt to will improve FOTO score from 51% to 65% in order to improve functional independence and mobility  Status at last note/certification:51% (01% limitation)  Status at discharge: not met (54%)    Goal: Pt to report < 5/10 pain at worst to promote quality of life  Status at last note/certification: 10/10 pain at worst - 4/10  Status at discharge: met 4/10    Goal:  Patient will improve LEFS score from 32 to 41 points in order to improve functional independence and mobility.   Status at last note/certification:  91/82  Status at discharge:  Not Met 38/80      ASSESSMENT/RECOMMENDATIONS:  Patient continues withintermittent achilles tendonitis pain to both heels, but predominantly RLE. Patient has been able to perform work duties with improved comfort and has overall had improved last few weeks of decreased achilles pain. Patient has stopped wearing 30-40 mmHg Leeann Walker stockings per PT instruction and has less discomfort with heel pain. She has been wearing her personal support tights that she purchased from [facility] and endorses good swelling management and no pain at this time. Patient has been educated on her condition, provided HEP and handouts, and educated on recommended footware and compression garments in order to manage edema and achilles pain at home independently. Patient agreeable to discharge from skilled physical therapy as she feels able to manage at home independently.      [x]Discontinue therapy: [x]Patient has reached or is progressing toward set goals      []Patient is non-compliant or has abdicated      []Due to lack of appreciable progress towards set [address] [place] [address], PT, DPT, CLT 11/10/2020 11:02 AM

## 2020-11-10 NOTE — PROGRESS NOTES
Physical Therapy Discharge Instructions      [facility]  [address]  [place], [address]  [phone] fax    Patient: Tata Aguilar  : 1955      Continue Home Exercise Program 1 times per day daily when able. Continue with    [x] Ice  as needed 1 times per day     [] Heat           Follow up with MD:     [] Upon completion of therapy     [x] As needed      Recommendations:     [x]   Return to activity with home program    []   Return to activity with the following modifications:       []Post Rehab Program    []Join Independent aquatic program     []Return to/join local gym        Additional Comments:   [facility] Contact info for future: Maria T Caro [phone]. No need to order or pay for anything from [facility] at this time. The number is just for your reference. When finances allow, you are advised to get a good pair of tennis shoes with good arch support or may try orthotic inserts. You may always see a podiatrist for orthotics. For swelling, continue to elevate legs when able. Continue to wear personal compression tights as needed. May purchase off the shelf compression stockings from stores. Look for 20-30 mmHg or \"compression\" or \"support stockings. \"     Do not wear the 30-40 mmHg Leeann Walker stockings due to pain.         Harvey Lao, PT,DPT, CLT  11/10/2020 10:53 AM

## 2020-11-10 NOTE — PROGRESS NOTES
PHYSICAL THERAPY - DAILY TREATMENT NOTE    Patient Name: Lester Garcia        Date: 11/10/2020  : 1955   YES Patient  Verified  Visit #:   6   of   7  Insurance: Payor: Karmen Otto / Plan: Saul Velez HMO / Product Type: HMO /      In time: 10:09 Out time: 11:04   Total Treatment Time: 55     Medicare Time Tracking (below)   Total Timed Codes (min):  n/a 1:1 Treatment Time:  n/a     TREATMENT AREA =  Achilles tendinitis, right leg [M76.61]  Achilles tendinitis, left leg [M76.62]    SUBJECTIVE    Pain Level (on 0 to 10 scale):  4  / 10   Medication Changes/New allergies or changes in medical history, any new surgeries or procedures? NO     If yes, update Summary List   Subjective Functional Status/Changes:  [x]  No changes reported     \"It comes and it goes. But today, you see that I am a lot better. \"          OBJECTIVE        55 min Self-care Extensive patient education on achilles tendonitis. reviewed all HEP exercises and patient verbalized understanding and denied questions. Reviewed garment instruction including to NOT wear the 30-40 mmHg [employer] Walker stockings due to pain and to continue wearing support tights that she had purchased from [address] [place] [address] [employer]. Education on support socks to purchase in the future when permitted by finances, including recommendation for 20-30 mmHg. Education on recommendation for purchasing supportive tennis shoes or inserts when permitted by finances. Provided discharge instructions to patient. Completed FOTO questionnaire with patient and LEFS. Rationale:     To promote independence with home maintenance program.     min Patient Education:  Fabian Canseco   []  Progressed/Changed HEP based on:   Patient reports compliance     Other Objective/Functional Measures:    LEFS improved from 32/80 to 38/80  FOTO improved from 51% to 54%  Patient discharged at this time as she reports improvement in achilles tendonitis pain, understanding of HEP, and is unable to proceed with any recommendations about different compression stockings, supportive footware, etc. Due to financial constraints. Post Treatment Pain Level (on 0 to 10) scale:   4  / 10     ASSESSMENT    Assessment/Changes in Function:     Patient in much better mood today with continued intermittent achilles tendonitis pain but has been able to perform work duties and has overall had improved last few weeks of achilles pain. Patient has stopped wearing 30-40 mmHg Leeann Walker stockings per PT instruction and has less discomfort. She has been wearing her personal support tights that she purchased from [employer] and endorses good swelling management and no pain. Patient has been educated on her condition, provided HEP and handouts, and educated on recommended footware and compression garments in order to manage edema and achilles pain at home independently. Patient agreeable to discharge from skilled physical therapy as she feels able to manage at home independently. []  See Progress Note/Recertification   Patient will be discharged from skilled physical therapy. Progress toward goals / Updated goals:    Short Term Goals: To be accomplished in 3 treatments:  Goal: Pt to be compliant with initial HEP to improve LE flexibility and strength. Status at last note/certification: MET     Long Term Goals: To be accomplished in 6 treatments:  Goal: Pt to increase right plantarflexion strength as evidence by ability to complete single RLE heel raise >/= 14 reps to negotiate stairs in community with reciprocal pattern without compensations or pain.   Status at last note/certification: 0 repetitions; 14 repetitions on left    Goal: Pt to will improve FOTO score from 51% to 65% in order to improve functional independence and mobility  Status at last note/certification: 51% (57% limitation) - 54% at discharge  Goal: Pt to report < 5/10 pain at worst to promote quality of life  Status at last note/certification: 10/10 pain at worst - 4/10  Goal:  Patient will improve LEFS score from 32 to 41 points in order to improve functional independence and mobility. - 38/80 LEFS     PLAN    []  Upgrade activities as tolerated YES Continue plan of care   [x]  Discharge due to : [facility] reached.   []  Other:      Therapist: Karen Navarrete    Date: 11/10/2020 Time: 10:13 AM     No future appointments.

## 2020-11-16 NOTE — TELEPHONE ENCOUNTER
Called to relay message from [facility] Yaneli Cruz Patient was unavailable. If patient calls back, please relay message.
Hpi Title: Evaluation of Skin Lesions
Patient came in to [address] [place] [address] stating she is unable to start physical therapy because both of her ankles are swollen as directed by the PT. She would also like to discuss her job duties. The list of job duties that was brought in by the patient today has been scanned in to [facility].  Patient can be reached at [phone]
Patient is currently receiving lymphedema treatment at PT as recommended by her PCP and this treatment should continue. However, she can discontinue PT for [employer] and Low Frequency Ultrasound ordered by Dr. Prateek Figueroa. I reviewed the patients work duties. Patient was already provided a work note for sedentary duty on 9/23/20 which should remain in effect until the patient is seen back in the office for follow up.      Gypsy Flaherty PA-C  9/25/2020  12:33 PM
How Severe Are Your Spot(S)?: mild
Have Your Spot(S) Been Treated In The Past?: has not been treated

## 2020-12-15 ENCOUNTER — OFFICE VISIT (OUTPATIENT)
Dept: ORTHOPEDIC SURGERY | Age: 65
End: 2020-12-15
Payer: COMMERCIAL

## 2020-12-15 VITALS
HEIGHT: 66 IN | OXYGEN SATURATION: 98 % | WEIGHT: 209 LBS | TEMPERATURE: 96.8 F | SYSTOLIC BLOOD PRESSURE: 149 MMHG | HEART RATE: 91 BPM | DIASTOLIC BLOOD PRESSURE: 79 MMHG | BODY MASS INDEX: 33.59 KG/M2 | RESPIRATION RATE: 16 BRPM

## 2020-12-15 DIAGNOSIS — M76.61 ACHILLES TENDINITIS OF BOTH LOWER EXTREMITIES: Primary | ICD-10-CM

## 2020-12-15 DIAGNOSIS — M17.11 PRIMARY OSTEOARTHRITIS OF RIGHT KNEE: ICD-10-CM

## 2020-12-15 DIAGNOSIS — M76.62 ACHILLES TENDINITIS OF BOTH LOWER EXTREMITIES: Primary | ICD-10-CM

## 2020-12-15 DIAGNOSIS — I89.0 LYMPHEDEMA OF BOTH LOWER EXTREMITIES: ICD-10-CM

## 2020-12-15 PROCEDURE — 99214 OFFICE O/P EST MOD 30 MIN: CPT | Performed by: ORTHOPAEDIC SURGERY

## 2020-12-15 NOTE — PROGRESS NOTES
AMBULATORY PROGRESS NOTE      Patient: Татьяна Haddad             MRN: [id number]     SSN: [id number] Body mass index is 33.73 kg/m². YOB: 1955     AGE: 59 y.o. EX: female    PCP: Trisha Avelar NP       IMPRESSION //  DIAGNOSIS AND TREATMENT PLAN      DIAGNOSES  1. Achilles tendinitis of both lower extremities    2. Lymphedema of both lower extremities    3. Primary osteoarthritis of right knee        No orders of the defined types were placed in this encounter. PLAN:    1. Anticipate sustained anti-inflammatory or MRI to assess for cartilage tear of right knee. 2. Continue with HEP for bilateral ankle  3. Continue wearing compression stockings for lymphedema. RTO-3 months      HPI //  [address] IS A 59 y.o. female who presents to my outpatient office for evaluation of: acute right knee pain since December 12th, 2020 and follow up for bilateral Achilles tendinitis. Patient reports spontaneous onset of medial knee pain when she stood up from the dinner table in her home. She reports intense, severe pain to the point that she had to hold onto the wall to walk. She denies any significant redness and warmth to her right knee when it happened. She presented to [facility] that same night and received xray imaging of her right knee. She was discharged with Ibuprofen, Voltaren gel, Lidoderm patch, and instructed to elevate her leg. The Lidoderm patch has given her some relief, she states. Patient denies h/o gout and diabetes. Today in the office, patient reports that the pain in her right knee has improved since the initial onset of pain. On examination, she reports no major pain with ROM or palpation. As it relates to her bilateral Achilles tendinitis, she continues to endorse persistent pain and swelling. For her ankle she has completed PT. Clinically patient presents with venous stasis.      Despite restrictions for sedentary positions at work , patient states that she has not been sitting much at work and she is mostly on her feet. Patient expresses that she would like to continue working for as long as possible. Visit Vitals  BP (!) 149/79   Pulse 91   Temp 96.8 °F (36 °C) (Temporal)   Resp 16   Ht 5' 6\" (1.676 m)   Wt 209 lb (94.8 kg)   SpO2 98%   BMI 33.73 kg/m²       Knees:  Right    Psychiatry: Alert, oriented x 3 (name,place,time of day); speech normal in context and clarity, memory intact grossly, no involuntary movements - tremors, no dementia  Gait: antalgic       Cutaneous: Skin intact, no abrasions, blisters, wounds, erythema mild swelling along patellar joint       Effusion: Is not present       Crepitus:  no PF joint crepitus       Tenderness: Tenderness: Mild Medial joint line and Mild lateral joint line       Alignment of Knee: significant valgus when standing       ROM: full range of motion// no major pain. Fullness or swelling: None to popliteal fossa region,         Stability: No instability to anterior, posterior, varus, valgus stress testing       Neuro: plantar flexion and plantar dorsiflexion, Extensor mechanism is intact      ANKLE/FOOT right and left    Gait: antalgic  Tenderness:  No tenderness . Cutaneous:   WNL. Joint Motion:  WNL. Joint / Tendon Stability: No Ankle or Subtalar instability or joint laxity. No peroneal sublux ability or dislocation  Alignment: Forefoot, Midfoot, Hindfoot WNL. Neuro Motor/Sensory: NL/NL. Vascular: NL foot/ankle pulses. Lymphatics: lower extremity lymphedema, No calf swelling, no tenderness to calf muscles. CHART REVIEW     There is no problem list on file for this patient. Melissa Portillo has been experiencing pain and discomfort confirmed as outlined in the pain assessment outlined below.       Pain Assessment  12/15/2020   Location of Pain Knee   Location Modifiers Right   Severity of Pain 0   Quality of Pain Sharp   Duration of Pain A few hours   Frequency of Pain Intermittent   Date Pain First Started -   Aggravating Factors (No Data)   Aggravating Factors Comment felt pain from sitting to standing   Limiting Behavior -   Relieving Factors Nothing   Relieving Factors Comment -   Result of Injury No        Hillary Matute  has a past medical history of Essential hypertension, benign. Patients is employed at:         Past Medical History:   Diagnosis Date    Essential hypertension, benign      History reviewed. No pertinent surgical history. Current Outpatient Medications   Medication Sig    famotidine (PEPCID) 40 mg tablet Take 1 Tab by mouth daily. I am prescribing this medication for protective measures while taking the NSAID.  triamcinolone (ARISTOCORT) 0.5 % topical cream Apply  to affected area two (2) times a day. use thin layer    triamterene-hydroCHLOROthiazide (MAXZIDE) 37.5-25 mg per tablet Take 1 Tab by mouth daily.  diclofenac (VOLTAREN) 1 % gel Apply  to affected area four (4) times daily.  ibuprofen (MOTRIN) 800 mg tablet Take 1 Tab by mouth every eight (8) hours as needed for Pain (with food).  predniSONE (STERAPRED DS) 10 mg dose pack See administration instruction per 10mg dose pack     No current facility-administered medications for this visit. No Known Allergies  Social History     Occupational History    Not on file   Tobacco Use    Smoking status: Never Smoker    Smokeless tobacco: Never Used   Substance and Sexual Activity    Alcohol use: No    Drug use: Never    Sexual activity: Not on file     Family History   Problem Relation Age of Onset    Hypertension Other        THE  FOR Hillary Matute  WAS REVIEWED BY Alonso Del Angel MD 12/15/2020 .       DIAGNOSTIC IMAGING  LAB DATA      Lab Results   Component Value Date/Time    Hemoglobin A1c 6.3 (H) 01/09/2020 10:53 AM    Hemoglobin A1c 6.5 (H) 07/16/2019 12:35 PM    //   Lab Results   Component Value Date/Time    Glucose 96 06/15/2020 11:12 AM        Lab Results   Component Value Date/Time    Hemoglobin A1c (POC) 5.5 10/10/2019 11:21 PM         No results found for: VITD3, XQVID2, XQVID3, XQVID, VD3RIA, [id number]      REVIEW OF SYSTEMS : 12/15/2020  ALL BELOW ARE Negative except : SEE HPI     CONSTITUTIONAL: No weight loss  PSYCHOLOGICAL : No Feelings of anxiety, depression, agitation  EYES: No blurred vision and no eye discharge. NO eye pain, double vision  ENT: No nasal discharge. No ear pain. CARDIOVASCULAR: No chest pain and no diaphoresis. RESPIRATORY: No cough, no hemoptysis. GI: No vomiting, no diarrhea   : No urinary frequency and no dysuria. MUSCULOSKELETAL: see HPI  SKIN: No rashes. NEURO:  No dizziness,weakness, headaches// No visual changes or confusion, or seizures,   ENDOCRINE: No polyphagia and no polydipsia. HEMATOLOGY: No bleeding tendencies. DIAGNOSTIC IMAGING          [facility] IMAGING : INTERPRETATION BY RADIOLOGIST     2020 December  KNEE COMPLETE RT 4 VIEWS12/12/2020  [facility]  Result Impression     Meniscal chondrocalcinosis. This is most often a dystrophic degenerative process but may also be due to CPPD deposition arthropathy, hypercalcemia, ochronosis, hemochromatosis, Rodney's disease, hypothyroidism or hypomagnesemia. Otherwise negative. Signed By: Lakshmi Barrientos MD on 12/12/2020 11:45 AM   Result Narrative   EXAM: RIGHT KNEE RADIOGRAPHS    CLINICAL INDICATION/HISTORY: Provided with order -   PAIN    > Additional: None    COMPARISON: None    TECHNIQUE: 4 views of the right knee    _______________    FINDINGS:    BONES: Intact and normally aligned. SOFT TISSUES: There is mild medial and moderate lateral meniscal chondrocalcinosis. No other significant soft tissue findings. No joint effusion.     _______________   Other Result Information   Interface, [employer] Rad Res - 12/12/2020 11:47 AM EST  EXAM: RIGHT KNEE RADIOGRAPHS    CLINICAL INDICATION/HISTORY: Provided with order -   PAIN    > Additional: None    COMPARISON: None    TECHNIQUE: 4 views of the right knee    _______________    FINDINGS:    BONES: Intact and normally aligned. SOFT TISSUES: There is mild medial and moderate lateral meniscal chondrocalcinosis. No other significant soft tissue findings. No joint effusion. _______________    IMPRESSION    Meniscal chondrocalcinosis. This is most often a dystrophic degenerative process but may also be due to CPPD deposition arthropathy, hypercalcemia, ochronosis, hemochromatosis, Rodney's disease, hypothyroidism or hypomagnesemia. Otherwise negative. Signed By: Celia Dubin, MD on 12/12/2020 11:45 AM   Status Results Details     Encounter Summary       I have personally reviewed the results of the above study.         Kaley Laboy MD  12/15/2020  12:57 PM

## 2021-01-04 ENCOUNTER — OFFICE VISIT (OUTPATIENT)
Dept: FAMILY MEDICINE CLINIC | Age: 66
End: 2021-01-04
Payer: COMMERCIAL

## 2021-01-04 VITALS
HEIGHT: 66 IN | RESPIRATION RATE: 18 BRPM | DIASTOLIC BLOOD PRESSURE: 94 MMHG | HEART RATE: 86 BPM | WEIGHT: 213.2 LBS | BODY MASS INDEX: 34.27 KG/M2 | SYSTOLIC BLOOD PRESSURE: 154 MMHG | TEMPERATURE: 97.7 F | OXYGEN SATURATION: 98 %

## 2021-01-04 DIAGNOSIS — M76.61 ACHILLES TENDINITIS OF BOTH LOWER EXTREMITIES: Primary | ICD-10-CM

## 2021-01-04 DIAGNOSIS — M76.62 ACHILLES TENDINITIS OF BOTH LOWER EXTREMITIES: Primary | ICD-10-CM

## 2021-01-04 PROCEDURE — 99213 OFFICE O/P EST LOW 20 MIN: CPT | Performed by: FAMILY MEDICINE

## 2021-01-04 NOTE — PROGRESS NOTES
HPI:  Jared Uribe is a 72 y.o. female who presents today with   Chief Complaint   Patient presents with    Other     Achilles tendinitis of both lower extremities         Pt has a h/o achilles tendinitis  She has significant pain in both achilles  Pain is worse on right achilles today; She is under the care of Dr. Varun Guillermo. She works from 11 pm to 9 am  She had some severe today while working; she wanted to leave work and needs a work note to excuse her for the time lost at work today. Her pain was severe today while working. She is on BP meds. She has not been able to get her BP meds recently due to cost      3 most recent PHQ Screens 1/4/2021   Little interest or pleasure in doing things Not at all   Feeling down, depressed, irritable, or hopeless Not at all   Total Score PHQ 2 0               PMH,  Meds, Allergies, Family History, Social history reviewed      Current Outpatient Medications   Medication Sig Dispense Refill    diclofenac (VOLTAREN) 1 % gel Apply  to affected area four (4) times daily. 100 g 0    ibuprofen (MOTRIN) 800 mg tablet Take 1 Tab by mouth every eight (8) hours as needed for Pain (with food). 45 Tab 0    triamcinolone (ARISTOCORT) 0.5 % topical cream Apply  to affected area two (2) times a day. use thin layer 30 g 0    triamterene-hydroCHLOROthiazide (MAXZIDE) 37.5-25 mg per tablet Take 1 Tab by mouth daily. 90 Tab 1        No Known Allergies               Review of Systems   Constitutional: Negative for chills and fever. Respiratory: Negative for shortness of breath and wheezing. Cardiovascular: Negative for chest pain and palpitations. All other systems reviewed and are negative.         Visit Vitals  BP (!) 154/94   Pulse 86   Temp 97.7 °F (36.5 °C) (Temporal)   Resp 18   Ht 5' 6\" (1.676 m)   Wt 213 lb 3.2 oz (96.7 kg)   SpO2 98%   BMI 34.41 kg/m²     Physical Exam   Visit Vitals  BP (!) 154/94   Pulse 86   Temp 97.7 °F (36.5 °C) (Temporal)   Resp 18   Ht 5' 6\" (1.676 m)   Wt 213 lb 3.2 oz (96.7 kg)   SpO2 98%   BMI 34.41 kg/m²     + TTP at achilles      Assessment/Plan:    Diagnoses and all orders for this visit:    1. Achilles tendinitis of both lower extremities      As above, not currently controlled  Work note   Continue voltaren gel which pt says helps  See Dr. Nga Buckley as scheduled  Follow-up and Dispositions    · Return if symptoms worsen or fail to improve. An After Visit Summary was printed and given to the patient. This has been fully explained to the patient, who indicates understanding.              Radha Marin MD

## 2021-01-04 NOTE — PROGRESS NOTES
Nelli Briscoe presents today for No chief complaint on file. Achilles tendinitis of both lower extremities 10/10  Is someone accompanying this pt? no    Is the patient using any DME equipment during [address]? no    Depression Screening:  3 most recent PHQ Screens 1/4/2021   Little interest or pleasure in doing things Not at all   Feeling down, depressed, irritable, or hopeless Not at all   Total Score PHQ 2 0       Learning Assessment:  Learning Assessment 1/15/2020   PRIMARY LEARNER Patient   HIGHEST LEVEL OF EDUCATION - PRIMARY LEARNER  GRADUATED HIGH SCHOOL OR GED   BARRIERS PRIMARY LEARNER NONE   CO-LEARNER CAREGIVER No   PRIMARY LANGUAGE ENGLISH   LEARNER PREFERENCE PRIMARY DEMONSTRATION   ANSWERED BY Patient   RELATIONSHIP SELF       Abuse Screening:  Abuse Screening Questionnaire 6/15/2020   Do you ever feel afraid of your partner? N   Are you in a relationship with someone who physically or mentally threatens you? N   Is it safe for you to go home? Y       Fall Risk  No flowsheet data found. ADL  No flowsheet data found. Health Maintenance reviewed and discussed and ordered per Provider. Health Maintenance Due   Topic Date Due    Hepatitis C Screening  1955    DTaP/Tdap/Td series (1 - Tdap) 12/17/1976    PAP AKA CERVICAL CYTOLOGY  12/17/1976    Shingrix Vaccine Age 50> (1 of 2) 12/17/2005    Colorectal Cancer Screening Combo  12/17/2005    Breast Cancer Screen Mammogram  01/27/2014    Flu Vaccine (1) 09/01/2020    GLAUCOMA SCREENING Q2Y  12/17/2020    Bone Densitometry (Dexa) Screening  12/17/2020    Pneumococcal 65+ years (1 of 1 - PPSV23) 12/17/2020    A1C test (Diabetic or Prediabetic)  01/09/2021   . Coordination of Care:  1. Have you been to the ER, urgent care clinic since your last visit? Hospitalized since your last visit? no    2. Have you seen or consulted any other health care providers outside of the [address] [place] [address] since your last visit?  Include any pap smears or colon screening.  no

## 2021-01-04 NOTE — LETTER
NOTIFICATION RETURN TO WORK / SCHOOL 
 
1/4/2021 12:06 PM 
 
Ms. Nelli Briscoe [address] [place] [address]   [phone] [address] [address] To Whom It May Concern: 
 
Nelli Briscoe is currently under the care of Adrienne Chen Dr. She will return to work/school on: 1/8/2020 Please excuse her for the time lost at work during this time. If there are questions or concerns please have the patient contact our office. Sincerely, Shauna Rosales MD

## 2021-01-04 NOTE — PATIENT INSTRUCTIONS
Tendon Injury (Tendinopathy): Care Instructions  Your Care Instructions     Tendons are tough, flexible tissues that connect muscle to bone. A tendon can hurt or get torn from overuse or aging, especially tendons in the shoulder, elbow, wrist, hip, knee, or ankle. Tendon injuries may be called tendinopathy or tendinitis. Tendon injuries can occur from any motion you have to repeat in a job, sports, or daily activities. Tennis elbow is one common tendon injury. You can treat most tendon problems with over-the-counter pain medicine, rest, changes in your activities, and physical therapy. Follow-up care is a key part of your treatment and safety. Be sure to make and go to all appointments, and call your doctor if you are having problems. It's also a good idea to know your test results and keep a list of the medicines you take. How can you care for yourself at home? · Rest the sore area. You may have to stop doing the activity that caused the tendon pain for a while. · Take an over-the-counter pain medicine, such as acetaminophen (Tylenol), ibuprofen (Advil, Motrin), or naproxen (Aleve). Read and follow all instructions on the label. · Do not take two or more pain medicines at the same time unless the doctor told you to. Many pain medicines have acetaminophen, which is Tylenol. Too much acetaminophen (Tylenol) can be harmful. · Put ice or a cold pack on the sore area for 10 to 20 minutes at a time. Try to do this every 1 to 2 hours for the next 3 days (when you are awake) or until any swelling goes down. Put a thin cloth between the ice and your skin. · Prop up the sore area on a pillow when you ice it or anytime you sit or lie down during the next 3 days. Try to keep it above the level of your heart. This will help reduce swelling.   · Follow your doctor's advice for wearing and caring for a sling, splint, or cast. In some cases, you may wear one of these for a while to help your tendon heal.  · Follow your doctor's advice for stretching and physical therapy. Gently move your joint through its full range of motion. This will prevent stiffness in your joint. · Go back to your activity slowly. Warm up before and stretch after the activity. You also can try making some changes. For example, if a sport caused your tendon pain, alternate the sport with another activity. If using a tool causes pain, switch hands or change your . Stop the activity if it hurts. After the activity, apply ice to prevent pain and swelling. · Do not smoke. Smoking can slow healing. If you need help quitting, talk to your doctor about stop-smoking programs and medicines. These can increase your chances of quitting for good. When should you call for help? Watch closely for changes in your health, and be sure to contact your doctor if:    · Your pain gets worse.     · You do not get better as expected. Where can you learn more? Go to http://www.gray.com/  Enter A157 in the search box to learn more about \"Tendon Injury (Tendinopathy): Care Instructions. \"  Current as of: March 2, 2020               Content Version: 12.6  © 9137-4105 [employer]. Care instructions adapted under license by [employer] (which disclaims liability or warranty for this information). If you have questions about a medical condition or this instruction, always ask your healthcare professional. [address] any warranty or liability for your use of this information.

## 2021-03-16 ENCOUNTER — OFFICE VISIT (OUTPATIENT)
Dept: ORTHOPEDIC SURGERY | Age: 66
End: 2021-03-16
Payer: COMMERCIAL

## 2021-03-16 VITALS
RESPIRATION RATE: 20 BRPM | WEIGHT: 214 LBS | TEMPERATURE: 96.4 F | BODY MASS INDEX: 34.39 KG/M2 | SYSTOLIC BLOOD PRESSURE: 163 MMHG | HEART RATE: 94 BPM | DIASTOLIC BLOOD PRESSURE: 89 MMHG | HEIGHT: 66 IN | OXYGEN SATURATION: 100 %

## 2021-03-16 DIAGNOSIS — I89.0 LYMPHEDEMA OF BOTH LOWER EXTREMITIES: ICD-10-CM

## 2021-03-16 DIAGNOSIS — M76.61 ACHILLES TENDINITIS OF BOTH LOWER EXTREMITIES: Primary | ICD-10-CM

## 2021-03-16 DIAGNOSIS — M17.11 PRIMARY OSTEOARTHRITIS OF RIGHT KNEE: ICD-10-CM

## 2021-03-16 DIAGNOSIS — M76.62 ACHILLES TENDINITIS OF BOTH LOWER EXTREMITIES: Primary | ICD-10-CM

## 2021-03-16 PROCEDURE — 99214 OFFICE O/P EST MOD 30 MIN: CPT | Performed by: ORTHOPAEDIC SURGERY

## 2021-03-16 RX ORDER — METHYLPREDNISOLONE 4 MG/1
TABLET ORAL
Qty: 1 DOSE PACK | Refills: 0 | Status: SHIPPED | OUTPATIENT
Start: 2021-03-16 | End: 2022-02-08

## 2021-03-16 RX ORDER — FAMOTIDINE 40 MG/1
40 TABLET, FILM COATED ORAL DAILY
Qty: 30 TAB | Refills: 0 | Status: SHIPPED | OUTPATIENT
Start: 2021-03-16 | End: 2021-12-16 | Stop reason: SDUPTHER

## 2021-03-16 NOTE — LETTER
NOTIFICATION 
 
3/16/2021 11:38 AM 
 
Ms. Castro Guerra [address] [place] [address]   [phone] [id number] [address] [id number] To Whom It May Concern: 
 
Castro Guerra is currently under the care of [address] [place] [address] Luis Alberto Brock. Due to her severe lymphedema and achilles tendinitis, I recommend that she seek a job with limited standing and walking. If there are questions or concerns please have the patient contact our office.  
 
 
 
Sincerely, 
 
 
Sarah Lyles MD

## 2021-03-16 NOTE — PROGRESS NOTES
AMBULATORY PROGRESS NOTE      Patient: Julio C Vázquez             MRN: [id number]     SSN: [id number] Body mass index is 34.54 kg/m². YOB: 1955     AGE: 72 y.o. EX: female    PCP: Peyman Page NP       IMPRESSION //  DIAGNOSIS AND TREATMENT PLAN        Julio C Vázquez has severe lymphedema in her bilateral LEs and achilles tendinitis. DIAGNOSES  1. Achilles tendinitis of both lower extremities    2. Lymphedema of both lower extremities    3. Primary osteoarthritis of right knee        Orders Placed This Encounter    methylPREDNISolone (MEDROL DOSEPACK) 4 mg tablet     Sig: Per dose pack instructions     Dispense:  1 Dose Pack     Refill:  0    famotidine (PEPCID) 40 mg tablet     Sig: Take 1 Tab by mouth daily. Dispense:  30 Tab     Refill:  0        PLAN:    1. I will provide a note and recommend she quits one of her jobs so she can spend less time on her feet  2. I will advise she see her PCP to help manage her BP  3. I will recommend she seek OTC Voltaren gel  4. I will recommend she continue using her compression stockings  5. I will prescribe a MDP and Pepcid      RTO-  3 weeks    Julio C Vázquez  expresses understanding of the diagnosis, treatment plan, and all of their proposed questions were answered to their satisfaction. Patient education has been provided re the diagnoses. Julio C Vázquez may have a reminder for a \"due or due soon\" health maintenance. I have asked that she contact her primary care provider for follow-up on this health maintenance. HPI //  OBJECTIVE EXAMINATION        Julio C Vázquez IS A 72 y.o. female who is a/an  established patient , presenting to my outpatient office for evaluation of  the following chief complaint(s):     Chief Complaint   Patient presents with    Foot Pain     bilateral foot pain       Pain has been present since December 12th, 2020 and follow up for bilateral Achilles tendinitis.   In the past the patient reported spontaneous onset of medial knee pain when she stood up from the dinner table in her home. She reported intense, severe pain to the point that she had to hold onto the wall to walk. She denied any significant redness and warmth to her right knee when it happened. [facility] and received xray imaging of her right knee. She was discharged with Ibuprofen, Voltaren gel, Lidoderm patch, and instructed to elevate her leg. The Lidoderm patch gave her some relief, she stated. Patient denies h/o gout and diabetes.       At [facility] patient reported that the pain in her right knee has improved since the initial onset of pain. On examination, she reported no major pain with ROM or palpation.      As it relates to her bilateral Achilles tendinitis, she continues to endorse persistent pain and swelling. For her ankle she has completed PT. Clinically patient presents with venous stasis.      Despite restrictions for sedentary positions at work , patient states that she has not been sitting much at work and she is mostly on her feet. Patient expresses that she would like to continue working for as long as possible. I anticipated sustained anti-inflammatory or MRI to assess for cartilage tear of right knee, advised her to continue with HEP for bilateral ankles, and advised her to continue wearing compression stockings for lymphedema. Since LOV she felt a burning pain in her achilles. 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  She does not wish to continue working 2 jobs, but she has to because of financial stress.       Visit Vitals  BP (!) 163/89 (BP 1 Location: Right upper arm, BP Patient Position: Sitting, BP Cuff Size: Large adult)   Pulse 94   Temp (!) 96.4 °F (35.8 °C) (Temporal)   Resp 20   Ht 5' 6\" (1.676 m)   Wt 214 lb (97.1 kg)   SpO2 100%   BMI 34.54 kg/m²       Appearance: Alert, well appearing and pleasant patient who is in no distress, oriented to person, place/time, and who follows commands. This patient is accompanied in the examination room by her  self. There is signs of: no dementia  Psychiatric: Affect/mood are appropriate. Speech normal in context and clarity, memory intact grossly, no involuntary movements - tremors. Patient arrives to office via: without assistive device. H EENT (2): Head normocephalic & atraumatic. Both pupils are round     Eye: EOM are intact // Neck: ROM WNL  //  Hearings Intact   Respiratory: Breathing non labored     ANKLE/FOOT bilateral     Gait: normal  Tenderness: mild RT distal achilles with swelling. Cutaneous: brawny discoloration in both LEs. No redness or heat. Joint Motion:   WNL  Joint / Tendon Stability: No Ankle or Subtalar instability or joint laxity. No peroneal sublux ability or dislocation  Alignment: neutral Hindfoot,    Neuro Motor/Sensory: NL/NL  Vascular: NL foot/ankle pulses,   Lymphatics: No extremity lymphedema, No calf swelling, no tenderness to calf muscles. CHART REVIEW     Gisela Fiore has been experiencing pain and discomfort confirmed as outlined in the pain assessment outlined below.  was reviewed by Daniel Joiner MD on 3/16/2021. Pain Assessment  3/16/2021   Location of Pain Foot   Location Modifiers Left;Right   Severity of Pain 9   Quality of Pain Sharp;Burning   Quality of Pain Comment pain from ankle to knee   Duration of Pain Persistent   Frequency of Pain Intermittent   Date Pain First Started -   Aggravating Factors Walking;Standing   Aggravating Factors Comment -   Limiting Behavior Some   Relieving Factors Rest   Relieving Factors Comment -   Result of Injury Milly Fiore  has a past medical history of Essential hypertension, benign.      Patients is employed at:         Past Medical History: Diagnosis Date    Essential hypertension, benign      History reviewed. No pertinent surgical history. Current Outpatient Medications   Medication Sig    methylPREDNISolone (MEDROL DOSEPACK) 4 mg tablet Per dose pack instructions    famotidine (PEPCID) 40 mg tablet Take 1 Tab by mouth daily.  triamcinolone (ARISTOCORT) 0.5 % topical cream Apply  to affected area two (2) times a day. use thin layer    triamterene-hydroCHLOROthiazide (MAXZIDE) 37.5-25 mg per tablet Take 1 Tab by mouth daily.  diclofenac (VOLTAREN) 1 % gel Apply  to affected area four (4) times daily.  ibuprofen (MOTRIN) 800 mg tablet Take 1 Tab by mouth every eight (8) hours as needed for Pain (with food). No current facility-administered medications for this visit. No Known Allergies  Social History     Occupational History    Not on file   Tobacco Use    Smoking status: Never Smoker    Smokeless tobacco: Never Used   Substance and Sexual Activity    Alcohol use: No    Drug use: Never    Sexual activity: Not on file     Family History   Problem Relation Age of Onset    Hypertension Other         DIAGNOSTIC LAB DATA      Lab Results   Component Value Date/Time    Hemoglobin A1c 6.3 (H) 01/09/2020 10:53 AM    Hemoglobin A1c 6.5 (H) 07/16/2019 12:35 PM    //   Lab Results   Component Value Date/Time    Glucose 96 06/15/2020 11:12 AM        Lab Results   Component Value Date/Time    Hemoglobin A1c (POC) 5.5 10/10/2019 11:21 PM         No results found for: Wendy Angulo, XQVID3, XQVID, VD3RIA, [id number]      REVIEW OF SYSTEMS : 3/16/2021  ALL BELOW ARE Negative except : SEE HPI     All other systems reviewed and are negative. 12 point review of systems otherwise negative unless noted in HPI. DIAGNOSTIC IMAGING           I have reviewed the results of the above study. The interpretation of this study is my professional opinion.                 On this date 03/16/2021 I have spent 30 minutes reviewing previous notes, test results and face to face with the patient discussing the diagnosis and importance of compliance with the treatment plan as well as documenting on the day of the visit. An electronic signature was used to authenticate this note. Disclaimer: Sections of this note are dictated using utilizing voice recognition software, which may have resulted in some phonetic based errors in grammar and contents. Even though attempts were made to correct all the mistakes, some may have been missed, and remained in the body of the document. If questions arise, please contact our department.      Essence Washington, as dictated by Katia Ortega MD  3/16/2021  7:59 AM

## 2021-03-29 RX ORDER — TRIAMTERENE/HYDROCHLOROTHIAZID 37.5-25 MG
TABLET ORAL
Qty: 90 TAB | Refills: 1 | Status: SHIPPED | OUTPATIENT
Start: 2021-03-29 | End: 2021-11-03

## 2021-04-07 ENCOUNTER — OFFICE VISIT (OUTPATIENT)
Dept: ORTHOPEDIC SURGERY | Age: 66
End: 2021-04-07
Payer: COMMERCIAL

## 2021-04-07 VITALS
RESPIRATION RATE: 16 BRPM | DIASTOLIC BLOOD PRESSURE: 75 MMHG | HEART RATE: 88 BPM | HEIGHT: 66 IN | OXYGEN SATURATION: 98 % | WEIGHT: 207.2 LBS | TEMPERATURE: 98.6 F | SYSTOLIC BLOOD PRESSURE: 135 MMHG | BODY MASS INDEX: 33.3 KG/M2

## 2021-04-07 DIAGNOSIS — M76.61 ACHILLES TENDINITIS OF BOTH LOWER EXTREMITIES: ICD-10-CM

## 2021-04-07 DIAGNOSIS — M76.62 ACHILLES TENDINITIS OF BOTH LOWER EXTREMITIES: ICD-10-CM

## 2021-04-07 DIAGNOSIS — I89.0 LYMPHEDEMA OF BOTH LOWER EXTREMITIES: ICD-10-CM

## 2021-04-07 DIAGNOSIS — M67.971 DISORDER OF RIGHT ACHILLES TENDON: Primary | ICD-10-CM

## 2021-04-07 PROCEDURE — 99214 OFFICE O/P EST MOD 30 MIN: CPT | Performed by: ORTHOPAEDIC SURGERY

## 2021-04-07 NOTE — PROGRESS NOTES
AMBULATORY PROGRESS NOTE      Patient: Lori Gupta             MRN: [id number]     SSN: [id number] Body mass index is 33.44 kg/m². YOB: 1955     AGE: 72 y.o. EX: female    PCP: Serjio Quezada NP       IMPRESSION //  DIAGNOSIS AND TREATMENT PLAN        Lori Gupta has continued pain discomfort to the right Achilles tendon at the medial lateral hindfoot, as well as at the insertional portion  Insertional portion right distal Achilles tendon. Because her continued pain discomfort, recognition, is for MRI of her right ankle assess the right Achilles tendon. DIAGNOSES  1. Disorder of right Achilles tendon    2. Achilles tendinitis of both lower extremities    3. Lymphedema of both lower extremities        Orders Placed This Encounter    MRI ANKLE RT WO CONT     Standing Status:   Future     Standing Expiration Date:   5/7/2022     Scheduling Instructions:      Please call her brothers(Noe) number at [phone]     Order Specific Question:   Arthrogram study     Answer:   No     Order Specific Question:   Reason for Exam     Answer:   evaluate achilles tendon        PLAN:    1.  MRI, right Achilles tendon, assess distal Achilles tendon, calcaneal tuberosity insertion site. 2.  Activity modification      RTO-return after MRI    Lori Gupta  expresses understanding of the diagnosis, treatment plan, and all of their proposed questions were answered to their satisfaction. Patient education has been provided re the diagnoses. Lori Gupta may have a reminder for a \"due or due soon\" health maintenance. I have asked that she contact her primary care provider for follow-up on this health maintenance.         HPI //  OBJECTIVE EXAMINATION        Anju Paniagua IS A 72 y.o. female who is a/an  established patient, presenting to my outpatient office for evaluation of  the following chief complaint(s):     Chief Complaint   Patient presents with    Foot Pain right       She is to have pain discomfort to the right insertional nonexertional portion right calcaneal tuberosity. Denies any fever shakes chills night sweats or any history of trauma. She is tried formal physical therapy, still having pain discomfort. Patient is upset, with her her PCP, and explains to me that her PCP M had not was able to see her last week for hypertension. Patient describes having blood pressure 174/108 she states that she tried to see her PCP with it but was unable to see her PCP. She went to [facility] emergency room where she was received treatment for hypertension. She normally takes triamterene hydrochlorothiazide 37.5/25 mg tablets. She continues have a pain discomfort right hyper long Achilles tendon has been a stasis disease lymphedema, and bilateral lower extremity venous discoloration in the supramalleolar region of each ankle unchanged from her previous examination. Visit Vitals  /75 (BP 1 Location: Right arm)   Pulse 88   Temp 98.6 °F (37 °C)   Resp 16   Ht 5' 6\" (1.676 m)   Wt 207 lb 3.2 oz (94 kg)   SpO2 98%   BMI 33.44 kg/m²       Appearance: Alert, well appearing and pleasant patient who is in no distress, oriented to person, place/time, and who follows commands. This patient is accompanied in the examination room by her  self. There is signs of: no dementia  Psychiatric: Affect/mood are appropriate. Speech normal in context and clarity, memory intact grossly, no involuntary movements - tremors. Patient arrives to office via: with assistive device: cane  ANKIT PLATTNT (2): Head normocephalic & atraumatic. Both pupils are round     Eye: EOM are intact // Neck: ROM WNL  //  Hearings Intact   Respiratory: Breathing non labored     ANKLE/FOOT right and left     Gait: antalgic  Tenderness: Tender at insertion point of right Achilles tendon nontender left side. Cutaneous:    Brawny discoloration, left suprahilar region of each ankle  Joint Motion:  WNL.   Joint / Tendon Stability: No Ankle or Subtalar instability or joint laxity. No peroneal sublux ability or dislocation  Alignment: Forefoot, Midfoot, Hindfoot WNL. Neuro Motor/Sensory: NL/NL. Vascular: NL foot/ankle pulses. Lymphatics: lower extremity lymphedema, No calf swelling, no tenderness to calf muscles. CHART REVIEW     Ritchie Castillo has been experiencing pain and discomfort confirmed as outlined in the pain assessment outlined below.  was reviewed by Carolin Martin MD on 4/7/2021. Pain Assessment  4/7/2021   Location of Pain Foot   Location Modifiers Right   Severity of Pain 8   Quality of Pain Burning;Aching   Quality of Pain Comment -   Duration of Pain Persistent   Frequency of Pain Constant   Date Pain First Started -   Aggravating Factors Walking;Standing   Aggravating Factors Comment -   Limiting Behavior Yes   Relieving Factors -   Relieving Factors Comment -   Result of Injury No        Ritchie Castillo  has a past medical history of Essential hypertension, benign. Patients is employed at:         Past Medical History:   Diagnosis Date    Essential hypertension, benign      History reviewed. No pertinent surgical history. Current Outpatient Medications   Medication Sig    triamterene-hydroCHLOROthiazide (MAXZIDE) 37.5-25 mg per tablet TAKE 1 TABLET BY MOUTH EVERY DAY    methylPREDNISolone (MEDROL DOSEPACK) 4 mg tablet Per dose pack instructions    famotidine (PEPCID) 40 mg tablet Take 1 Tab by mouth daily.  triamcinolone (ARISTOCORT) 0.5 % topical cream Apply  to affected area two (2) times a day. use thin layer    diclofenac (VOLTAREN) 1 % gel Apply  to affected area four (4) times daily.  ibuprofen (MOTRIN) 800 mg tablet Take 1 Tab by mouth every eight (8) hours as needed for Pain (with food). No current facility-administered medications for this visit.       No Known Allergies  Social History     Occupational History    Not on file   Tobacco Use    Smoking status: Never Smoker    Smokeless tobacco: Never Used   Substance and Sexual Activity    Alcohol use: No    Drug use: Never    Sexual activity: Not on file     Family History   Problem Relation Age of Onset    Hypertension Other         DIAGNOSTIC LAB DATA      Lab Results   Component Value Date/Time    Hemoglobin A1c 6.3 (H) 01/09/2020 10:53 AM    Hemoglobin A1c 6.5 (H) 07/16/2019 12:35 PM    //   Lab Results   Component Value Date/Time    Glucose 96 06/15/2020 11:12 AM        Lab Results   Component Value Date/Time    Hemoglobin A1c (POC) 5.5 10/10/2019 11:21 PM         No results found for: Cozette Grippe, XQVID3, XQVID, VD3RIA, [id number]      REVIEW OF SYSTEMS : 4/7/2021  ALL BELOW ARE Negative except : SEE HPI     All other systems reviewed and are negative. 12 point review of systems otherwise negative unless noted in HPI. DIAGNOSTIC IMAGING /ORDERS      none to day      I have reviewed the results of the above study. The interpretation of this study is my professional opinion. On this date 04/07/2021 I have spent 35 minutes reviewing previous notes, test results and face to face with the patient discussing the diagnosis and importance of compliance with the treatment plan as well as documenting on the day of the visit. An electronic signature was used to authenticate this note. Elena Rosado MD  4/7/2021  12:33 PM      Disclaimer: Sections of this note are dictated using utilizing voice recognition software, which may have resulted in some phonetic based errors in grammar and contents. Even though attempts were made to correct all the mistakes, some may have been missed, and remained in the body of the document. If questions arise, please contact our department.      Elena Rosado MD  4/7/2021  12:33 PM

## 2021-04-14 DIAGNOSIS — M67.971 DISORDER OF RIGHT ACHILLES TENDON: ICD-10-CM

## 2021-04-14 DIAGNOSIS — M76.61 ACHILLES TENDINITIS OF BOTH LOWER EXTREMITIES: ICD-10-CM

## 2021-04-14 DIAGNOSIS — M76.62 ACHILLES TENDINITIS OF BOTH LOWER EXTREMITIES: ICD-10-CM

## 2021-04-20 ENCOUNTER — OFFICE VISIT (OUTPATIENT)
Dept: ORTHOPEDIC SURGERY | Age: 66
End: 2021-04-20
Payer: COMMERCIAL

## 2021-04-20 VITALS
OXYGEN SATURATION: 100 % | HEART RATE: 88 BPM | WEIGHT: 208.2 LBS | TEMPERATURE: 97.5 F | HEIGHT: 66 IN | BODY MASS INDEX: 33.46 KG/M2

## 2021-04-20 DIAGNOSIS — R60.0 EDEMA OF LEFT LOWER LEG: Primary | ICD-10-CM

## 2021-04-20 DIAGNOSIS — M79.662 PAIN IN LEFT LOWER LEG: ICD-10-CM

## 2021-04-20 DIAGNOSIS — M67.971 DISORDER OF RIGHT ACHILLES TENDON: ICD-10-CM

## 2021-04-20 PROCEDURE — 73590 X-RAY EXAM OF LOWER LEG: CPT | Performed by: ORTHOPAEDIC SURGERY

## 2021-04-20 PROCEDURE — 99213 OFFICE O/P EST LOW 20 MIN: CPT | Performed by: ORTHOPAEDIC SURGERY

## 2021-04-20 NOTE — PROGRESS NOTES
AMBULATORY PROGRESS NOTE      Patient: Anika Sanchez             MRN: [id number]     SSN: [id number] Body mass index is 33.6 kg/m². YOB: 1955     AGE: 72 y.o. EX: female    PCP: Charli Mcdonald NP       IMPRESSION //  DIAGNOSIS AND TREATMENT PLAN        Anika Sanchez has a small superficial area of swelling, or fullness to the anterior portion of her tibia or lower leg. It is centered over anterior compartment it is painless. She notices today. So slight fullness to the anterior portion of her proximal one third lower leg just, lateral to the tibial crest she notices after taking a shower. She has venous stasis disease to her bilateral extremities and skin discoloration. She also has a right Achilles noninsertional tendinitis. Quite tearful, during her evaluation. DIAGNOSES  1. Edema of left lower leg    2. Pain in left lower leg    3. Disorder of right Achilles tendon        Orders Placed This Encounter    [[id number]] Tib/Fib 2V     Order Specific Question:   Reason for Exam     Answer:   pain    US EXT NONVAS LT COMP     Standing Status:   Future     Standing Expiration Date:   5/20/2022     Order Specific Question:   Specific Body Part     Answer:   left tibfib     Order Specific Question:   Reason for Exam     Answer:   evaluate left anterior tibia        PLAN:    1. I will obtain a 2-view XR of left tib/fib in the office today. 2. I will order a diagnostic ultrasound of her left anterior tibia. Assess fullness, to this region, please demarcate this area. 2. I anticipate ordering a MRI of left tibia if nothing is revealed on the ultrasound. RTO-       Anika Sanchez  expresses understanding of the diagnosis, treatment plan, and all of their proposed questions were answered to their satisfaction. Patient education has been provided re the diagnoses. Anika Sanchez may have a reminder for a \"due or due soon\" health maintenance.  I have asked that she contact her primary care provider for follow-up on this health maintenance. HPI //  OBJECTIVE EXAMINATION        Anju Davis IS A 72 y.o. female who is a/an  established patient, presenting to my outpatient office for evaluation of  the following chief complaint(s):     Chief Complaint   Patient presents with    Leg Pain     left leg     In the past she had no pain discomfort to the right insertional nonexertional portion right calcaneal tuberosity. Denied any fever shakes chills night sweats or any history of trauma. She tried formal physical therapy, but still had pain discomfort.     Patient was upset, with her her PCP, and explained to me that her PCP was not able to see her last week for hypertension. Patient described having blood pressure 174/108 she stated that she tried to see her PCP with it but was unable to see her PCP. She went to [facility] emergency room where she was received treatment for hypertension.     She normally was taking triamterene hydrochlorothiazide 37.5/25 mg tablets.     She continued to have pain discomfort right hyper long Achilles tendon has been a stasis disease lymphedema, and bilateral lower extremity venous discoloration in the supramalleolar region of each ankle unchanged from her previous examination. Since LOV pt recalls leaving her job early this morning to go home. After getting out of the shower, she noticed a knot on her left leg accompanied with no pain. She has a scheduled MRI of her R ankle tomorrow, 4/21/2021. Visit Vitals  Pulse 88   Temp 97.5 °F (36.4 °C) (Temporal)   Ht 5' 6\" (1.676 m)   Wt 208 lb 3.2 oz (94.4 kg)   SpO2 100%   BMI 33.60 kg/m²       Appearance: Alert, well appearing and pleasant patient who is in no distress, oriented to person, place/time, and who follows commands. This patient is accompanied in the examination room by her  self. There is signs of: no dementia  Psychiatric: Affect/mood are appropriate. Speech normal in context and clarity, memory intact grossly, no involuntary movements - tremors. Patient arrives to office via: without assistive device:    H EENT (2): Head normocephalic & atraumatic. Both pupils are round     Eye: EOM are intact // Neck: ROM WNL  //  Hearings Intact   Respiratory: Breathing non labored     ANKLE/FOOT bilateral     Gait: slow  Tenderness: mild right Achilles tendon noninsertional portion, nontender to left tib-fib regions, and the slight fullness to the anterior portion of her left proximal tib-fib region  Cutaneous: slight fullness to 1/3 of anterior proximal of left tibia, discoloration of left lower leg  Joint Motion: Left knee, and ankle range of motion are WNL    Joint / Tendon Stability: No Ankle or Subtalar instability or joint laxity. No peroneal sublux ability or dislocation  Alignment: neutral Hindfoot,    Neuro Motor/Sensory: NL/NL  Vascular: NL foot/ankle pulses,   Lymphatics: No extremity lymphedema, No calf swelling, no tenderness to calf muscles. CHART REVIEW     Az Barrientos has been experiencing pain and discomfort confirmed as outlined in the pain assessment outlined below.  was reviewed by Jamila Mai MD on 4/20/2021. Pain Assessment  4/20/2021   Location of Pain Leg   Location Modifiers Left   Severity of Pain 10   Quality of Pain -   Quality of Pain Comment -   Duration of Pain A few minutes   Frequency of Pain Intermittent   Date Pain First Started -   Aggravating Factors Walking   Aggravating Factors Comment -   Limiting Behavior Yes   Relieving Factors Elevation   Relieving Factors Comment -   Result of Injury No        Az Barrientos  has a past medical history of Essential hypertension, benign. Patients is employed at:         Past Medical History:   Diagnosis Date    Essential hypertension, benign      History reviewed. No pertinent surgical history.   Current Outpatient Medications   Medication Sig  triamterene-hydroCHLOROthiazide (MAXZIDE) 37.5-25 mg per tablet TAKE 1 TABLET BY MOUTH EVERY DAY    methylPREDNISolone (MEDROL DOSEPACK) 4 mg tablet Per dose pack instructions    famotidine (PEPCID) 40 mg tablet Take 1 Tab by mouth daily.  triamcinolone (ARISTOCORT) 0.5 % topical cream Apply  to affected area two (2) times a day. use thin layer    diclofenac (VOLTAREN) 1 % gel Apply  to affected area four (4) times daily.  ibuprofen (MOTRIN) 800 mg tablet Take 1 Tab by mouth every eight (8) hours as needed for Pain (with food). No current facility-administered medications for this visit. No Known Allergies  Social History     Occupational History    Not on file   Tobacco Use    Smoking status: Never Smoker    Smokeless tobacco: Never Used   Substance and Sexual Activity    Alcohol use: No    Drug use: Never    Sexual activity: Not on file     Family History   Problem Relation Age of Onset    Hypertension Other         DIAGNOSTIC LAB DATA      Lab Results   Component Value Date/Time    Hemoglobin A1c 6.3 (H) 01/09/2020 10:53 AM    Hemoglobin A1c 6.5 (H) 07/16/2019 12:35 PM    //   Lab Results   Component Value Date/Time    Glucose 96 06/15/2020 11:12 AM        Lab Results   Component Value Date/Time    Hemoglobin A1c (POC) 5.5 10/10/2019 11:21 PM         No results found for: Marilee Abdalla, XQVID3, XQVID, VD3RIA, [id number]      REVIEW OF SYSTEMS : 4/20/2021  ALL BELOW ARE Negative except : SEE HPI     All other systems reviewed and are negative. 12 point review of systems otherwise negative unless noted in HPI. DIAGNOSTIC IMAGING /ORDERS     X RAYS AT [address] [place] [address]  4/20/2021    Left TIBIA FIBULA       Bones: No fractures, subluxations, or dislocations  Alignment: Normal  Soft Tissues: Normal  Mineralization: suggests no Osteopenia      I have personally reviewed the images of the above study. The interpretation of this study is my professional opinion. On this date 04/20/2021 I have spent 35 minutes reviewing previous notes, test results and face to face with the patient discussing the diagnosis and importance of compliance with the treatment plan as well as documenting on the day of the visit. An electronic signature was used to authenticate this note. Disclaimer: Sections of this note are dictated using utilizing voice recognition software, which may have resulted in some phonetic based errors in grammar and contents. Even though attempts were made to correct all the mistakes, some may have been missed, and remained in the body of the document. If questions arise, please contact our department.      Cruz Gutiérrez, as dictated by Westley Mena MD  4/20/2021  11:08 AM

## 2021-04-21 ENCOUNTER — HOSPITAL ENCOUNTER (OUTPATIENT)
Dept: MRI IMAGING | Age: 66
Discharge: HOME | End: 2021-04-21
Attending: ORTHOPAEDIC SURGERY

## 2021-04-21 PROCEDURE — 73721 MRI JNT OF LWR EXTRE W/O DYE: CPT

## 2021-04-27 ENCOUNTER — TELEPHONE (OUTPATIENT)
Dept: ORTHOPEDIC SURGERY | Age: 66
End: 2021-04-27

## 2021-04-27 DIAGNOSIS — R60.0 EDEMA OF LEFT LOWER LEG: ICD-10-CM

## 2021-04-27 DIAGNOSIS — M79.662 PAIN IN LEFT LOWER LEG: ICD-10-CM

## 2021-04-27 NOTE — TELEPHONE ENCOUNTER
Patients order, demographics and office note were faxed to [facility] requesting appt   Tel# [phone] fax# [phone]

## 2021-04-27 NOTE — TELEPHONE ENCOUNTER
David [facility]  ultrasound department called to advise the provider that  [facility] does not do [facility] US and the patient will need to try a different facility.

## 2021-04-28 RX ORDER — TRIAMTERENE/HYDROCHLOROTHIAZID 37.5-25 MG
TABLET ORAL
Qty: 90 TAB | Refills: 1 | Status: CANCELLED | OUTPATIENT
Start: 2021-04-28

## 2021-04-28 NOTE — TELEPHONE ENCOUNTER
Requested Prescriptions     Pending Prescriptions Disp Refills    triamterene-hydroCHLOROthiazide (MAXZIDE) 37.5-25 mg per tablet 90 Tab 1

## 2021-05-03 ENCOUNTER — OFFICE VISIT (OUTPATIENT)
Dept: ORTHOPEDIC SURGERY | Age: 66
End: 2021-05-03
Payer: COMMERCIAL

## 2021-05-03 VITALS
HEIGHT: 66 IN | WEIGHT: 208 LBS | BODY MASS INDEX: 33.43 KG/M2 | OXYGEN SATURATION: 99 % | HEART RATE: 91 BPM | RESPIRATION RATE: 16 BRPM

## 2021-05-03 DIAGNOSIS — M25.532 LEFT WRIST PAIN: ICD-10-CM

## 2021-05-03 DIAGNOSIS — M67.432 GANGLION CYST OF DORSUM OF LEFT WRIST: Primary | ICD-10-CM

## 2021-05-03 PROCEDURE — 73110 X-RAY EXAM OF WRIST: CPT | Performed by: ORTHOPAEDIC SURGERY

## 2021-05-03 PROCEDURE — 20612 ASPIRATE/INJ GANGLION CYST: CPT | Performed by: ORTHOPAEDIC SURGERY

## 2021-05-03 PROCEDURE — 99214 OFFICE O/P EST MOD 30 MIN: CPT | Performed by: ORTHOPAEDIC SURGERY

## 2021-05-03 NOTE — PROGRESS NOTES
Julio Osborne is a 72 y.o. female right handed retiree. Worker's Compensation and legal considerations: none filed. Vitals:    05/03/21 1100   Pulse: 91   Resp: 16   SpO2: 99%   Weight: 208 lb (94.3 kg)   Height: 5' 6\" (1.676 m)   PainSc:   0 - No pain   PainLoc: Wrist           Chief Complaint   Patient presents with    Wrist Pain     left wrist         HPI: Patient presents today with complaints of left wrist pain and mass over the back of her wrist.  She reports something similar on the right side that was removed years ago. Date of onset: Indeterminate    Injury: No    Prior Treatment:  No    Numbness/ Tingling: No      ROS: Review of Systems - General ROS: negative  Psychological ROS: negative  ENT ROS: negative  Allergy and Immunology ROS: negative  Hematological and Lymphatic ROS: negative  Respiratory ROS: no cough, shortness of breath, or wheezing  Cardiovascular ROS: no chest pain or dyspnea on exertion  Gastrointestinal ROS: no abdominal pain, change in bowel habits, or black or bloody stools  Musculoskeletal ROS: positive for - pain in wrist - left  Neurological ROS: negative  Dermatological ROS: negative    Past Medical History:   Diagnosis Date    Essential hypertension, benign        History reviewed. No pertinent surgical history. Current Outpatient Medications   Medication Sig Dispense Refill    triamterene-hydroCHLOROthiazide (MAXZIDE) 37.5-25 mg per tablet TAKE 1 TABLET BY MOUTH EVERY DAY 90 Tab 1    methylPREDNISolone (MEDROL DOSEPACK) 4 mg tablet Per dose pack instructions 1 Dose Pack 0    famotidine (PEPCID) 40 mg tablet Take 1 Tab by mouth daily. 30 Tab 0    triamcinolone (ARISTOCORT) 0.5 % topical cream Apply  to affected area two (2) times a day. use thin layer 30 g 0    diclofenac (VOLTAREN) 1 % gel Apply  to affected area four (4) times daily. 100 g 0    ibuprofen (MOTRIN) 800 mg tablet Take 1 Tab by mouth every eight (8) hours as needed for Pain (with food).  45 Tab 0 Current Facility-Administered Medications   Medication Dose Route Frequency Provider Last Rate Last Admin    triamcinolone acetonide (KENALOG) 10 mg/mL injection 5 mg  5 mg Other ONCE Emery Stevens, DO           No Known Allergies        PE:     Physical Exam  Vitals signs and nursing note reviewed. Constitutional:       General: She is not in acute distress. Appearance: Normal appearance. She is not ill-appearing. Neck:      Musculoskeletal: Normal range of motion and neck supple. Cardiovascular:      Pulses: Normal pulses. Pulmonary:      Effort: Pulmonary effort is normal.   Musculoskeletal: Normal range of motion. General: Swelling present. No tenderness, deformity or signs of injury. Right lower leg: No edema. Left lower leg: No edema. Skin:     General: Skin is warm and dry. Findings: No bruising or erythema. Neurological:      General: No focal deficit present. Mental Status: She is alert and oriented to person, place, and time. Psychiatric:         Mood and Affect: Mood normal.         Behavior: Behavior normal.            Wrist:    Tenderness L R Test L R   1st Ext Comp - - Finkelstein's - -   Snuff Box - - Galloway - -   2nd Ext Comp - - S-L Shear - -   S-L Joint - - L-T Shear - -   L-T Joint - - DRUJ Sup - -   6th Ext Comp - - DRUJ Pro - -   Ulnar Snuff - - DRUJ Grind - -   Fovea - - TFCC - -   STT Joint - - Mid-Carp Inst - -   FCR - - P-T Grind - -   Intersection - - ECU Sublux. - -      Dorsal Ganglion: + 0.5 mm left   Volar Ganglion: -      ROM: Full        Imagin/3/2021 3 views of left wrist does not show any fracture dislocation or any other osseous abnormal days. ICD-10-CM ICD-9-CM    1. Ganglion cyst of dorsum of left wrist  M67.432 727.41 ASPIRAT/INJECTION GANGLION CYST(S)      triamcinolone acetonide (KENALOG) 10 mg/mL injection 5 mg   2.  Left wrist pain  M25.532 719.43 AMB POC XRAY, WRIST; COMPLETE, 3+ VIE         Plan:     Left wrist dorsal ganglion injection and decompression. Follow-up and Dispositions    · Return if symptoms worsen or fail to improve. Plan was reviewed with patient, who verbalized agreement and understanding of the plan    2042 [facility] NOTE        Chart reviewed for the following:   Emery ARMENDARIZ DO, have reviewed the History, Physical and updated the Allergic reactions for [facility] performed immediately prior to start of procedure:   Emery ARMENDARIZ DO, have performed the following reviews on Albetr Rico prior to the start of the procedure:            * Patient was identified by name and date of birth   * Agreement on procedure being performed was verified  * Risks and Benefits explained to the patient  * Procedure site verified and marked as necessary  * Patient was positioned for comfort  * Consent was signed and verified     Time: 11:17 AM      Date of procedure: 5/3/2021    Procedure performed by: Arely Jeffries DO    Provider assisted by: Mackenzie GOINS    Patient assisted by: self    How tolerated by patient: tolerated the procedure well with no complications    Post Procedural Pain Scale: 0 - No Hurt    Comments: none    Procedure:  After consent was obtained, using sterile technique the ganglion was prepped. Local anesthetic used: 1% lidocaine. Kenalog 5 mg and was then injected and the needle withdrawn. The procedure was well tolerated. The patient is asked to continue to rest the area for a few more days before resuming regular activities. It may be more painful for the first 1-2 days. Watch for fever, or increased swelling or persistent pain in the joint. Call or return to clinic prn if such symptoms occur or there is failure to improve as anticipated.

## 2021-05-03 NOTE — LETTER
NOTIFICATION RETURN TO WORK / SCHOOL 
 
5/3/2021 11:34 AM 
 
Ms. Steven [address] [address] [address] [address] To Whom It May Concern: 
 
Steven Moran is currently under the care of [address] [place] [address] Luis Alberto Brock. She was seen in our office today. If there are questions or concerns please have the patient contact our office. Sincerely, Ashanti Mora DO

## 2021-05-04 ENCOUNTER — OFFICE VISIT (OUTPATIENT)
Dept: ORTHOPEDIC SURGERY | Age: 66
End: 2021-05-04
Payer: COMMERCIAL

## 2021-05-04 VITALS
RESPIRATION RATE: 16 BRPM | HEART RATE: 81 BPM | TEMPERATURE: 96.3 F | BODY MASS INDEX: 33.59 KG/M2 | OXYGEN SATURATION: 99 % | WEIGHT: 209 LBS | HEIGHT: 66 IN

## 2021-05-04 DIAGNOSIS — I89.0 LYMPHEDEMA OF BOTH LOWER EXTREMITIES: Primary | ICD-10-CM

## 2021-05-04 DIAGNOSIS — M67.88 ACHILLES TENDONOSIS OF RIGHT LOWER EXTREMITY: ICD-10-CM

## 2021-05-04 PROCEDURE — 99214 OFFICE O/P EST MOD 30 MIN: CPT | Performed by: ORTHOPAEDIC SURGERY

## 2021-05-04 NOTE — LETTER
NOTIFICATION  
 
5/4/2021 10:54 AM 
 
Ms. Lisandro Fonseca [address] [place] [address] To Whom It May Concern: 
 
Lisandro Fonseca is currently under the care of [address] [place], [place] [address]eddie Brock. Patient is to do sedentary work if avaliable due to extreme lymphedema in both of her lower extermities and right achilles tendonosis. If there are questions or concerns please have the patient contact our office.  
 
 
 
Sincerely, 
 
 
Zahra Michelle MD

## 2021-05-04 NOTE — PROGRESS NOTES
AMBULATORY PROGRESS NOTE      Patient: Ritchie Castillo             MRN: [id number]     SSN: [id number] Body mass index is 33.73 kg/m². YOB: 1955     AGE: 72 y.o. EX: female    PCP: David Figueroa NP       IMPRESSION //  DIAGNOSIS AND TREATMENT PLAN        Ritchie Castillo has a diagnosis of: Patient has significant right insertional Achilles tendinopathy. But because of her poor soft tissue envelope, she is at high risk for wound complications. So she would not be an ideal candidate for surgical intervention, i.e. sharp excisional debridement of Achilles tendon, and FHL tendon augmentation type procedure due to her poor soft tissue envelope venous stasis skin discoloration. I explained this to her. DIAGNOSES  1. Lymphedema of both lower extremities    2. Achilles tendonosis of right lower extremity        Orders Placed This Encounter    Generic Supply Order     Bilateral Jobst knee high compression stockings(LMS)  10-15 mm pressure    Right plantarflexion hinged AFO brace  Goal: to offload R achilles tendon        PLAN:    1. I will provide a DME order: Bilateral Jobst knee high compression stockings 10-15 mm pressure and a Right plantarflexion hinged AFO brace. 2. I will recommend pt to do gentle walking. 3. I will write a work note: Patient is to do sedentary work if avaliable due to extreme lymphedema in both of her lower extermities and right achilles tendinosis. RTO-   6 weeks    Ritchie Castillo  expresses understanding of the diagnosis, treatment plan, and all of their proposed questions were answered to their satisfaction. Patient education has been provided re the diagnoses. Ritchie Castillo may have a reminder for a \"due or due soon\" health maintenance. I have asked that she contact her primary care provider for follow-up on this health maintenance.         HPI //  OBJECTIVE EXAMINATION        Anju Healy IS A 72 y.o. female who is a/an  established patient, presenting to my outpatient office for evaluation of  the following chief complaint(s):     Chief Complaint   Patient presents with    Foot Pain     bilateral       Patient recalls intolerable,  right achilles tendon pain last night. She states the discoloration in her both of her legs has been present since December. Visit Vitals  Pulse 81   Temp (!) 96.3 °F (35.7 °C) (Temporal)   Resp 16   Ht 5' 6\" (1.676 m)   Wt 209 lb (94.8 kg)   SpO2 99%   BMI 33.73 kg/m²       Appearance: Alert, well appearing and pleasant patient who is in no distress, oriented to person, place/time, and who follows commands. This patient is accompanied in the examination room by her  self. There is signs of: no dementia  Psychiatric: Affect/mood are appropriate. Speech normal in context and clarity, memory intact grossly, no involuntary movements - tremors. Patient arrives to office via: without assistive device. H EENT (2): Head normocephalic & atraumatic. Eye: pupils are round// EOM are intact // Neck: ROM WNL  // Hearings Intact   Respiratory: Breathing non labored     ANKLE/FOOT right    Gait: slow  Tenderness: moderate insertional Achilles tendon  Cutaneous: discoloration and venous stasis on each of her legs, swelling on both of her legs  Joint Motion: Diminished range of motion ankle due to pain at the long the insertion point Achilles tendon  Joint / Tendon Stability: No Ankle or Subtalar instability or joint laxity. No peroneal sublux ability or dislocation  Alignment: neutral Hindfoot,    Neuro Motor/Sensory: NL/NL  Vascular: NL foot/ankle pulses,   Lymphatics: No extremity lymphedema, No calf swelling, no tenderness to calf muscles. CHART REVIEW     Az Barrientos has been experiencing pain and discomfort confirmed as outlined in the pain assessment outlined below.  was reviewed by Jamila Mai MD on 5/4/2021.      Pain Assessment  5/4/2021   Location of Pain Foot   Location Modifiers Right;Left   Severity of Pain 10   Quality of Pain Aching   Quality of Pain Comment -   Duration of Pain Persistent   Frequency of Pain Constant   Date Pain First Started -   Aggravating Factors Walking;Standing   Aggravating Factors Comment -   Limiting Behavior Yes   Relieving Factors Nothing   Relieving Factors Comment -   Result of Injury -        Jatinder Saul  has a past medical history of Essential hypertension, benign. Patients is employed at:         Past Medical History:   Diagnosis Date    Essential hypertension, benign      History reviewed. No pertinent surgical history. Current Outpatient Medications   Medication Sig    triamterene-hydroCHLOROthiazide (MAXZIDE) 37.5-25 mg per tablet TAKE 1 TABLET BY MOUTH EVERY DAY    methylPREDNISolone (MEDROL DOSEPACK) 4 mg tablet Per dose pack instructions    famotidine (PEPCID) 40 mg tablet Take 1 Tab by mouth daily.  triamcinolone (ARISTOCORT) 0.5 % topical cream Apply  to affected area two (2) times a day. use thin layer    diclofenac (VOLTAREN) 1 % gel Apply  to affected area four (4) times daily.  ibuprofen (MOTRIN) 800 mg tablet Take 1 Tab by mouth every eight (8) hours as needed for Pain (with food). No current facility-administered medications for this visit.       No Known Allergies  Social History     Occupational History    Not on file   Tobacco Use    Smoking status: Never Smoker    Smokeless tobacco: Never Used   Substance and Sexual Activity    Alcohol use: No    Drug use: Never    Sexual activity: Not on file     Family History   Problem Relation Age of Onset    Hypertension Other         DIAGNOSTIC LAB DATA      Lab Results   Component Value Date/Time    Hemoglobin A1c 6.3 (H) 01/09/2020 10:53 AM    Hemoglobin A1c 6.5 (H) 07/16/2019 12:35 PM    //   Lab Results   Component Value Date/Time    Glucose 96 06/15/2020 11:12 AM        Lab Results   Component Value Date/Time    Hemoglobin A1c (POC) 5.5 10/10/2019 11:21 PM         No results found for: Jacquiline Handler, XQVID, VD3RIA, [id number]      REVIEW OF SYSTEMS : 5/4/2021  ALL BELOW ARE Negative except : SEE HPI     All other systems reviewed and are negative. 12 point review of systems otherwise negative unless noted in HPI. DIAGNOSTIC IMAGING /ORDERS     MRI Results (most recent):  Results from Orders Only encounter on 04/14/21   MRI ANKLE RT WO CONT    Narrative EXAM: MRI OF THE RIGHT ANKLE    CLINICAL INDICATIONS/HISTORY: Posterior ankle pain, swelling. Evaluation of  Achilles tendon integrity requested. COMPARISON: No prior or recent relevant imaging is available for review. TECHNIQUE: Sagittal T1 and STIR; axial PD and T2 with fat saturation; coronal T2  with fat saturation and axial oblique PD images of the ankle were obtained.    _______________    FINDINGS:    ANKLE LIGAMENTS:  Anterior and posterior inferior tibiofibular ligaments and syndesmosis: Intact  Anterior and posterior talofibular and the calcaneofibular ligaments: Intact  Deltoid and spring ligaments: Superficial and deep components of the deltoid  ligament appear intact. Small foci of heterotopic ossification present within  the posterior band of the tibiotalar ligament has not and keeping with sequela  of prior sprain. Tibial spring ligament complex appears intact. ANKLE AND FOOT TENDONS:  Peroneus longus and brevis tendons: The peroneal tendons are normally located. Superior peroneal retinaculum appears intact. No evidence of peroneal tendon  tear or tenosynovitis. Tibialis posterior tendon: Intact and normal in signal intensity. Flexor digitorum longus and flexor hallucis longus tendons: Intact and normal in  signal intensity. Extensor tendons: Intact and normal in signal intensity.   Achilles tendon: There is moderate diffuse thickening of the distal 5 cm of the  Achilles tendon with accompanying intratendinous edema signal which is of  greatest prominence at the level of the Achilles insertion. Small foci of  partial-thickness tearing noted involving the central aspect of the insertional  fibers (reference series 5, image 20; series 7, image 19). Peritendinitis, trace  amount of retrocalcaneal bursal fluid, as well as sympathetic marrow edema along  the posterior and superior aspects of the calcaneus are present. OSSEOUS:  Ankle: No joint effusion. No fracture or contusion. Intact talar dome and  symmetric ankle mortise. Mild changes of chondrosis present across the anterior  aspect of the tibiotalar articulation. Hindfoot: No fracture or contusion. No  hindfoot coalition. Minor changes of chondrosis are noted involving the  posterior facet of the subtalar joints. Midfoot: No fracture or contusion. Mild changes of chondrosis noted across the  second and third tarsometatarsal articulations with small foci of subchondral  marrow edema present. OTHER:  Sinus tarsi: Normal fat signal intensity. Plantar fascia: Moderate thickening of the proximal central bundle of the  plantar fascia noted with plantar calcaneal spur formation. Tarsal tunnel: Normal in appearance. Regional soft tissues: No abnormal soft tissue fluid collections. _______________      Impression 1. Moderate tendinosis of the distal roughly 5 cm of the Achilles tendon with  superimposed small foci of partial-thickness tendon tearing involving the  distal/insertional central fibers. > Mild reactive peritendinitis and retrocalcaneal bursitis. > Sympathetic marrow edema involving the calcaneus adjacent to the Achilles  insertion. 2. Stigmata of prior medial ankle sprain. 3. Mild changes of chondrosis across the ankle, hindfoot, and midfoot as  described. 4. Moderate thickening proximal central bundle plantar fasciitis suggesting  sequela of prior plantar fascial inflammation.            I have reviewed the radiology transcribed results and I have reviewed the images of the above study. An electronic signature was used to authenticate this note. Disclaimer: Sections of this note are dictated using utilizing voice recognition software, which may have resulted in some phonetic based errors in grammar and contents. Even though attempts were made to correct all the mistakes, some may have been missed, and remained in the body of the document. If questions arise, please contact our department. Yony Gates, as dictated by Jalyn Wilkes MD  5/4/2021  9:10 AM

## 2021-06-01 ENCOUNTER — OFFICE VISIT (OUTPATIENT)
Dept: ORTHOPEDIC SURGERY | Age: 66
End: 2021-06-01
Payer: COMMERCIAL

## 2021-06-01 VITALS
OXYGEN SATURATION: 100 % | WEIGHT: 208 LBS | HEIGHT: 66 IN | BODY MASS INDEX: 33.43 KG/M2 | TEMPERATURE: 97.1 F | HEART RATE: 77 BPM

## 2021-06-01 DIAGNOSIS — I89.0 LYMPHEDEMA OF BOTH LOWER EXTREMITIES: ICD-10-CM

## 2021-06-01 DIAGNOSIS — M67.88 ACHILLES TENDONOSIS OF RIGHT LOWER EXTREMITY: Primary | ICD-10-CM

## 2021-06-01 PROCEDURE — 99213 OFFICE O/P EST LOW 20 MIN: CPT | Performed by: ORTHOPAEDIC SURGERY

## 2021-06-01 NOTE — PROGRESS NOTES
AMBULATORY PROGRESS NOTE      Patient: Eric Duarte             MRN: [id number]     SSN: [id number] Body mass index is 33.57 kg/m². YOB: 1955     AGE: 72 y.o. EX: female    PCP: Genoveva Rubin NP       IMPRESSION //  DIAGNOSIS AND TREATMENT PLAN        Eric Duarte has a diagnosis of:      DIAGNOSES    1. Achilles tendonosis of right lower extremity    2. Lymphedema of both lower extremities        No orders of the defined types were placed in this encounter. PLAN:    1. I will advise pt to continue stretching of her right achilles tendon. 2. I will have pt f/u with [facility] about the right hinged AFO brace. 3. I anticipate providing a recommendation to Paresh Thomastrain sleeve for right achilles tendon and then surgery on right achilles tendon if right achilles pain persists. RTO-       Eric Duarte  expresses understanding of the diagnosis, treatment plan, and all of their proposed questions were answered to their satisfaction. Patient education has been provided re the diagnoses. HPI //  OBJECTIVE EXAMINATION        Anju Prado IS A 72 y.o. female who is a/an  established patient, presenting to my outpatient office for evaluation of  the following chief complaint(s):     Chief Complaint   Patient presents with    Ankle Pain     left ankle     At [address] patient recalled intolerable,  right achilles tendon pain last night. She stated the discoloration in her both of her legs had been present since December. I provided a DME order: Bilateral Jobst knee high compression stockings 10-15 mm pressure and a Right plantarflexion hinged AFO brace. I recommended pt to do gentle walking. I wrote a work note: Patient is to do sedentary work if avaliable due to extreme lymphedema in both of her lower extermities and right achilles tendinosis.      Since LOV pt states she endorses an occasional, sharp pain in her right achilles tendon that eases away after. She mentions she has not obtained the Right hinged AFO brace I prescribed due to money issues. Pt states she has been getting off work 1 hour early due to right achilles tendon pain. She mentions the swelling of her right achilles tendon exacerbates with more activity. She has attended PT, which she had endorsed itching sensations. Denies h/o of DM. She works at [employer].      Visit Vitals  Pulse 77   Temp 97.1 °F (36.2 °C) (Temporal)   Ht 5' 6\" (1.676 m)   Wt 208 lb (94.3 kg)   SpO2 100%   BMI 33.57 kg/m²       Appearance: Alert, well appearing and pleasant patient who is in no distress, oriented to person, place/time, and who follows commands. This patient is accompanied in the examination room by her  self. There is signs of: no dementia  Psychiatric: Affect/mood are appropriate. Speech normal in context and clarity, memory intact grossly, no involuntary movements - tremors. Patient arrives to office via: without assistive device. H EENT (2): Head normocephalic & atraumatic. Eye: pupils are round// EOM are intact // Neck: ROM WNL  // Hearings Intact   Respiratory: Breathing non labored     ANKLE/FOOT right    Gait: slow  Tenderness: mild  Achilles insertion   Cutaneous:   Mild dependent pitting edema trace  Joint Motion:   WNL  Joint / Tendon Stability: No Ankle or Subtalar instability or joint laxity. No peroneal sublux ability or dislocation  Alignment: neutral Hindfoot,    Neuro Motor/Sensory: NL/NL  Vascular: NL foot/ankle pulses,   Lymphatics: No extremity lymphedema, No calf swelling, no tenderness to calf muscles. CHART REVIEW     Lisandro Fonseca has been experiencing pain and discomfort confirmed as outlined in the pain assessment outlined below.  was reviewed by Josefina Bruno MD on 6/1/2021.      Pain Assessment  6/1/2021   Location of Pain Ankle   Location Modifiers Left   Severity of Pain 0   Quality of Pain Sharp   Quality of Pain Comment -   Duration of Pain Other (Comment)   Duration of Pain Comment few seconds   Frequency of Pain Several days a week   Date Pain First Started -   Aggravating Factors Other (Comment)   Aggravating Factors Comment unknown   Limiting Behavior Yes   Relieving Factors Rest   Relieving Factors Comment -   Result of Injury No        Mairssa Martinez  has a past medical history of Essential hypertension, benign. Patients is employed at:         Past Medical History:   Diagnosis Date    Essential hypertension, benign      Past Surgical History:   Procedure Laterality Date    HX APPENDECTOMY      HX BREAST LUMPECTOMY Right      Current Outpatient Medications   Medication Sig    triamterene-hydroCHLOROthiazide (MAXZIDE) 37.5-25 mg per tablet TAKE 1 TABLET BY MOUTH EVERY DAY    methylPREDNISolone (MEDROL DOSEPACK) 4 mg tablet Per dose pack instructions    famotidine (PEPCID) 40 mg tablet Take 1 Tab by mouth daily.  triamcinolone (ARISTOCORT) 0.5 % topical cream Apply  to affected area two (2) times a day. use thin layer    diclofenac (VOLTAREN) 1 % gel Apply  to affected area four (4) times daily.  ibuprofen (MOTRIN) 800 mg tablet Take 1 Tab by mouth every eight (8) hours as needed for Pain (with food). No current facility-administered medications for this visit.      No Known Allergies  Social History     Occupational History    Not on file   Tobacco Use    Smoking status: Never Smoker    Smokeless tobacco: Never Used   Substance and Sexual Activity    Alcohol use: No    Drug use: Never    Sexual activity: Not on file     Family History   Problem Relation Age of Onset    Hypertension Other         DIAGNOSTIC LAB DATA      Lab Results   Component Value Date/Time    Hemoglobin A1c 6.3 (H) 01/09/2020 10:53 AM    Hemoglobin A1c 6.5 (H) 07/16/2019 12:35 PM    //   Lab Results   Component Value Date/Time    Glucose 96 06/15/2020 11:12 AM        Lab Results   Component Value Date/Time Hemoglobin A1c (POC) 5.5 10/10/2019 11:21 PM         No results found for: VITD3, Clifton Jasbir, XQVID, VD3RIA, [id number]      REVIEW OF SYSTEMS : 6/1/2021  ALL BELOW ARE Negative except : SEE HPI     All other systems reviewed and are negative. 12 point review of systems otherwise negative unless noted in HPI. DIAGNOSTIC IMAGING /ORDERS      none today      I have reviewed the results of the above study. The interpretation of this study is my professional opinion. An electronic signature was used to authenticate this note. Disclaimer: Sections of this note are dictated using utilizing voice recognition software, which may have resulted in some phonetic based errors in grammar and contents. Even though attempts were made to correct all the mistakes, some may have been missed, and remained in the body of the document. If questions arise, please contact our department. Mouna Desouza may have a reminder for a \"due or due soon\" health maintenance. I have asked that she contact her primary care provider for follow-up on this health maintenance. Jorge Madden, as dictated by Gianni Wilkes MD  6/1/2021  7:49 AM

## 2021-08-05 RX ORDER — TRIAMTERENE/HYDROCHLOROTHIAZID 37.5-25 MG
TABLET ORAL
Qty: 90 TABLET | Refills: 1 | Status: CANCELLED | OUTPATIENT
Start: 2021-08-05

## 2021-08-05 NOTE — TELEPHONE ENCOUNTER
Requested Prescriptions     Pending Prescriptions Disp Refills    triamterene-hydroCHLOROthiazide (MAXZIDE) 37.5-25 mg per tablet 90 Tablet 1     Pt currently out her medication.

## 2021-08-23 NOTE — TELEPHONE ENCOUNTER
Please advise patient of normal ultrasound results. Pt presents with upper back pain that started yesterday around 3pm. Pt reports the pain occasionally radiates around his ribs. Pt has a hx of this pain. Denies trauma. Denies chest pain or SOB. ABCS intact. Received 50mcg fentanyl en route which took away the pain.

## 2021-09-07 NOTE — PROGRESS NOTES
AMBULATORY PROGRESS NOTE      Patient: Marlene Witt             MRN: [id number]     SSN: [id number] Body mass index is 33.57 kg/m². YOB: 1955     AGE: 72 y.o. EX: female    PCP: Daria Patel NP       IMPRESSION //  DIAGNOSIS AND TREATMENT PLAN        Marlene Witt has a diagnosis of:      DIAGNOSES    1. Acute right ankle pain    2. Achilles tendonosis of right lower extremity    3. Lymphedema of both lower extremities        Orders Placed This Encounter    [03009] Ankle 2V     Order Specific Question:   Weight bearing? Answer: Yes    [35084] Foot Min 3V     Order Specific Question:   Weight bearing? Answer: Yes    REFERRAL TO PHYSICAL THERAPY     Referral Priority:   Routine     Referral Type:   PT/OT/ST     Referral Reason:   Specialty Services Required     Number of Visits Requested:   1        PLAN:    1. Obtained 2-View XR of R ankle and 3-View XR of R foot  2. Advise pt to use compression stocking regularly. 3.  Refer pt to Physical Therapy  4. Work note:  Remain out of work from 9/8/2021 to 9/15/2021.  5. Advise pt to apply calamine lotion to affected area prior to wearing compression stockings      RTO-  3 months    Marlene Witt  expresses understanding of the diagnosis, treatment plan, and all of their proposed questions were answered to their satisfaction. Patient education has been provided re the diagnoses. HPI //  OBJECTIVE EXAMINATION        Anju Manning IS A 72 y.o. female who is a/an  established patient, presenting to my outpatient office for evaluation of  the following chief complaint(s):     Chief Complaint   Patient presents with    Ankle Pain     bilateral ankles     Swelling       At LOV pt presented w/ right ankle pain. Advised pt to continue stretching her right achiles tendon. [facility] and the right hinged AFO brace.  Anticipate reccomendation to [employer] Achillotrain sleeve for right achiles tendon and then surgery on right achiles tendon if right achiles pain persists. Since LOV presents today w/ bilateral ankle pain/swelling. She notes both has bilateral achilles tendon pain. Mentions throbbing in the back of her right leg. States her toes are pulled close together while she is driving. Notes the medication she is using is not providing relief. She was unable to get the AFO from [facility] b/c she couldn't afford it. Patient also notes she is starting to have balance problems. H/o of lymphedema. She has a new job now. Visit Vitals  Pulse 85   Temp 98.6 °F (37 °C) (Temporal)   Ht 5' 6\" (1.676 m)   Wt 208 lb (94.3 kg)   SpO2 96%   BMI 33.57 kg/m²       Appearance: Alert, well appearing and pleasant patient who is in no distress, oriented to person, place/time, and who follows commands. This patient is accompanied in the examination room by her  self. There is signs of: no dementia  Psychiatric: Affect/mood are appropriate. Speech normal in context and clarity, memory intact grossly, no involuntary movements - tremors. Patient arrives to office via: without assistive device:   H EENT (2): Head normocephalic & atraumatic. Eye: pupils are round// EOM are intact // Neck: ROM WNL  // Hearings Intact   Respiratory: Breathing non labored     ANKLE/FOOT bilateral     Gait: slow  Tenderness: mild over  Achilles tendon of both ankle   Cutaneous: Venous stasis disease as he has brawny discoloration to the distal supramalleolar region of each ankle, some pitting edema. Joint Motion: Ankle and hindfoot motion are bilaterally: Normal  Joint / Tendon Stability: No Ankle or Subtalar instability or joint laxity. No peroneal sublux ability or dislocation  Alignment: neutral Hindfoot,    Neuro Motor/Sensory: NL/NL  Vascular: NL foot/ankle pulses,   Lymphatics: No extremity lymphedema, No calf swelling, no tenderness to calf muscles.           CHART REVIEW     Coco Laurent has been experiencing pain and discomfort confirmed as outlined in the pain assessment outlined below.  was reviewed by Aman Diaz MD on 9/8/2021. Pain Assessment  9/8/2021   Location of Pain Ankle   Location Modifiers Left;Right   Severity of Pain 5   Quality of Pain Other (Comment); Aching   Quality of Pain Comment swelling   Duration of Pain -   Duration of Pain Comment -   Frequency of Pain Intermittent   Date Pain First Started -   Aggravating Factors Walking;Standing; Other (Comment)   Aggravating Factors Comment standing for long periods of times   Limiting Behavior Yes   Relieving Factors Nothing   Relieving Factors Comment -   Result of Injury -        Harry Emmanuel  has a past medical history of Essential hypertension, benign. Patients is employed at:         Past Medical History:   Diagnosis Date    Essential hypertension, benign      Past Surgical History:   Procedure Laterality Date    HX APPENDECTOMY      HX BREAST LUMPECTOMY Right      Current Outpatient Medications   Medication Sig    triamterene-hydroCHLOROthiazide (MAXZIDE) 37.5-25 mg per tablet TAKE 1 TABLET BY MOUTH EVERY DAY    famotidine (PEPCID) 40 mg tablet Take 1 Tab by mouth daily.  triamcinolone (ARISTOCORT) 0.5 % topical cream Apply  to affected area two (2) times a day. use thin layer    diclofenac (VOLTAREN) 1 % gel Apply  to affected area four (4) times daily.  ibuprofen (MOTRIN) 800 mg tablet Take 1 Tab by mouth every eight (8) hours as needed for Pain (with food).  methylPREDNISolone (MEDROL DOSEPACK) 4 mg tablet Per dose pack instructions (Patient not taking: Reported on 9/8/2021)     No current facility-administered medications for this visit.      No Known Allergies  Social History     Occupational History    Not on file   Tobacco Use    Smoking status: Never Smoker    Smokeless tobacco: Never Used   Substance and Sexual Activity    Alcohol use: No    Drug use: Never    Sexual activity: Not on file     Family History   Problem Relation Age of Onset    Hypertension Other         DIAGNOSTIC LAB DATA      Lab Results   Component Value Date/Time    Hemoglobin A1c 6.3 (H) 01/09/2020 10:53 AM    Hemoglobin A1c 6.5 (H) 07/16/2019 12:35 PM    //   Lab Results   Component Value Date/Time    Glucose 96 06/15/2020 11:12 AM        Lab Results   Component Value Date/Time    Hemoglobin A1c (POC) 5.5 10/10/2019 11:21 PM         No results found for: Berjulia Morgans, XQVID3, XQVID, VD3RIA, [id number]      REVIEW OF SYSTEMS : 9/8/2021  ALL BELOW ARE Negative except : SEE HPI     All other systems reviewed and are negative. 12 point review of systems otherwise negative unless noted in HPI. DIAGNOSTIC IMAGING /ORDERS       Orders Placed This Encounter    [01087] Ankle 2V     Order Specific Question:   Weight bearing? Answer: Yes    [99902] Foot Min 3V     Order Specific Question:   Weight bearing? Answer: Yes    REFERRAL TO PHYSICAL THERAPY     Referral Priority:   Routine     Referral Type:   PT/OT/ST     Referral Reason:   Specialty Services Required     Number of Visits Requested:   1        FOOT X RAYS 3 VIEWS Right   AP lateral and oblique x-rays   9/8/2021    NON WEIGHT BEARING    X RAYS AT [address] [place] [address]  9/8/2021      Bones: No fractures or dislocations. No focal osteolytic or osteoblastic process     Bone Spurs: No significant bone spurs  Foot Alignment: WNL  Joint Condition: No Significant OA  Soft Tissues: Increased soft tissues medial laterally, suggestive of lymphedema or venous stasis disease// no radiopaque foreign body and No abnormal calcific densities to soft tissues   No ankle joint effusion in lateral projection.   Mineralization: Suggests  no Osteopenia    I have personally reviewed the results of the above study and the interpretation of this study is my professional opinion           ANKLE X RAYS 3 VIEWS Right   X RAYS AT [address] [place] [address]  9/8/2021    NON WEIGHT BEARING AP  and oblique x-rays    X RAYS AT [address] [place] [address]  9/8/2021    Bones: No fractures or dislocations. No focal osteolytic or osteoblastic process     Bone Spurs: No significant bone spurs  Alignment: Ankle mortise alignment is congruent, Tibial plafond and talar dome intact. No Osteochondral defects seen   Joint: No Significant OA changes present  Soft Tissues: Increased soft tissues medial laterally, suggestive of lymphedema or venous stasis disease// No radiopaque foreign body     No abnormal calcific densities to soft tissues    No ankle joint effusion in lateral projection. Mineralization: Suggests no Osteopenia    I have personally reviewed the results of the above study. The interpretation of this study is my professional opinion             On this date 09/08/2021 I have spent 30 minutes reviewing previous notes, test results and face to face with the patient discussing the diagnosis and importance of compliance with the treatment plan as well as documenting on the day of the visit. An electronic signature was used to authenticate this note. Disclaimer: Sections of this note are dictated using utilizing voice recognition software, which may have resulted in some phonetic based errors in grammar and contents. Even though attempts were made to correct all the mistakes, some may have been missed, and remained in the body of the document. If questions arise, please contact our department. Harry Emmaunel may have a reminder for a \"due or due soon\" health maintenance. I have asked that she contact her primary care provider for follow-up on this health maintenance.     Carrol Barry, as dictated by  Aman Diaz MD  9/8/2021  8:39 AM

## 2021-09-08 ENCOUNTER — OFFICE VISIT (OUTPATIENT)
Dept: ORTHOPEDIC SURGERY | Age: 66
End: 2021-09-08

## 2021-09-08 VITALS
HEIGHT: 66 IN | BODY MASS INDEX: 33.43 KG/M2 | OXYGEN SATURATION: 96 % | TEMPERATURE: 98.6 F | WEIGHT: 208 LBS | HEART RATE: 85 BPM

## 2021-09-08 DIAGNOSIS — M67.88 ACHILLES TENDONOSIS OF RIGHT LOWER EXTREMITY: ICD-10-CM

## 2021-09-08 DIAGNOSIS — I89.0 LYMPHEDEMA OF BOTH LOWER EXTREMITIES: ICD-10-CM

## 2021-09-08 DIAGNOSIS — M25.571 ACUTE RIGHT ANKLE PAIN: Primary | ICD-10-CM

## 2021-09-08 PROCEDURE — 73600 X-RAY EXAM OF ANKLE: CPT | Performed by: ORTHOPAEDIC SURGERY

## 2021-09-08 PROCEDURE — 99214 OFFICE O/P EST MOD 30 MIN: CPT | Performed by: ORTHOPAEDIC SURGERY

## 2021-09-08 PROCEDURE — 73630 X-RAY EXAM OF FOOT: CPT | Performed by: ORTHOPAEDIC SURGERY

## 2021-09-08 NOTE — LETTER
NOTIFICATION     9/8/2021 10:24 AM    Ms. Jeremiah Bolanos  [address] [place] [id number]      To Whom It May Concern:    Jeremiah Bolanos is currently under the care of [address] [place] [address] Luis Alberto Brock. Please allow Jeremiah Bolanos to remain out of work from 9/8/2021 to 9/15/2021. If there are questions or concerns please have the patient contact our office.         Sincerely,      Savage Blancas MD

## 2021-10-19 ENCOUNTER — OFFICE VISIT (OUTPATIENT)
Dept: ORTHOPEDIC SURGERY | Age: 66
End: 2021-10-19
Payer: MEDICARE

## 2021-10-19 VITALS
HEART RATE: 100 BPM | BODY MASS INDEX: 33.43 KG/M2 | WEIGHT: 208 LBS | HEIGHT: 66 IN | OXYGEN SATURATION: 98 % | TEMPERATURE: 98.4 F

## 2021-10-19 DIAGNOSIS — M67.88 ACHILLES TENDONOSIS OF RIGHT LOWER EXTREMITY: Primary | ICD-10-CM

## 2021-10-19 DIAGNOSIS — M76.61 ACHILLES TENDINITIS OF BOTH LOWER EXTREMITIES: ICD-10-CM

## 2021-10-19 DIAGNOSIS — M76.62 ACHILLES TENDINITIS OF BOTH LOWER EXTREMITIES: ICD-10-CM

## 2021-10-19 DIAGNOSIS — I89.0 LYMPHEDEMA OF BOTH LOWER EXTREMITIES: ICD-10-CM

## 2021-10-19 PROCEDURE — 99213 OFFICE O/P EST LOW 20 MIN: CPT | Performed by: ORTHOPAEDIC SURGERY

## 2021-10-19 NOTE — PROGRESS NOTES
AMBULATORY PROGRESS NOTE      Patient: Sourav Segura             MRN: [id number]     SSN: [id number] Body mass index is 33.57 kg/m². YOB: 1955     AGE: 72 y.o. EX: female    PCP: Jeannine Khan NP       IMPRESSION //  DIAGNOSIS AND TREATMENT PLAN        Sourav Segura has a diagnosis of:      Doing a bit better, and will get a continue conservative care for her. We will see her again in 3 months time. She works at 10 PM to 8 AM shift. She is still having occasional discomfort, to the anterior aspect of her left ankle, had some swelling the anterior part of her left ankle, a few weeks ago. But in evaluating her today, I see no tenderness no swelling no erythema no asymmetry comparing her left lower extremity, below the knee, compared to the right similar location and complication on her right side. I told her encourage her to take digital photography, to document when she has swelling so we can quantify this. But again as relates to her left anterior lower leg, no abnormal swelling at this time. DIAGNOSES    1. Achilles tendonosis of right lower extremity    2. Lymphedema of both lower extremities        No orders of the defined types were placed in this encounter. PLAN:    1. Document when leg swelling  2. Advise pt to do Runner's strech      RTO-   3 months    Sourav Segura  expresses understanding of the diagnosis, treatment plan, and all of their proposed questions were answered to their satisfaction. Patient education has been provided re the diagnoses. HPI //  OBJECTIVE EXAMINATION        Sourav Segura IS A 72 y.o. female who is a/an  established patient, presenting to my outpatient office for evaluation of  the following chief complaint(s):     Chief Complaint   Patient presents with    Leg Pain     left leg     Swelling       At [address]  presented w/bilateral ankle pain/swelling. Obtained 2-View XR of R ankle and 3-View XR of R foot. Advise pt to use compression stocking regularly. Refer pt to Physical Therapy. Work note:  Remain out of work from 9/8/2021 to 9/15/2021. Advise pt to apply calamine lotion to affected area prior to wearing compression stockings. Since Rosamaria Dalton continues to endorse left leg pain/swelling. She reports her leg was swollen after work on 10/12/2021. She states she hasn't been able to complete physical therapy because she said the place was full. H/O lymphedema. Visit Vitals  Pulse 100   Temp 98.4 °F (36.9 °C) (Temporal)   Ht 5' 6\" (1.676 m)   Wt 208 lb (94.3 kg)   SpO2 98%   BMI 33.57 kg/m²       Appearance: Alert, well appearing and pleasant patient who is in no distress, oriented to person, place/time, and who follows commands. This patient is accompanied in the examination room by her  self. There is signs of: no dementia  Psychiatric: Affect/mood are appropriate. Speech normal in context and clarity, memory intact grossly, no involuntary movements - tremors. Patient arrives to office via: without assistive device:   H EENT (2): Head normocephalic & atraumatic. Eye: pupils are round// EOM are intact // Neck: ROM WNL  // Hearings Intact   Respiratory: Breathing non labored     ANKLE/FOOT left    Gait: normal  Tenderness: mild    Achilles tendon  Cutaneous:   WNL. Joint Motion:   WNL //      Joint / Tendon Stability: No Ankle or Subtalar instability or joint laxity. No peroneal sublux ability or dislocation  Alignment: neutral Hindfoot,    Neuro Motor/Sensory: NL/NL  Vascular: NL foot/ankle pulses,   Lymphatics: No extremity lymphedema, No calf swelling, no tenderness to calf muscles. CHART REVIEW     Alicia Mock has been experiencing pain and discomfort confirmed as outlined in the pain assessment outlined below.  was reviewed by Ricardo Crain MD on 10/19/2021.      Pain Assessment  10/19/2021   Location of Pain Leg   Location Modifiers Left   Severity of Pain 8 Quality of Pain Aching   Quality of Pain Comment -   Duration of Pain -   Duration of Pain Comment -   Frequency of Pain -   Date Pain First Started -   Aggravating Factors -   Aggravating Factors Comment -   Limiting Behavior -   Relieving Factors -   Relieving Factors Comment -   Result of Injury -        Trousdale Lauren  has a past medical history of Essential hypertension, benign. Patients is employed at:         Past Medical History:   Diagnosis Date    Essential hypertension, benign      Past Surgical History:   Procedure Laterality Date    HX APPENDECTOMY      HX BREAST LUMPECTOMY Right      Current Outpatient Medications   Medication Sig    triamterene-hydroCHLOROthiazide (MAXZIDE) 37.5-25 mg per tablet TAKE 1 TABLET BY MOUTH EVERY DAY    famotidine (PEPCID) 40 mg tablet Take 1 Tab by mouth daily.  triamcinolone (ARISTOCORT) 0.5 % topical cream Apply  to affected area two (2) times a day. use thin layer    diclofenac (VOLTAREN) 1 % gel Apply  to affected area four (4) times daily.  ibuprofen (MOTRIN) 800 mg tablet Take 1 Tab by mouth every eight (8) hours as needed for Pain (with food).  methylPREDNISolone (MEDROL DOSEPACK) 4 mg tablet Per dose pack instructions (Patient not taking: Reported on 9/8/2021)     No current facility-administered medications for this visit.      No Known Allergies  Social History     Occupational History    Not on file   Tobacco Use    Smoking status: Never Smoker    Smokeless tobacco: Never Used   Substance and Sexual Activity    Alcohol use: No    Drug use: Never    Sexual activity: Not on file     Family History   Problem Relation Age of Onset    Hypertension Other         DIAGNOSTIC LAB DATA      Lab Results   Component Value Date/Time    Hemoglobin A1c 6.3 (H) 01/09/2020 10:53 AM    Hemoglobin A1c 6.5 (H) 07/16/2019 12:35 PM    //   Lab Results   Component Value Date/Time    Glucose 96 06/15/2020 11:12 AM        Lab Results   Component Value Date/Time Hemoglobin A1c (POC) 5.5 10/10/2019 11:21 PM         No results found for: VITD3, XQVID2, XQVID3, XQVID, VD3RIA, [id number]     Drug Screen Most Recent Result Date    No resulted procedures found. REVIEW OF SYSTEMS : 10/19/2021  ALL BELOW ARE Negative except : SEE HPI     All other systems reviewed and are negative. 12 point review of systems otherwise negative unless noted in HPI. DIAGNOSTIC IMAGING /ORDERS       No orders of the defined types were placed in this encounter. I have reviewed the results of the above study. The interpretation of this study is my professional opinion. On this date 10/19/2021 I have spent 30 minutes reviewing previous notes, test results and face to face with the patient discussing the diagnosis and importance of compliance with the treatment plan as well as documenting on the day of the visit. An electronic signature was used to authenticate this note. Disclaimer: Sections of this note are dictated using utilizing voice recognition software, which may have resulted in some phonetic based errors in grammar and contents. Even though attempts were made to correct all the mistakes, some may have been missed, and remained in the body of the document. If questions arise, please contact our department. Basia Vidal may have a reminder for a \"due or due soon\" health maintenance. I have asked that she contact her primary care provider for follow-up on this health maintenance.     Tho Newsome, as directed by Katia  10/19/2021  7:55 AM

## 2021-11-03 RX ORDER — TRIAMTERENE/HYDROCHLOROTHIAZID 37.5-25 MG
TABLET ORAL
Qty: 90 TABLET | Refills: 1 | Status: SHIPPED | OUTPATIENT
Start: 2021-11-03 | End: 2022-04-20 | Stop reason: SDUPTHER

## 2021-11-03 NOTE — TELEPHONE ENCOUNTER
Please ask patient if she is still a patient at [facility]. Thanks!

## 2021-11-09 RX ORDER — TRIAMTERENE/HYDROCHLOROTHIAZID 37.5-25 MG
TABLET ORAL
Qty: 90 TABLET | Refills: 1 | Status: CANCELLED | OUTPATIENT
Start: 2021-11-09

## 2021-11-09 NOTE — TELEPHONE ENCOUNTER
Pt is still a pt with [facility].   Requested Prescriptions     Pending Prescriptions Disp Refills    triamterene-hydroCHLOROthiazide (MAXZIDE) 37.5-25 mg per tablet 90 Tablet 1

## 2021-12-16 ENCOUNTER — OFFICE VISIT (OUTPATIENT)
Dept: FAMILY MEDICINE CLINIC | Age: 66
End: 2021-12-16

## 2021-12-16 VITALS
RESPIRATION RATE: 18 BRPM | SYSTOLIC BLOOD PRESSURE: 150 MMHG | WEIGHT: 213 LBS | HEART RATE: 82 BPM | TEMPERATURE: 99 F | DIASTOLIC BLOOD PRESSURE: 86 MMHG | OXYGEN SATURATION: 98 % | HEIGHT: 66 IN | BODY MASS INDEX: 34.23 KG/M2

## 2021-12-16 DIAGNOSIS — I10 ESSENTIAL HYPERTENSION: Primary | ICD-10-CM

## 2021-12-16 DIAGNOSIS — I89.0 LYMPHEDEMA: ICD-10-CM

## 2021-12-16 DIAGNOSIS — M19.90 ARTHRITIS: ICD-10-CM

## 2021-12-16 PROCEDURE — 1101F PT FALLS ASSESS-DOCD LE1/YR: CPT | Performed by: NURSE PRACTITIONER

## 2021-12-16 PROCEDURE — G8432 DEP SCR NOT DOC, RNG: HCPCS | Performed by: NURSE PRACTITIONER

## 2021-12-16 PROCEDURE — G8417 CALC BMI ABV UP PARAM F/U: HCPCS | Performed by: NURSE PRACTITIONER

## 2021-12-16 PROCEDURE — 99214 OFFICE O/P EST MOD 30 MIN: CPT | Performed by: NURSE PRACTITIONER

## 2021-12-16 PROCEDURE — 3017F COLORECTAL CA SCREEN DOC REV: CPT | Performed by: NURSE PRACTITIONER

## 2021-12-16 PROCEDURE — G0463 HOSPITAL OUTPT CLINIC VISIT: HCPCS | Performed by: NURSE PRACTITIONER

## 2021-12-16 PROCEDURE — 1090F PRES/ABSN URINE INCON ASSESS: CPT | Performed by: NURSE PRACTITIONER

## 2021-12-16 PROCEDURE — G8427 DOCREV CUR MEDS BY ELIG CLIN: HCPCS | Performed by: NURSE PRACTITIONER

## 2021-12-16 PROCEDURE — G8400 PT W/DXA NO RESULTS DOC: HCPCS | Performed by: NURSE PRACTITIONER

## 2021-12-16 PROCEDURE — G8536 NO DOC ELDER MAL SCRN: HCPCS | Performed by: NURSE PRACTITIONER

## 2021-12-16 RX ORDER — FAMOTIDINE 40 MG/1
40 TABLET, FILM COATED ORAL
Qty: 60 TABLET | Refills: 2 | Status: SHIPPED | OUTPATIENT
Start: 2021-12-16

## 2021-12-16 RX ORDER — DICLOFENAC SODIUM 50 MG/1
50 TABLET, DELAYED RELEASE ORAL
Qty: 60 TABLET | Refills: 2 | Status: SHIPPED | OUTPATIENT
Start: 2021-12-16

## 2021-12-16 NOTE — LETTER
NOTIFICATION RETURN TO WORK    12/16/2021 4:12 PM    Ms. Ly Velarde  [address] [facility]  [phone]      To Whom It May Concern:    Ly Velarde [facility]. She will return to work on: 12/20/2021    If there are questions or concerns please have the patient contact our office.         Sincerely,      Cory Ochoa NP

## 2021-12-16 NOTE — PROGRESS NOTES
Depression Screening:  3 most recent PHQ Screens 12/16/2021   PHQ Not Done -   Little interest or pleasure in doing things Not at all   Feeling down, depressed, irritable, or hopeless Not at all   Total Score PHQ 2 0       Learning Assessment:  Learning Assessment 1/15/2020   PRIMARY LEARNER Patient   HIGHEST LEVEL OF EDUCATION - PRIMARY LEARNER  GRADUATED HIGH SCHOOL OR GED   BARRIERS PRIMARY LEARNER NONE   CO-LEARNER CAREGIVER No   PRIMARY LANGUAGE ENGLISH   LEARNER PREFERENCE PRIMARY DEMONSTRATION   ANSWERED BY Patient   RELATIONSHIP SELF       Abuse Screening:  Abuse Screening Questionnaire 6/15/2020   Do you ever feel afraid of your partner? N   Are you in a relationship with someone who physically or mentally threatens you? N   Is it safe for you to go home? Y       Fall Risk  Fall Risk Assessment, last 12 mths 10/19/2021   Able to walk? Yes   Fall in past 12 months? 0   Do you feel unsteady? 0   Are you worried about falling 0   Is TUG test greater than 12 seconds? -   Is the gait abnormal? -       ADL  No flowsheet data found. Travel Screening:    Travel Screening     Question   Response    In the last month, have you been in contact with someone who was confirmed or suspected to have Coronavirus / COVID-19? No / Unsure    Have you had a COVID-19 viral test in the last 14 days? No    Do you have any of the following new or worsening symptoms? None of these    Have you traveled internationally or domestically in the last month? No      Travel History   Travel since 11/16/21    No documented travel since 11/16/21         Health Maintenance reviewed and discussed and ordered per Provider.     Health Maintenance Due   Topic Date Due    Hepatitis C Screening  Never done    COVID-19 Vaccine (1) Never done    DTaP/Tdap/Td series (1 - Tdap) Never done    Cervical cancer screen  Never done    Colorectal Cancer Screening Combo  Never done    Shingrix Vaccine Age 50> (1 of 2) Never done    Breast Cancer Screen Mammogram  01/27/2014    Bone Densitometry (Dexa) Screening  Never done    Pneumococcal 65+ years (1 of 1 - PPSV23) Never done    Flu Vaccine (1) Never done   . Clemencia Romero presents today for   Chief Complaint   Patient presents with    Follow-up    Hypertension    Leg Pain     bilateral leg pain and swelling been going around     Back Pain     lower back        Is someone accompanying this pt? no    Is the patient using any DME equipment during OV? no    Coordination of Care:  1. Have you been to the ER, urgent care clinic since your last visit? Hospitalized since your last visit? no    2. Have you seen or consulted any other health care providers outside of the [address] [place] [address] since your last visit? Include any pap smears or colon screening.  no

## 2021-12-16 NOTE — PROGRESS NOTES
Laci Guerra is a 72 y.o. female who was seen in clinic today (12/16/2021) for Follow-up, Hypertension, Leg Pain (bilateral leg pain and swelling been going around ), and Back Pain (lower back )    Assessment & Plan:   Diagnoses and all orders for this visit:    1. Essential hypertension    2. Lymphedema    3. Arthritis    Other orders  -     diclofenac EC (VOLTAREN) 50 mg EC tablet; Take 1 Tablet by mouth two (2) times daily as needed for Pain (take with food). -     famotidine (PEPCID) 40 mg tablet; Take 1 Tablet by mouth two (2) times daily as needed for Heartburn (take with diclofenac). Discussed adhering to a low sodium diet, elevated legs to help with swelling. I have discussed the diagnosis with the patient and the intended plan as seen in the above orders. The patient has received an after-visit summary and questions were answered concerning future plans. I have discussed medication side effects and warnings with the patient as well. Patient agreeable with above plan and verbalizes understanding. Follow-up and Dispositions    · Return in about 4 weeks (around 1/13/2022) for arthritic pain, virtual follow up. Subjective:   Hypertension ROS: taking medications as instructed, no medication side effects noted, no TIA's, no chest pain on exertion, no dyspnea on exertion, noting swelling of ankles. Reports she continues to have swelling with lymphedema. Further states she continues to have pain with tendonitis.     Lab Results   Component Value Date/Time    Sodium 139 06/15/2020 11:12 AM    Potassium 4.2 06/15/2020 11:12 AM    Chloride 100 06/15/2020 11:12 AM    CO2 27 06/15/2020 11:12 AM    Anion gap 12.0 06/15/2020 11:12 AM    Glucose 96 06/15/2020 11:12 AM    BUN 19 06/15/2020 11:12 AM    Creatinine 0.8 06/15/2020 11:12 AM    BUN/Creatinine ratio 16 01/09/2020 10:53 AM    GFR est AA >60 01/09/2020 10:53 AM    GFR est non-AA 53 (L) 01/09/2020 10:53 AM    Calcium 10.3 06/15/2020 11:12 AM Bilirubin, total 0.3 01/09/2020 10:53 AM    Alk. phosphatase 104 01/09/2020 10:53 AM    Protein, total 8.1 01/09/2020 10:53 AM    Albumin 3.4 01/09/2020 10:53 AM    Globulin 4.7 (H) 01/09/2020 10:53 AM    A-G Ratio 0.7 (L) 01/09/2020 10:53 AM    ALT (SGPT) 30 01/09/2020 10:53 AM    AST (SGOT) 26 01/09/2020 10:53 AM     Lab Results   Component Value Date/Time    Cholesterol, total 150 01/09/2020 10:53 AM    HDL Cholesterol 47 01/09/2020 10:53 AM    LDL, calculated 85.8 01/09/2020 10:53 AM    VLDL, calculated 17.2 01/09/2020 10:53 AM    Triglyceride 86 01/09/2020 10:53 AM    CHOL/HDL Ratio 3.2 01/09/2020 10:53 AM     Lab Results   Component Value Date/Time    Hemoglobin A1c 6.3 (H) 01/09/2020 10:53 AM    Hemoglobin A1c (POC) 5.5 10/10/2019 11:21 PM     Wt Readings from Last 3 Encounters:   12/16/21 213 lb (96.6 kg)   10/19/21 208 lb (94.3 kg)   09/08/21 208 lb (94.3 kg)     Temp Readings from Last 3 Encounters:   12/16/21 99 °F (37.2 °C) (Temporal)   10/19/21 98.4 °F (36.9 °C) (Temporal)   09/08/21 98.6 °F (37 °C) (Temporal)     BP Readings from Last 3 Encounters:   12/16/21 (!) 154/88   04/07/21 135/75   03/16/21 (!) 163/89     Pulse Readings from Last 3 Encounters:   12/16/21 82   10/19/21 100   09/08/21 85     Prior to Admission medications    Medication Sig Start Date End Date Taking? Authorizing Provider   triamterene-hydroCHLOROthiazide (MAXZIDE) 37.5-25 mg per tablet TAKE 1 TABLET BY MOUTH EVERY DAY **NEED NEW INSURANCE 11/3/21  Yes Hazel Carrasco NP   famotidine (PEPCID) 40 mg tablet Take 1 Tab by mouth daily. 3/16/21  Yes Iliana Wilkes MD   triamcinolone (ARISTOCORT) 0.5 % topical cream Apply  to affected area two (2) times a day. use thin layer 9/4/20  Yes Jerry TIPTON NP   diclofenac (VOLTAREN) 1 % gel Apply  to affected area four (4) times daily. 10/10/19  Yes Hazel Carrasco NP   ibuprofen (MOTRIN) 800 mg tablet Take 1 Tab by mouth every eight (8) hours as needed for Pain (with food).  8/6/19  Yes Camilo Schafer NP   methylPREDNISolone (MEDROL DOSEPACK) 4 mg tablet Per dose pack instructions  Patient not taking: Reported on 9/8/2021 3/16/21   Evita Sánchez MD     The following sections were reviewed & updated as appropriate: PMH, PSH, FH, and SH. Review of Systems   Constitutional: Negative for activity change, appetite change, chills, fatigue and fever. Respiratory: Negative for chest tightness and shortness of breath. Cardiovascular: Positive for leg swelling. Negative for chest pain. Musculoskeletal: Positive for back pain (mid). Neurological: Negative for dizziness and headaches. Objective:     Visit Vitals  BP (!) 150/86 (BP 1 Location: Left upper arm, BP Patient Position: Sitting, BP Cuff Size: Adult)   Pulse 82   Temp 99 °F (37.2 °C) (Temporal)   Resp 18   Ht 5' 6\" (1.676 m)   Wt 213 lb (96.6 kg)   SpO2 98%   BMI 34.38 kg/m²      Physical Exam  Constitutional:       General: She is not in acute distress. Appearance: She is well-developed. HENT:      Head: Normocephalic and atraumatic. Neck:      Vascular: No carotid bruit. Cardiovascular:      Rate and Rhythm: Normal rate and regular rhythm. Heart sounds: Normal heart sounds. No murmur heard. No friction rub. No gallop. Pulmonary:      Effort: Pulmonary effort is normal.      Breath sounds: Normal breath sounds. No decreased breath sounds, wheezing, rhonchi or rales. Abdominal:      General: Bowel sounds are normal.      Palpations: Abdomen is soft. Tenderness: There is no abdominal tenderness. Musculoskeletal:      Cervical back: Normal range of motion and neck supple. Right lower leg: Edema present. Left lower leg: Edema present. Lymphadenopathy:      Cervical: No cervical adenopathy. Skin:     General: Skin is warm and dry. Neurological:      Mental Status: She is alert and oriented to person, place, and time. Disclaimer: The patient understands our medical plan. Alternatives have been explained and offered. The risks, benefits and significant side effects of all medications have been reviewed. Anticipated time course and progression of condition reviewed. All questions have been addressed. She is encouraged to employ the information provided in the after visit summary, which was reviewed. Where applicable, she is instructed to call the clinic if she has not been notified either by phone or through [address] with the results of her tests or with an appointment plan for any referrals within 1 week(s). No news is not good news; it's no news. The patient  is to call if her condition worsens or fails to improve or if significant side effects are experienced. Aspects of this note may have been generated using voice recognition software. Despite editing, there may be unrecognized errors.        Cj Leavitt NP

## 2022-01-21 ENCOUNTER — VIRTUAL VISIT (OUTPATIENT)
Dept: FAMILY MEDICINE CLINIC | Age: 67
End: 2022-01-21

## 2022-01-21 ENCOUNTER — TELEPHONE (OUTPATIENT)
Dept: ORTHOPEDIC SURGERY | Age: 67
End: 2022-01-21

## 2022-01-21 DIAGNOSIS — Z91.199 NO-SHOW FOR APPOINTMENT: Primary | ICD-10-CM

## 2022-01-21 NOTE — TELEPHONE ENCOUNTER
Spoke with patient. Patient states that the medication prescribed by Dr. Caesar Rondey is making her dizzy. Patient states that [facility] called [facility], and [facility]. Patient states that she has not taken the medication in over 5 days. Patient states that she is not longer dizzy since she has discontinued the medication. Instructed patient to call DR. Evans office to inform of the dizziness from the medication.

## 2022-01-21 NOTE — TELEPHONE ENCOUNTER
Patient called to inform the [facility] that another provider has placed her on makes her dizzy. I explained that she would need to contact that provider to discuss. She went on telling me her ankles are swollen but the medication makes her dizzy. She states she cannot get through to other provider. She then informed she has not been able to attend physical therapy order back in October because they are \"full\" and she cannot go to [facility] due to [employer] I asked her what it was she would like me to do and she replied \"someone tell me what I can do regarding this ankle swelling\". Please advise.      Patient [phone]

## 2022-02-08 ENCOUNTER — OFFICE VISIT (OUTPATIENT)
Dept: FAMILY MEDICINE CLINIC | Age: 67
End: 2022-02-08
Payer: MEDICARE

## 2022-02-08 VITALS
WEIGHT: 211 LBS | SYSTOLIC BLOOD PRESSURE: 156 MMHG | HEIGHT: 66 IN | RESPIRATION RATE: 20 BRPM | HEART RATE: 85 BPM | OXYGEN SATURATION: 98 % | DIASTOLIC BLOOD PRESSURE: 92 MMHG | BODY MASS INDEX: 33.91 KG/M2 | TEMPERATURE: 97.8 F

## 2022-02-08 DIAGNOSIS — Z12.31 ENCOUNTER FOR SCREENING MAMMOGRAM FOR MALIGNANT NEOPLASM OF BREAST: ICD-10-CM

## 2022-02-08 DIAGNOSIS — R73.09 ELEVATED GLUCOSE: ICD-10-CM

## 2022-02-08 DIAGNOSIS — I10 ESSENTIAL HYPERTENSION: Primary | ICD-10-CM

## 2022-02-08 DIAGNOSIS — E66.9 OBESITY, CLASS I, BMI 30-34.9: ICD-10-CM

## 2022-02-08 PROCEDURE — G8417 CALC BMI ABV UP PARAM F/U: HCPCS | Performed by: NURSE PRACTITIONER

## 2022-02-08 PROCEDURE — G8753 SYS BP > OR = 140: HCPCS | Performed by: NURSE PRACTITIONER

## 2022-02-08 PROCEDURE — 1101F PT FALLS ASSESS-DOCD LE1/YR: CPT | Performed by: NURSE PRACTITIONER

## 2022-02-08 PROCEDURE — G0463 HOSPITAL OUTPT CLINIC VISIT: HCPCS | Performed by: NURSE PRACTITIONER

## 2022-02-08 PROCEDURE — G9899 SCRN MAM PERF RSLTS DOC: HCPCS | Performed by: NURSE PRACTITIONER

## 2022-02-08 PROCEDURE — G8536 NO DOC ELDER MAL SCRN: HCPCS | Performed by: NURSE PRACTITIONER

## 2022-02-08 PROCEDURE — 3017F COLORECTAL CA SCREEN DOC REV: CPT | Performed by: NURSE PRACTITIONER

## 2022-02-08 PROCEDURE — G8432 DEP SCR NOT DOC, RNG: HCPCS | Performed by: NURSE PRACTITIONER

## 2022-02-08 PROCEDURE — 99214 OFFICE O/P EST MOD 30 MIN: CPT | Performed by: NURSE PRACTITIONER

## 2022-02-08 PROCEDURE — 1090F PRES/ABSN URINE INCON ASSESS: CPT | Performed by: NURSE PRACTITIONER

## 2022-02-08 PROCEDURE — G8427 DOCREV CUR MEDS BY ELIG CLIN: HCPCS | Performed by: NURSE PRACTITIONER

## 2022-02-08 PROCEDURE — G8400 PT W/DXA NO RESULTS DOC: HCPCS | Performed by: NURSE PRACTITIONER

## 2022-02-08 PROCEDURE — G8755 DIAS BP > OR = 90: HCPCS | Performed by: NURSE PRACTITIONER

## 2022-02-08 NOTE — PROGRESS NOTES
Dalia Chavez is a 77 y.o. female who was seen in clinic today (2/8/2022) for Peripheral Edema (states she stopped taking maxzide 9 days ago due to dizziness. states since stopping medication the dizziness has resolved. )    Assessment & Plan:   Diagnoses and all orders for this visit:    1. Essential hypertension  -     LIPID PANEL; Future  -     METABOLIC PANEL, COMPREHENSIVE; Future  -     CBC WITH AUTOMATED DIFF; Future    2. Encounter for screening mammogram for malignant neoplasm of breast  -     SCARLET MAMMO BI SCREENING INCL CAD; Future    3. Obesity, Class I, BMI 30-34.9    4. Elevated glucose  -     HEMOGLOBIN A1C WITH EAG; Future      Instructed to begin taking maxzide 1/2 tab daily. Continue to stay hydrated. Patient instructed to return in the morning for fasting labs ordered today    Subjective:   Patient states she has been having increased swelling since she discontinued medication. Reports the maxzide caused her to have vertigo. Patient has been on maxzide since 7/2019. Medication was helping with the swelling. Comments she has been having increased stress with her daughter missing and she has her grandson. Reports her daughter informed her son she was going to the store and never returned. Patient states was seen by optometrist and informed she just needed reading glasses. Lab Results   Component Value Date/Time    Sodium 139 06/15/2020 11:12 AM    Potassium 4.2 06/15/2020 11:12 AM    Chloride 100 06/15/2020 11:12 AM    CO2 27 06/15/2020 11:12 AM    Anion gap 12.0 06/15/2020 11:12 AM    Glucose 96 06/15/2020 11:12 AM    BUN 19 06/15/2020 11:12 AM    Creatinine 0.8 06/15/2020 11:12 AM    BUN/Creatinine ratio 16 01/09/2020 10:53 AM    GFR est AA >60 01/09/2020 10:53 AM    GFR est non-AA 53 (L) 01/09/2020 10:53 AM    Calcium 10.3 06/15/2020 11:12 AM    Bilirubin, total 0.3 01/09/2020 10:53 AM    Alk.  phosphatase 104 01/09/2020 10:53 AM    Protein, total 8.1 01/09/2020 10:53 AM    Albumin 3.4 01/09/2020 10:53 AM    Globulin 4.7 (H) 01/09/2020 10:53 AM    A-G Ratio 0.7 (L) 01/09/2020 10:53 AM    ALT (SGPT) 30 01/09/2020 10:53 AM    AST (SGOT) 26 01/09/2020 10:53 AM     Lab Results   Component Value Date/Time    Cholesterol, total 150 01/09/2020 10:53 AM    HDL Cholesterol 47 01/09/2020 10:53 AM    LDL, calculated 85.8 01/09/2020 10:53 AM    VLDL, calculated 17.2 01/09/2020 10:53 AM    Triglyceride 86 01/09/2020 10:53 AM    CHOL/HDL Ratio 3.2 01/09/2020 10:53 AM     Lab Results   Component Value Date/Time    Hemoglobin A1c 6.3 (H) 01/09/2020 10:53 AM    Hemoglobin A1c (POC) 5.5 10/10/2019 11:21 PM     No results found for: Chares Rule, XQVID3, XQVID, VD3RIA, URPF91EBMEA    No results found for: WBC, WBCLT, HGBPOC, HGB, HGBP, HCTPOC, HCT, PHCT, RBCH, PLT, MCV, HGBEXT, HCTEXT, PLTEXT    Wt Readings from Last 3 Encounters:   02/08/22 211 lb (95.7 kg)   12/16/21 213 lb (96.6 kg)   10/19/21 208 lb (94.3 kg)     Temp Readings from Last 3 Encounters:   02/08/22 97.8 °F (36.6 °C) (Temporal)   12/16/21 99 °F (37.2 °C) (Temporal)   10/19/21 98.4 °F (36.9 °C) (Temporal)     BP Readings from Last 3 Encounters:   12/16/21 (!) 150/86   04/07/21 135/75   03/16/21 (!) 163/89     Pulse Readings from Last 3 Encounters:   02/08/22 85   12/16/21 82   10/19/21 100       Prior to Admission medications    Medication Sig Start Date End Date Taking? Authorizing Provider   diclofenac EC (VOLTAREN) 50 mg EC tablet Take 1 Tablet by mouth two (2) times daily as needed for Pain (take with food). 12/16/21   Ignacio TIPTON NP   famotidine (PEPCID) 40 mg tablet Take 1 Tablet by mouth two (2) times daily as needed for Heartburn (take with diclofenac).  12/16/21   Ignacio TIPTON NP   triamterene-hydroCHLOROthiazide (MAXZIDE) 37.5-25 mg per tablet TAKE 1 TABLET BY MOUTH EVERY DAY **NEED NEW INSURANCE  Patient not taking: Reported on 2/8/2022 11/3/21   Ignacio TIPTON NP   methylPREDNISolone (MEDROL DOSEPACK) 4 mg tablet Per dose pack instructions  Patient not taking: Reported on 9/8/2021 3/16/21 2/8/22  Stephanie Morocho MD   triamcinolone (ARISTOCORT) 0.5 % topical cream Apply  to affected area two (2) times a day. use thin layer 9/4/20   Adalgisa TIPTON NP   diclofenac (VOLTAREN) 1 % gel Apply  to affected area four (4) times daily. 10/10/19   Amanda Ambrosio NP         The following sections were reviewed & updated as appropriate: PMH, PSH, FH, and SH. Review of Systems       Objective:     Visit Vitals  Pulse 85   Temp 97.8 °F (36.6 °C) (Temporal)   Resp 20   Ht 5' 6\" (1.676 m)   Wt 211 lb (95.7 kg)   SpO2 98%   BMI 34.06 kg/m²      Physical Exam      Disclaimer: The patient understands our medical plan. Alternatives have been explained and offered. The risks, benefits and significant side effects of all medications have been reviewed. Anticipated time course and progression of condition reviewed. All questions have been addressed. She is encouraged to employ the information provided in the after visit summary, which was reviewed. Where applicable, she is instructed to call the clinic if she has not been notified either by phone or through [address] with the results of her tests or with an appointment plan for any referrals within 1 week(s). No news is not good news; it's no news. The patient  is to call if her condition worsens or fails to improve or if significant side effects are experienced. Aspects of this note may have been generated using voice recognition software. Despite editing, there may be unrecognized errors.        Maicol Peterson NP

## 2022-02-08 NOTE — LETTER
NOTIFICATION RETURN TO WORK    2/8/2022 5:16 PM    Ms. Miguel Garcia  [address] [place] [address]  [phone]      To Whom It May Concern:    Miguel Garcia is currently under the care of [address] . She will return to work on: 2/14/2022    If there are questions or concerns please have the patient contact our office.         Sincerely,      Tonya Garcia NP

## 2022-02-09 ENCOUNTER — APPOINTMENT (OUTPATIENT)
Dept: FAMILY MEDICINE CLINIC | Age: 67
End: 2022-02-09

## 2022-02-09 ENCOUNTER — HOSPITAL ENCOUNTER (OUTPATIENT)
Dept: LAB | Age: 67
Discharge: HOME | End: 2022-02-09

## 2022-02-09 DIAGNOSIS — R73.09 ELEVATED GLUCOSE: ICD-10-CM

## 2022-02-09 DIAGNOSIS — I10 ESSENTIAL HYPERTENSION: ICD-10-CM

## 2022-02-09 LAB
ALBUMIN SERPL-MCNC: 3.7 G/DL (ref 3.4–5)
ALBUMIN/GLOB SERPL: 0.7 {RATIO} (ref 0.8–1.7)
ALP SERPL-CCNC: 115 U/L (ref 45–117)
ALT SERPL-CCNC: 36 U/L (ref 13–56)
ANION GAP SERPL CALC-SCNC: 6 MMOL/L (ref 3–18)
AST SERPL-CCNC: 21 U/L (ref 10–38)
BASOPHILS # BLD: 0 K/UL (ref 0–0.1)
BASOPHILS NFR BLD: 0 % (ref 0–2)
BILIRUB SERPL-MCNC: 0.2 MG/DL (ref 0.2–1)
BUN SERPL-MCNC: 16 MG/DL (ref 7–18)
BUN/CREAT SERPL: 17 (ref 12–20)
CALCIUM SERPL-MCNC: 9.6 MG/DL (ref 8.5–10.1)
CHLORIDE SERPL-SCNC: 106 MMOL/L (ref 100–111)
CHOLEST SERPL-MCNC: 164 MG/DL
CO2 SERPL-SCNC: 28 MMOL/L (ref 21–32)
CREAT SERPL-MCNC: 0.95 MG/DL (ref 0.6–1.3)
DIFFERENTIAL METHOD BLD: ABNORMAL
EOSINOPHIL # BLD: 0.2 K/UL (ref 0–0.4)
EOSINOPHIL NFR BLD: 2 % (ref 0–5)
ERYTHROCYTE [DISTWIDTH] IN BLOOD BY AUTOMATED COUNT: 14.5 % (ref 11.6–14.5)
EST. AVERAGE GLUCOSE BLD GHB EST-MCNC: 163 MG/DL
GLOBULIN SER CALC-MCNC: 5 G/DL (ref 2–4)
GLUCOSE SERPL-MCNC: 122 MG/DL (ref 74–99)
HBA1C MFR BLD: 7.3 % (ref 4.2–5.6)
HCT VFR BLD AUTO: 41.5 % (ref 35–45)
HDLC SERPL-MCNC: 44 MG/DL (ref 40–60)
HDLC SERPL: 3.7 {RATIO} (ref 0–5)
HGB BLD-MCNC: 12.7 G/DL (ref 12–16)
IMM GRANULOCYTES # BLD AUTO: 0.1 K/UL (ref 0–0.04)
IMM GRANULOCYTES NFR BLD AUTO: 1 % (ref 0–0.5)
LDLC SERPL CALC-MCNC: 94.4 MG/DL (ref 0–100)
LIPID PROFILE,FLP: NORMAL
LYMPHOCYTES # BLD: 3 K/UL (ref 0.9–3.6)
LYMPHOCYTES NFR BLD: 28 % (ref 21–52)
MCH RBC QN AUTO: 26 PG (ref 24–34)
MCHC RBC AUTO-ENTMCNC: 30.6 G/DL (ref 31–37)
MCV RBC AUTO: 85 FL (ref 78–100)
MONOCYTES # BLD: 0.4 K/UL (ref 0.05–1.2)
MONOCYTES NFR BLD: 4 % (ref 3–10)
NEUTS SEG # BLD: 6.8 K/UL (ref 1.8–8)
NEUTS SEG NFR BLD: 65 % (ref 40–73)
NRBC # BLD: 0 K/UL (ref 0–0.01)
NRBC BLD-RTO: 0 PER 100 WBC
PLATELET # BLD AUTO: 394 K/UL (ref 135–420)
PMV BLD AUTO: 10.8 FL (ref 9.2–11.8)
POTASSIUM SERPL-SCNC: 3.9 MMOL/L (ref 3.5–5.5)
PROT SERPL-MCNC: 8.7 G/DL (ref 6.4–8.2)
RBC # BLD AUTO: 4.88 M/UL (ref 4.2–5.3)
SODIUM SERPL-SCNC: 140 MMOL/L (ref 136–145)
TRIGL SERPL-MCNC: 128 MG/DL (ref ?–150)
VLDLC SERPL CALC-MCNC: 25.6 MG/DL
WBC # BLD AUTO: 10.6 K/UL (ref 4.6–13.2)

## 2022-02-09 PROCEDURE — 80053 COMPREHEN METABOLIC PANEL: CPT

## 2022-02-09 PROCEDURE — 85025 COMPLETE CBC W/AUTO DIFF WBC: CPT

## 2022-02-09 PROCEDURE — 83036 HEMOGLOBIN GLYCOSYLATED A1C: CPT

## 2022-02-09 PROCEDURE — 80061 LIPID PANEL: CPT

## 2022-02-11 ENCOUNTER — TELEPHONE (OUTPATIENT)
Dept: FAMILY MEDICINE CLINIC | Age: 67
End: 2022-02-11

## 2022-02-11 DIAGNOSIS — E11.9 NEW ONSET TYPE 2 DIABETES MELLITUS (HCC): Primary | ICD-10-CM

## 2022-02-11 RX ORDER — METFORMIN HYDROCHLORIDE 500 MG/1
500 TABLET, EXTENDED RELEASE ORAL
Qty: 30 TABLET | Refills: 2 | Status: SHIPPED | OUTPATIENT
Start: 2022-02-11

## 2022-04-20 RX ORDER — TRIAMTERENE/HYDROCHLOROTHIAZID 37.5-25 MG
1 TABLET ORAL DAILY
Qty: 90 TABLET | Refills: 1 | Status: SHIPPED | OUTPATIENT
Start: 2022-04-20

## 2022-04-20 NOTE — TELEPHONE ENCOUNTER
Last Visit: 2/8/22 with NP Katelin Patiño  Next Appointment: Advised to follow-up in 3 months  Previous Refill Encounter(s): 11/3/21 #90 with 1 refill    Requested Prescriptions     Pending Prescriptions Disp Refills    triamterene-hydroCHLOROthiazide (MAXZIDE) 37.5-25 mg per tablet 90 Tablet 1     Sig: Take 1 Tablet by mouth daily.

## 2022-05-02 ENCOUNTER — TELEPHONE (OUTPATIENT)
Dept: PHYSICAL THERAPY | Age: 67
End: 2022-05-02

## 2022-05-09 ENCOUNTER — TELEPHONE (OUTPATIENT)
Dept: ORTHOPEDIC SURGERY | Age: 67
End: 2022-05-09

## 2022-05-09 ENCOUNTER — HOSPITAL ENCOUNTER (OUTPATIENT)
Dept: PHYSICAL THERAPY | Age: 67
Discharge: HOME | End: 2022-05-09

## 2022-05-09 PROCEDURE — 97161 PT EVAL LOW COMPLEX 20 MIN: CPT

## 2022-05-09 PROCEDURE — 97535 SELF CARE MNGMENT TRAINING: CPT

## 2022-05-09 NOTE — TELEPHONE ENCOUNTER
Tried to call patient to see what type of PT is being questioned. A female picked up and stated that Ms. Kareen Hawkins is at a doctors office and will call back. If patient calls back, she will need to be seen by Dr. Scott Ibanez, as she has not been seen since October.

## 2022-05-09 NOTE — TELEPHONE ENCOUNTER
[address] from [facility] called needing clarification on the type therapy patient is needing. Office # [phone].

## 2022-05-09 NOTE — PROGRESS NOTES
[facility]  [address] [facility] [address]  [place], [address]  [phone] fax    Plan of Care/ Statement of Necessity for Physical Therapy Services    Patient name: Rosanna Stevenson Start of Care: 2022   Referral source: Arlette Menard MD : [id number]    Medical Diagnosis: Lymphedema, not elsewhere classified [I89.0]  Payor: /  Onset Date:2022    Treatment Diagnosis: Bilateral ankle, bilateral lower extremity mixed edema   Prior Hospitalization: see medical history Provider#: [id number]   Medications: Verified on Patient summary List    Comorbidities: BIMI >30, HTN   Prior Level of Function: Pain and edema chronic. Works part time in patient care. The Plan of Care and following information is based on the information from the initial evaluation. Assessment/ key information: Patient is 77year old female with c/o bilateral lower extremity swelling and ankle pain. Patient previously known to this clinic. Underwent bout of Decongestive Therapy in 2020 with poor compliance to compression therapy noted; patient endorses difficulty tolerating wear of compression bandages as well as compression stockings. Patient presenting to clinic today wearing Chris hose stating they are comfortable. Patient skin intact with hemosiderin staining though patient reporting coloration is improving. sensation intact. Edema non-pitting. Patient with signs and symptoms consistent with mixed vascular edema and bilateral lower extremity lymphedema (Stage 3). PT provided extensive education on need for consistency in compliance with compression therapy to justify undergoing CDT which patient was previously unable to tolerate. PT recommending patient be under care for traditional physical therapy to improve her ankle pain.  PT provided extensive education in long term management strategies to manage edema including continued wear of Chris hose or other compression garments patient can tolerate, decongestive exercise, and activity modification. Patient verbalizes understanding to all education provided and verbalizes agreement to the above plan. Evaluation otherwise showing decreased ankle range of motion, particularly in dorsiflexion and eversion. Heel cord mobility decreased, right > let. Functional strength impaired bilaterally in addition to reduced hamstring flexibility. Point tenderness present at gastroc complex musculotendinous junction, Patient with signs and symptoms consistent with mechanical ankle pain secondary to soft tissue dysfunction. Evaluation Complexity History LOW Complexity : Zero comorbidities / personal factors that will impact the outcome / POC; Examination LOW Complexity : 1-2 Standardized tests and measures addressing body structure, function, activity limitation and / or participation in recreation  ;Presentation MEDIUM Complexity : Evolving with changing characteristics  ; Clinical Decision Making MEDIUM Complexity : FOTO score of 26-74  Overall Complexity Rating: MEDIUM  Problem List: decrease ROM, decrease strength, edema affecting function, impaired gait/ balance, decrease ADL/ functional abilitiies, decrease activity tolerance, decrease flexibility/ joint mobility and decrease transfer abilities   Treatment Plan may include any combination of the following: Therapeutic exercise, Therapeutic activities, Manual therapy, Patient education, Self Care training and Home safety training  Patient / Family readiness to learn indicated by: asking questions and interest  Persons(s) to be included in education: patient (P)  Barriers to Learning/Limitations: None  Patient Goal (s): \"Help me feel better  Patient Self Reported Health Status: fair  Rehabilitation Potential: fair    Short Term Goals: To be accomplished in 2 weeks:  1. Patient to be independent in HEP to maximize therapeutic effect of care plan. Evaluation: to be provided    2.  Patient to report daily wear of compression stockings to facilitate long term management of lower extremity edema. Evaluation: just received tolerable garments, 1 day of wear at evaluation     Long Term Goals: To be accomplished in 4 weeks:  1. Patient to improve shanice ankle dorsiflexion to 10 deg or better to improve functional mobility mechanics and increase tolerance. Evaluation: right LE 7 deg, left 6 deg    2. Patient to demonstrate 10 single leg heel raises with UE support to indicate improved functional strength in stair negotiation and ambulation  Evaluation: right x3, left x5    3. Patient to improve FOTO score to 48 to indicate improved functional capacity and progress towards PLOF. Evaluation: 39    Frequency / Duration: Patient to be seen 2 times per week for 4 weeks. Patient/ Caregiver education and instruction: Diagnosis, prognosis, self care, activity modification and exercises   [x]  Plan of care has been reviewed with PTA    Certification Period: 5/9/2022 - 6/6/2022  [facility], PT 5/9/2022 12:35 PM    ________________________________________________________________________    I certify that the above Therapy Services are being furnished while the patient is under my care. I agree with the treatment plan and certify that this therapy is necessary.     [de-identified] Signature:____________________  Date:____________Time: _________     Ira Williamson MD  Please sign and return to [facility] [address] [place] [address] & [facility] [address]  [address] [facility] [address] [facility] [address]  [place], [address] [facility] [address]  [phone] fax

## 2022-05-09 NOTE — PROGRESS NOTES
Lymphedema EVAL/DAILY NOTE    Patient Name: Trinidad Saenz  TGHZ:  : 1955  [x]  Patient  Verified  Payor: /    In time: 12:50  Out time:13:45  Total Treatment Time (min): 55  Total Timed Codes (min): 30  1:1 Treatment Time (MC/BCBS only): 25   Visit #: 1 of 8    Referring Provider: Marlon Cancino MD  Next Appointment: TBD  Treatment Area: Lymphedema, not elsewhere classified [I89.0]    Pain Level (0-10 scale): 0    Personal Goal:  \"Help me feel better\"    History of Present Illness: Chronic pain and swelling in shanice ankles. Non-specific about cause. States swelling improves with rest and elevation. Has noticed some skin darkening at ankles but is slowly improving as of late. Patient previously known to this clinic for CDT in which she had poor tolerance to compression therapy. 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 [x]Yes  []No  If no, who will assist patient? Current or Previous Compression Garment(s):   [x]Stocking []Sleeve  [] Pantyhose                          [] Glove/gauntlet/toe                           []Brand:                          []mmHg:   [] Size:     Compression Pump:  []Yes  [x]No      Limitations/Prior level of functioning: Decreased ambulatory/standing tolerance, decreased occupational capacity    Subjective functional status:    Lymphedema extremity:    []Left arm    []Right Arm    [x]Left Leg    [x]Right Leg      Present Symptoms/History:    History of infections? No   History of leaking fluid? No   Currently on antibiotics? []Yes [x]No      Do you take diuretics for lymphedema? []Yes [x]No      Do you take any other drugs for lymphedema?  No   Previous lymphedema treatment: Yes, CDT 2020    Precautions/Indications: BMI >30, HTN    Diagnostic tests (imaging/bone scan/labs): N/A       OBJECTIVE:   Edema:  []min []moderate  [] severe [] Pitting    Skin Integrity      Sensation:  [x]normal   [] sensitive    [] decreased  Light touch/Sharp/Dull                Color:  []normal []red  [] Pink    Texture:  []Soft []Brawny []Fibrotic []Boggy    Skin integrity:  [x] Hyperpigmentation []Hyperkeratosis []Papillomas  []Lymphorrhea  Scars/Burns/incisions: N/A               Posture:    ROM: see paper Foot/Ankle evaluation form     Strength:  see paper Foot/Ankle evaluation form_    25 min [x]Eval                  []Re-Eval       30 min Self Care/Home Management: Education regarding role of Complete Decongestive Therapy (CDT) in managing condition, phases of CDT including compression bandaging, garments, and Manual Lymph Drainage (MLD)     Education on role of traditional PT to improve ankle pain and tendonitis   Rationale: To promote patient's knowledge of condition and understanding of lymphedema treatment protocol to maximize compliance with therapy and promote independence with home management. With   [] TE   [] TA   [] neuro   [] other: Patient Education: [] Review HEP    [] Progressed/Changed HEP based on:   [] positioning   [] body mechanics   [] transfers   [] heat/ice application    [x] other: role of compression therapy in improving lower extremity edema     Other Objective/Functional Measures: see Foot/Ankle evaluation     Recommended bandaging: N/A  Planned bandages to order: N/A  Recommend compression level: [x]20-30 mmHg  []30-40 mmHg []40-50 mmHg []50-60 mmHg    Pain Level (0-10 scale) post treatment: 0    ASSESSMENT/Changes in Function: Patient is 77year old female with c/o bilateral lower extremity swelling and ankle pain. Patient previously known to this clinic.  Underwent bout of Decongestive Therapy in October 2020 with poor compliance to compression therapy noted; patient endorses difficulty tolerating wear of compression bandages as well as compression stockings. Patient presenting to clinic today wearing Chris hose stating they are comfortable. Patient skin  intact with hemosiderin staining though patient reporting coloration is improving. sensation intact. Edema  non-pitting. Patient with signs and symptoms consistent with mixed vascular edema and bilateral lower extremity lymphedema (Stage 3). PT provided extensive education on need for consistency in compliance with compression therapy to justify undergoing CDT which patient was previously unable to tolerate. PT recommending patient be under care for traditional physical therapy to improve her ankle pain. PT provided extensive education in long term management strategies to manage edema including continued wear of Chris hose or other compression garments patient can tolerate, decongestive exercise, and activity modification. Patient verbalizes understanding to all education provided and verbalizes agreement to the above plan. Evaluation otherwise showing decreased ankle range of motion, particularly in dorsiflexion and eversion. Heel cored mobility decreased, right > let. Functional strength impaired bilaterally in addition to reduced hamstring flexibility. Point tenderness present at gastroc complex musculotendinous junction, Patient with signs and symptoms consistent with mechanical ankle pain secondary to soft tissue dysfunction. Short Term Goals: To be accomplished in 2 weeks:  1. Patient to be independent in HEP to maximize therapeutic effect of care plan. Evaluation: to be provided     2. Patient to report daily wear of compression stockings to facilitate long term management of lower extremity edema. Evaluation: just received tolerable garments, 1 day of wear at evaluation     Long Term Goals: To be accomplished in 4 weeks:  1.  Patient to improve shanice ankle dorsiflexion to 10 deg or better to improve functional mobility mechanics and increase tolerance. Evaluation: right LE 7 deg, left 6 deg    2. Patient to demonstrate 10 single leg heel raises with UE support to indicate improved functional strength in stair negotiation and ambulation  Evaluation: right x3, left x5    3. Patient to improve FOTO score to 48 to indicate improved functional capacity and progress towards PLOF.   Evaluation: 39    Justification for Eval Code Complexity:   Patient History (low 0, mod 1-2, high 3-4): low   Examination (low 1-2, mod 3+, high 4+): low  Clinical Presentation (low: stable/uncomplicated; mod: evolving; high: unstable/unpredictable): mod  Clinical Decision Making (low , mod 26-74, high 1-25): mod           PLAN  [x]  Upgrade activities as tolerated     []  Continue plan of care  []  Update interventions per flow sheet       []  Discharge due to:_  [x]  Other:_ initiate POC      Danny Chavez, PT 5/9/2022  12:31 PM

## 2022-05-13 ENCOUNTER — HOSPITAL ENCOUNTER (OUTPATIENT)
Dept: PHYSICAL THERAPY | Age: 67
Discharge: HOME | End: 2022-05-13

## 2022-05-13 PROCEDURE — 97140 MANUAL THERAPY 1/> REGIONS: CPT

## 2022-05-13 PROCEDURE — 97110 THERAPEUTIC EXERCISES: CPT

## 2022-05-13 NOTE — PROGRESS NOTES
PT DAILY TREATMENT NOTE     Patient Name: Elisa Bruno  Date:2022  : 1955  [x]  Patient  Verified  Payor: /    In time:1043  Out time:1124  Total Treatment Time (min): 41  Visit #: 2 of 8      Treatment Area: Left ankle pain [M25.572]  Pain in right ankle [M25.571]    SUBJECTIVE  Pain Level (0-10 scale): 6  Any medication changes, allergies to medications, adverse drug reactions, diagnosis change, or new procedure performed?: [x] No    [] Yes (see summary sheet for update)  Subjective functional status/changes:   [] No changes reported  The pt reports pain is limiting her ability for work. OBJECTIVE      33 min Therapeutic Exercise:  [x] See flow sheet : + HEP   Rationale: increase ROM and increase strength to improve the patients ability to perform ADL    8 min Manual Therapy:  Pt prone- performed Graston with GT5 with minimal pressure to B achilles region. The manual therapy interventions were performed at a separate and distinct time from the therapeutic activities interventions. Rationale: decrease pain, increase ROM and increase tissue extensibility to perform ADL            With   [] TE   [] TA   [] neuro   [] other: Patient Education: [x] Review HEP    [] Progressed/Changed HEP based on:   [] positioning   [] body mechanics   [] transfers   [] heat/ice application    [] other:      Other Objective/Functional Measures: initiated first f/u session     Pain Level (0-10 scale) post treatment: 6    ASSESSMENT/Changes in Function: The pt with fair tolerance to today's treatment. Limited tolerance to gentle Graston of B achilles but no complications noted following. The pt was issued HEP.      Patient will continue to benefit from skilled PT services to modify and progress therapeutic interventions, address functional mobility deficits, address ROM deficits, address strength deficits, analyze and address soft tissue restrictions and analyze and cue movement patterns to attain remaining goals.     []  See Plan of Care  []  See progress note/recertification  []  See Discharge Summary         Progress towards goals / Updated goals:  Short Term Goals: To be accomplished in 2 weeks:  1. Patient to be independent in HEP to maximize therapeutic effect of care plan. Evaluation: to be provided    Current: issued HEP on 5-13-22  2. Patient to report daily wear of compression stockings to facilitate long term management of lower extremity edema. Evaluation: just received tolerable garments, 1 day of wear at evaluation     Long Term Goals: To be accomplished in 4 weeks:  1. Patient to improve shanice ankle dorsiflexion to 10 deg or better to improve functional mobility mechanics and increase tolerance. Evaluation: right LE 7 deg, left 6 deg    2. Patient to demonstrate 10 single leg heel raises with UE support to indicate improved functional strength in stair negotiation and ambulation  Evaluation: right x3, left x5    3. Patient to improve FOTO score to 48 to indicate improved functional capacity and progress towards PLOF.   Evaluation: 39    PLAN  []  Upgrade activities as tolerated     []  Continue plan of care  []  Update interventions per flow sheet       []  Discharge due to:_  []  Other:_      Jayne Curtis, PT 5/13/2022  10:46 AM    Future Appointments   Date Time Provider Sun Ortiz   5/16/2022  9:45 AM Duwaine Brim, PTA [facility]   5/18/2022 11:15 AM Duwaine Brim, PTA [facility]   5/23/2022  9:45 AM Duwaine Brim, PTA [facility]   5/25/2022 12:45 PM Duwaine Brim, PTA [facility]   5/31/2022  9:00 AM Cady Kelly, PT [facility]   6/2/2022 10:30 AM Tru Blancas, PT [facility]   7/5/2022  8:00 AM [facility] 1 2D [facility]

## 2022-05-16 ENCOUNTER — TELEPHONE (OUTPATIENT)
Dept: PHYSICAL THERAPY | Age: 67
End: 2022-05-16

## 2022-05-16 ENCOUNTER — APPOINTMENT (OUTPATIENT)
Dept: PHYSICAL THERAPY | Age: 67
End: 2022-05-16

## 2022-05-16 NOTE — TELEPHONE ENCOUNTER
A1c 11, patient has recently resumed her diabetes medications, she will continue and keep her appointment with Mrs. Haq in diabetes on April 20th   BURNING ON THE BACK OF HER LEG, WCB ABOUT WED.  APPT

## 2022-05-18 ENCOUNTER — APPOINTMENT (OUTPATIENT)
Dept: PHYSICAL THERAPY | Age: 67
End: 2022-05-18

## 2022-05-23 ENCOUNTER — TELEPHONE (OUTPATIENT)
Dept: ORTHOPEDIC SURGERY | Age: 67
End: 2022-05-23

## 2022-05-23 ENCOUNTER — APPOINTMENT (OUTPATIENT)
Dept: PHYSICAL THERAPY | Age: 67
End: 2022-05-23

## 2022-05-23 NOTE — TELEPHONE ENCOUNTER
Patient stared therapy but it started to burn so she didn't finish it. She wants to go again in June and wants to know does she need a new referral.    Please give her a call.

## 2022-05-25 ENCOUNTER — APPOINTMENT (OUTPATIENT)
Dept: PHYSICAL THERAPY | Age: 67
End: 2022-05-25

## 2022-05-31 ENCOUNTER — APPOINTMENT (OUTPATIENT)
Dept: PHYSICAL THERAPY | Age: 67
End: 2022-05-31

## 2022-06-02 ENCOUNTER — APPOINTMENT (OUTPATIENT)
Dept: PHYSICAL THERAPY | Age: 67
End: 2022-06-02

## 2022-07-05 ENCOUNTER — HOSPITAL ENCOUNTER (OUTPATIENT)
Dept: MAMMOGRAPHY | Age: 67
Discharge: HOME | End: 2022-07-05
Attending: NURSE PRACTITIONER
Payer: MEDICARE

## 2022-07-05 ENCOUNTER — TELEPHONE (OUTPATIENT)
Dept: ORTHOPEDIC SURGERY | Age: 67
End: 2022-07-05

## 2022-07-05 DIAGNOSIS — Z12.31 ENCOUNTER FOR SCREENING MAMMOGRAM FOR MALIGNANT NEOPLASM OF BREAST: ICD-10-CM

## 2022-07-05 DIAGNOSIS — I89.0 LYMPHEDEMA OF BOTH LOWER EXTREMITIES: ICD-10-CM

## 2022-07-05 DIAGNOSIS — M67.88 ACHILLES TENDONOSIS OF RIGHT LOWER EXTREMITY: Primary | ICD-10-CM

## 2022-07-05 PROCEDURE — 77063 BREAST TOMOSYNTHESIS BI: CPT

## 2022-07-05 NOTE — TELEPHONE ENCOUNTER
Patient came in asking if she can get a referral for PT for both her ankles. She said you can refer to her office notes as to why she needs it. Any questions please give her a call.

## 2022-07-06 ENCOUNTER — TELEPHONE (OUTPATIENT)
Dept: PHYSICAL THERAPY | Age: 67
End: 2022-07-06

## 2022-07-06 NOTE — PROGRESS NOTES
Orders Placed This Encounter    REFERRAL TO PHYSICAL THERAPY          ICD-10-CM ICD-9-CM    1.  Achilles tendonosis of right lower extremity  M67.88 726.71 REFERRAL TO PHYSICAL THERAPY   2. Lymphedema of both lower extremities  I89.0 457.1 REFERRAL TO PHYSICAL THERAPY         Rx sent to the pharmacy        Julio Cesar Siddiqui MD  7/5/2022  8:03 PM

## 2022-07-11 ENCOUNTER — TELEPHONE (OUTPATIENT)
Dept: PHYSICAL THERAPY | Age: 67
End: 2022-07-11

## 2022-07-27 NOTE — PROGRESS NOTES
[facility]  [address] [facility] [address]  [place], [address]  [phone] fax    Physical Therapy Discharge Summary  Patient name: Amanda Antonio Start of Care: 2022   Referral source: Elmer Hernandes MD : 1955               Medical Diagnosis: Lymphedema, not elsewhere classified [I89.0]  Payor: /  Onset Date:2022               Treatment Diagnosis: Bilateral ankle, bilateral lower extremity mixed edema   Prior Hospitalization: see medical history Provider#: [id number]   Medications: Verified on Patient summary List    Comorbidities: BIMI >30, HTN   Prior Level of Function: Pain and edema chronic. Works part time in patient care. Visits from Start of Care: 2    Missed Visits: 1  Reporting Period : 2022 to 2022      Summary of Care: ALL GOALS UNMET DUE TO UNANTICIPATED SELF DC    Short Term Goals: To be accomplished in 2 weeks:  1. Patient to be independent in HEP to maximize therapeutic effect of care plan. Evaluation: to be provided      2. Patient to report daily wear of compression stockings to facilitate long term management of lower extremity edema. Evaluation: just received tolerable garments, 1 day of wear at evaluation     Long Term Goals: To be accomplished in 4 weeks:  1. Patient to improve shanice ankle dorsiflexion to 10 deg or better to improve functional mobility mechanics and increase tolerance. Evaluation: right LE 7 deg, left 6 deg    2. Patient to demonstrate 10 single leg heel raises with UE support to indicate improved functional strength in stair negotiation and ambulation  Evaluation: right x3, left x5    3. Patient to improve FOTO score to 48 to indicate improved functional capacity and progress towards PLOF. Evaluation: 39      ASSESSMENT/RECOMMENDATIONS: Patient self DC stating she was having burning, sensory symptoms on backs of her legs and wanted to wait to continue treatment. []Discontinue therapy: []Patient has reached or is progressing toward set goals      [x]Patient is non-compliant or has abdicated      []Due to lack of appreciable progress towards set goals    Kaleigh Guallpa, PT 7/27/2022 5:02 PM

## 2022-09-06 ENCOUNTER — HOSPITAL ENCOUNTER (OUTPATIENT)
Dept: PHYSICAL THERAPY | Age: 67
Discharge: HOME | End: 2022-09-06
Payer: MEDICARE

## 2022-09-06 PROCEDURE — 97530 THERAPEUTIC ACTIVITIES: CPT

## 2022-09-06 PROCEDURE — 97162 PT EVAL MOD COMPLEX 30 MIN: CPT

## 2022-09-06 PROCEDURE — 97110 THERAPEUTIC EXERCISES: CPT

## 2022-09-06 NOTE — PROGRESS NOTES
[facility]  [address] [facility] ([place], [place]) [address]  [place], [address]  [phone] fax    Plan of Care/ Statement of Necessity for Physical Therapy Services    Patient name: Reyes Chery Start of Care: 2022   Referral source: Loni Ferraro MD : 1955    Medical Diagnosis: Right ankle pain [M25.571]  Ankle pain, left [M25.572]  Payor: [place] MEDICARE / Plan: [place] MEDICARE PART A & B / Product Type: Medicare /  Onset Date:    Treatment Diagnosis: B ankle pain   Prior Hospitalization: see medical history Provider#: [id number]   Medications: Verified on Patient summary List    Comorbidities: B ankle pain,    Prior Level of Function: functionally I with ADL      The Plan of Care and following information is based on the information from the initial evaluation. Assessment/ key information: 78 y/o female presents with c/o B ankle pain that started in 2019. The pt reports pain is in the medial ankle and achilles of B LE. Pain is reported to be worse with standing and walking. The pt reports having PT in the past with some relief but had to stop because of her pain. The pt presents with antalgic gait and is currently wearing bedroom slippers. + discoloration and moderate edema is noted of B lower legs. She reports she has also had lymphedema treatment in the past. She reports having compression stocking but has not been wearing them because she feels they are too hot. The pt demonstrates significantly limited B ankle AROM and gastroc flexibility, limited B ankle strength and + tenderness to palpation of B achilles. The pt will benefit from PT to address the aforementioned impairments.       Evaluation Complexity History MEDIUM  Complexity : 1-2 comorbidities / personal factors will impact the outcome/ POC ; Examination MEDIUM Complexity : 3 Standardized tests and measures addressing body structure, function, activity limitation and / or participation in recreation ;Presentation MEDIUM Complexity : Evolving with changing characteristics  ; Clinical Decision Making MEDIUM Complexity : FOTO score of 26-74  Overall Complexity Rating: MEDIUM  Problem List: pain affecting function, decrease ROM, decrease strength, edema affecting function, impaired gait/ balance, decrease ADL/ functional abilitiies, decrease activity tolerance, and decrease flexibility/ joint mobility   Treatment Plan may include any combination of the following: Therapeutic exercise, Therapeutic activities, Neuromuscular re-education, Physical agent/modality, Gait/balance training, Manual therapy, Aquatic therapy, Patient education, and Self Care training  Patient / Family readiness to learn indicated by: asking questions and trying to perform skills  Persons(s) to be included in education: patient (P)  Barriers to Learning/Limitations: None  Patient Goal (s): To help me with the pain  Patient Self Reported Health Status: fair  Rehabilitation Potential: fair    Short Term Goals: To be accomplished in 1 weeks:   1. The pt will be I and compliant with HEP     Long Term Goals: To be accomplished in 4 weeks:   1. The pt will report at least 50% improvement for improved ability for functional tasks   2. Improve B ankle AROM DF to neutral for improved ability for walking   3. The pt will demonstrate 4+/5 right ankle strength in all planes of motion for improved ability for daily activities   4. The pt will report moderate difficulty with her usual work / housework. Frequency / Duration: Patient to be seen 2 times per week for 4 weeks.     Patient/ Caregiver education and instruction: Diagnosis, prognosis, self care, activity modification, and exercises   [x]  Plan of care has been reviewed with PTA    Certification Period: 09-06-22 to 10-04-22  Brenda Franco PT 9/6/2022 9:03 AM    ________________________________________________________________________    I certify that the above Therapy Services are being furnished while the patient is under my care. I agree with the treatment plan and certify that this therapy is necessary.     [de-identified] Signature:____________________  Date:____________Time: _________     Eulalia Jimenez MD  Please sign and return to [facility] [address] [place] [address]  [address] [place] [address] [address]  [phone] fax

## 2022-09-06 NOTE — PROGRESS NOTES
PT DAILY TREATMENT NOTE     Patient Name: Ileana Domínguez  Date:2022  : 1955  [x]  Patient  Verified  Payor: VA MEDICARE / Plan: VA MEDICARE PART A & B / Product Type: Medicare /    In RIXA:[id number]  Out time:915  Total Treatment Time (min): 40  Visit #: 1 of 8    Medicare/BCBS Only   Total Timed Codes (min):  25 1:1 Treatment Time:  40       Treatment Area: Right ankle pain [M25.571]  Ankle pain, left [M25.572]    SUBJECTIVE  Pain Level (0-10 scale): 8  Any medication changes, allergies to medications, adverse drug reactions, diagnosis change, or new procedure performed?: [x] No    [] Yes (see summary sheet for update)  Subjective functional status/changes:   [] No changes reported       OBJECTIVE    15 min [x]Eval                  []Re-Eval       17 min Therapeutic Exercise:  [] See flow sheet : HEP   Rationale: increase ROM and increase strength to improve the patients ability to perform ADL    8 min Therapeutic Activity:  []  See flow sheet : Edema management, pain management, foot wear   Rationale: increase ROM and decrease edema   to improve the patients ability to perform ADL           With   [] TE   [] TA   [] neuro   [] other: Patient Education: [x] Review HEP    [] Progressed/Changed HEP based on:   [] positioning   [] body mechanics   [] transfers   [] heat/ice application    [] other:      Other Objective/Functional Measures:       Pain Level (0-10 scale) post treatment: 8    ASSESSMENT/Changes in Function:      Patient will continue to benefit from skilled PT services to modify and progress therapeutic interventions, address functional mobility deficits, address ROM deficits, address strength deficits, analyze and address soft tissue restrictions, analyze and cue movement patterns, and analyze and modify body mechanics/ergonomics to attain remaining goals.      [x]  See Plan of Care  []  See progress note/recertification  []  See Discharge Summary         Progress towards goals / Updated goals:  Short Term Goals: To be accomplished in 1 weeks:   1. The pt will be I and compliant with HEP   IE- issued HEP  Long Term Goals: To be accomplished in 4 weeks:   1. The pt will report at least 50% improvement for improved ability for functional tasks   IE- worsening   2. Improve B ankle AROM DF to neutral for improved ability for walking   IE- lacking 10 degrees from neutral   3. The pt will demonstrate 4+/5 right ankle strength in all planes of motion for improved ability for daily activities   IE- 4/5 to 4-/5   4. The pt will report moderate difficulty with her usual work / housework. IE- extreme difficulty     PLAN  []  Upgrade activities as tolerated       Continue plan of care[x]  []  Update interventions per flow sheet       []  Discharge due to:_  []  Other:_      Johnson Beaulieu, PT 9/6/2022  9:00 AM    No future appointments.

## 2022-09-08 ENCOUNTER — APPOINTMENT (OUTPATIENT)
Dept: PHYSICAL THERAPY | Age: 67
End: 2022-09-08
Payer: MEDICARE

## 2022-09-14 ENCOUNTER — HOSPITAL ENCOUNTER (OUTPATIENT)
Dept: PHYSICAL THERAPY | Age: 67
Discharge: HOME | End: 2022-09-14
Payer: MEDICARE

## 2022-09-14 PROCEDURE — 97035 APP MDLTY 1+ULTRASOUND EA 15: CPT

## 2022-09-14 PROCEDURE — 97535 SELF CARE MNGMENT TRAINING: CPT

## 2022-09-14 PROCEDURE — 97110 THERAPEUTIC EXERCISES: CPT

## 2022-09-14 PROCEDURE — 97140 MANUAL THERAPY 1/> REGIONS: CPT

## 2022-09-14 NOTE — PROGRESS NOTES
PT DAILY TREATMENT NOTE     Patient Name: Dick Kilgore  Date:2022  : 1955  [x]  Patient  Verified  Payor: [place] MED[place] / Plan: [place] MED[place]ARE PART A & B / Product Type: Medicare /    In time:1030  Out time:1125  Total Treatment Time (min): 55  Visit #: 2 of 8    Medicare/BCBS Only   Total Timed Codes (min):  45 1:1 Treatment Time:  45       Treatment Area: Right ankle pain [M25.571]  Ankle pain, left [M25.572]    SUBJECTIVE  Pain Level (0-10 scale): 7  Any medication changes, allergies to medications, adverse drug reactions, diagnosis change, or new procedure performed?: [x] No    [] Yes (see summary sheet for update)  Subjective functional status/changes:   [] No changes reported  Patient reports that she was unable work yesterday due to pain in both of her ankles, L>R. Today the pain is also located in her shins, L>R. She still has not been wearing her compression stockings due to the high temperatures. She states that she has not been completing her HEP because \"it hurts\". OBJECTIVE    Modality rationale: decrease edema, decrease inflammation, and decrease pain to improve the patients ability to tolerate ADLs.     Min Type Additional Details    [] Estim:  []Unatt       []IFC  []Premod                        []Other:  []w/ice   []w/heat  Position:  Location:    [] Estim: []Att    []TENS instruct  []NMES                    []Other:  []w/US   []w/ice   []w/heat  Position:  Location:    []  Traction: [] Cervical       []Lumbar                       [] Prone          []Supine                       []Intermittent   []Continuous Lbs:  [] before manual  [] after manual   8 [x]  Ultrasound: []Continuous   [x] Pulsed                           [x]1MHz   []3MHz W/cm2:  Location: [place]    []  Iontophoresis with dexamethasone         Location: [] Take home patch   [] In clinic   10 [x]  Ice     []  heat  []  Ice massage  []  Laser   []  Anodyne Position: long sitting   Location: B ankles    [] Laser with stim  []  Other:  Position:  Location:    []  Vasopneumatic Device    []  Right     []  Left  Pre-treatment girth:  Post-treatment girth:  Measured at (location):  Pressure:       [] lo [] med [] hi   Temperature: [] lo [] med [] hi   [x] Skin assessment post-treatment:  [x]intact [x]redness- no adverse reaction    []redness - adverse reaction:         14 min Therapeutic Exercise:  [x] See flow sheet :   Rationale: increase ROM and increase strength to improve the patients ability to complete AdLs with ease. 15 min Self Care/Home Management: patient education on pain management, working within pain-free ranges of motion, edema management   Rationale:  educate patient   to improve the patients ability to tolerate HEP and ADLs. 8 min Manual Therapy:  STM and gentle PROM into DF to B ankle/gastroc/plantar fascia   The manual therapy interventions were performed at a separate and distinct time from the therapeutic activities interventions. Rationale: decrease pain, increase ROM, increase tissue extensibility, and decrease trigger points to improve mobility/desensitize for ease of ADL completion. With   [x] TE   [] TA   [] neuro   [x] other: Patient Education: [x] Review HEP    [] Progressed/Changed HEP based on:   [] positioning   [] body mechanics   [] transfers   [] heat/ice application    [x] other: Increase compliance with compression stocking wear, work within pain-free ranges of motion     Other Objective/Functional Measures:   -severe TTP noted on left anterior shin     -fig 8 measurements: R: 55 cm , L: 60cm     Pain Level (0-10 scale) post treatment: 7 left, 8 right ankles    ASSESSMENT/Changes in Function: Patient presented to clinic with report of elevated pain levels and demonstrating hypersensitivity and reluctance to participate in therapeutic exercises. Severe TTP noted initially on left anterior shin.  After heavy education regarding edema management, patient was able to tolerate initiation of POC with remaining time but continued to report inconsistent, increased pain and initially reported that she felt \"better\" after ultrasound treatment but then reported elevated pain in her right ankle greater than her left. Cold pack was applied prior to exiting clinic for improved patient comfort. Figure eight measurements were taken; plan to continue to monitor in future visits. Patient will continue to benefit from skilled PT services to modify and progress therapeutic interventions, address functional mobility deficits, address ROM deficits, address strength deficits, analyze and address soft tissue restrictions, analyze and cue movement patterns, analyze and modify body mechanics/ergonomics, assess and modify postural abnormalities, address imbalance/dizziness, and instruct in home and community integration to attain remaining goals. []  See Plan of Care  []  See progress note/recertification  []  See Discharge Summary         Progress towards goals / Updated goals:  Short Term Goals: To be accomplished in 1 weeks:              1. The pt will be I and compliant with HEP              IE- issued HEP  Long Term Goals: To be accomplished in 4 weeks:              1. The pt will report at least 50% improvement for improved ability for functional tasks              IE- worsening              2. Improve B ankle AROM DF to neutral for improved ability for walking              IE- lacking 10 degrees from neutral              3. The pt will demonstrate 4+/5 right ankle strength in all planes of motion for improved ability for daily activities              IE- 4/5 to 4-/5              4. The pt will report moderate difficulty with her usual work / housework.               IE- extreme difficulty     PLAN  []  Upgrade activities as tolerated     []  Continue plan of care  []  Update interventions per flow sheet       []  Discharge due to:_  []  Other:_      Carlito Milian PTA 9/14/2022  8:07 AM    Future Appointments   Date Time Provider Sun Ortiz   9/14/2022 10:30 AM Kimberlyn Meneses, PTA [facility]   9/16/2022 11:15 AM Sariah Stewart, PT [facility]   9/21/2022 11:30 AM Mayford Goldberg, PTA [facility]   9/23/2022 10:30 AM Kimberlyn Meneses, PTA [facility]   9/28/2022 10:30 AM Sariah Stewart, PT [facility]   9/30/2022 10:30 AM Sariah Stewart, PT [facility]

## 2022-09-15 ENCOUNTER — OFFICE VISIT (OUTPATIENT)
Dept: ORTHOPEDIC SURGERY | Age: 67
End: 2022-09-15
Payer: MEDICARE

## 2022-09-15 VITALS — HEIGHT: 66 IN | BODY MASS INDEX: 33.59 KG/M2 | WEIGHT: 209 LBS | TEMPERATURE: 97.7 F

## 2022-09-15 DIAGNOSIS — S63.612A SPRAIN OF RIGHT MIDDLE FINGER, INITIAL ENCOUNTER: Primary | ICD-10-CM

## 2022-09-15 PROBLEM — N18.30 CHRONIC RENAL DISEASE, STAGE III (HCC): Status: ACTIVE | Noted: 2022-09-15

## 2022-09-15 PROBLEM — E11.9 TYPE 2 DIABETES MELLITUS (HCC): Status: ACTIVE | Noted: 2022-09-15

## 2022-09-15 PROCEDURE — G8427 DOCREV CUR MEDS BY ELIG CLIN: HCPCS | Performed by: ORTHOPAEDIC SURGERY

## 2022-09-15 PROCEDURE — 3017F COLORECTAL CA SCREEN DOC REV: CPT | Performed by: ORTHOPAEDIC SURGERY

## 2022-09-15 PROCEDURE — 1090F PRES/ABSN URINE INCON ASSESS: CPT | Performed by: ORTHOPAEDIC SURGERY

## 2022-09-15 PROCEDURE — 1123F ACP DISCUSS/DSCN MKR DOCD: CPT | Performed by: ORTHOPAEDIC SURGERY

## 2022-09-15 PROCEDURE — 99215 OFFICE O/P EST HI 40 MIN: CPT | Performed by: ORTHOPAEDIC SURGERY

## 2022-09-15 PROCEDURE — G8400 PT W/DXA NO RESULTS DOC: HCPCS | Performed by: ORTHOPAEDIC SURGERY

## 2022-09-15 PROCEDURE — G8432 DEP SCR NOT DOC, RNG: HCPCS | Performed by: ORTHOPAEDIC SURGERY

## 2022-09-15 PROCEDURE — 1101F PT FALLS ASSESS-DOCD LE1/YR: CPT | Performed by: ORTHOPAEDIC SURGERY

## 2022-09-15 PROCEDURE — G8536 NO DOC ELDER MAL SCRN: HCPCS | Performed by: ORTHOPAEDIC SURGERY

## 2022-09-15 PROCEDURE — G8756 NO BP MEASURE DOC: HCPCS | Performed by: ORTHOPAEDIC SURGERY

## 2022-09-15 PROCEDURE — G8417 CALC BMI ABV UP PARAM F/U: HCPCS | Performed by: ORTHOPAEDIC SURGERY

## 2022-09-15 PROCEDURE — G9899 SCRN MAM PERF RSLTS DOC: HCPCS | Performed by: ORTHOPAEDIC SURGERY

## 2022-09-15 NOTE — PROGRESS NOTES
gNuyen Leary is a 77 y.o. female right handed unspecified employment. Worker's Compensation and legal considerations: none filed. Vitals:    09/15/22 1302   Temp: 97.7 °F (36.5 °C)   TempSrc: Temporal   Weight: 209 lb (94.8 kg)   Height: 5' 6\" (1.676 m)   PainSc:   0 - No pain   PainLoc: Finger           Chief Complaint   Patient presents with    Finger Pain     Rt middle finger         HPI: Patient presents today with complaints of a right middle finger injury. She reports getting a twisted as well as swelling after. She reports it to be improving. Date of onset: Indeterminate    Injury: Yes: Comment: Twisting injury of right middle finger    Prior Treatment:  No    Numbness/ Tingling: No      ROS: Review of Systems - General ROS: negative  Psychological ROS: negative  ENT ROS: negative  Allergy and Immunology ROS: negative  Hematological and Lymphatic ROS: negative  Respiratory ROS: no cough, shortness of breath, or wheezing  Cardiovascular ROS: no chest pain or dyspnea on exertion  Gastrointestinal ROS: no abdominal pain, change in bowel habits, or black or bloody stools  Musculoskeletal ROS: negative  Neurological ROS: negative  Dermatological ROS: negative    Past Medical History:   Diagnosis Date    Essential hypertension, benign        Past Surgical History:   Procedure Laterality Date    HX APPENDECTOMY      HX BREAST LUMPECTOMY Right        Current Outpatient Medications   Medication Sig Dispense Refill    triamterene-hydroCHLOROthiazide (MAXZIDE) 37.5-25 mg per tablet Take 1 Tablet by mouth daily. 90 Tablet 1    metFORMIN ER (GLUCOPHAGE XR) 500 mg tablet Take 1 Tablet by mouth daily (with dinner). 30 Tablet 2    diclofenac EC (VOLTAREN) 50 mg EC tablet Take 1 Tablet by mouth two (2) times daily as needed for Pain (take with food). 60 Tablet 2    famotidine (PEPCID) 40 mg tablet Take 1 Tablet by mouth two (2) times daily as needed for Heartburn (take with diclofenac).  60 Tablet 2    triamcinolone (ARISTOCORT) 0.5 % topical cream Apply  to affected area two (2) times a day. use thin layer 30 g 0    diclofenac (VOLTAREN) 1 % gel Apply  to affected area four (4) times daily. 100 g 0       No Known Allergies        PE:     Physical Exam  Vitals and nursing note reviewed. Constitutional:       General: She is not in acute distress. Appearance: Normal appearance. She is not ill-appearing. Cardiovascular:      Pulses: Normal pulses. Pulmonary:      Effort: Pulmonary effort is normal. No respiratory distress. Musculoskeletal:         General: Tenderness present. No swelling, deformity or signs of injury. Normal range of motion. Cervical back: Normal range of motion and neck supple. Right lower leg: No edema. Left lower leg: No edema. Skin:     General: Skin is warm and dry. Capillary Refill: Capillary refill takes less than 2 seconds. Findings: No bruising or erythema. Neurological:      General: No focal deficit present. Mental Status: She is alert and oriented to person, place, and time. Psychiatric:         Mood and Affect: Mood normal.         Behavior: Behavior normal.          Right middle finger: There is a slight deviation of the of the DIP joint. There is no significant laxity noted. Neurovascularly intact distally. Range of motion full. Imaging:     External plain films of right hand is significant for degenerative changes especially at the DIP joint of the middle finger. ICD-10-CM ICD-9-CM    1. Sprain of right middle finger, initial encounter  S63.612A 842.10             Plan:     Discussed continued anti-inflammatories and range of motion exercises. 45 minutes was spent discussing past pertinent medical history as well as other medical problems. Additionally time was spent before and after visit discussing with patient and family. Follow-up and Dispositions    Return if symptoms worsen or fail to improve.           Plan was reviewed with patient, who verbalized agreement and understanding of the plan

## 2022-09-16 ENCOUNTER — HOSPITAL ENCOUNTER (OUTPATIENT)
Dept: PHYSICAL THERAPY | Age: 67
Discharge: HOME | End: 2022-09-16
Payer: MEDICARE

## 2022-09-16 PROCEDURE — 97110 THERAPEUTIC EXERCISES: CPT

## 2022-09-16 PROCEDURE — 97140 MANUAL THERAPY 1/> REGIONS: CPT

## 2022-09-16 PROCEDURE — 97112 NEUROMUSCULAR REEDUCATION: CPT

## 2022-09-16 NOTE — PROGRESS NOTES
PT DAILY TREATMENT NOTE 10-18    Patient Name: Homer Dupree  Date:2022  : 1955  [x]  Patient  Verified  Payor: [place] MEDICARE / Plan: [place] MEDICARE PART A & B / Product Type: Medicare /    In time:1114  Out time:1204  Total Treatment Time (min): 50  Visit #: 3 of 8    Medicare/BCBS Only   Total Timed Codes (min):  40 1:1 Treatment Time:  40       Treatment Area: Right ankle pain [M25.571]  Ankle pain, left [M25.572]    SUBJECTIVE  Pain Level (0-10 scale): 7  Any medication changes, allergies to medications, adverse drug reactions, diagnosis change, or new procedure performed?: [x] No    [] Yes (see summary sheet for update)  Subjective functional status/changes:   [] No changes reported  I had to take a pain pill before I came.      OBJECTIVE    Modality rationale: decrease inflammation and decrease pain to improve the patients ability to perform daily activities   Min Type Additional Details    [] Estim:  []Unatt       []IFC  []Premod                        []Other:  []w/ice   []w/heat  Position:  Location:    [] Estim: []Att    []TENS instruct  []NMES                    []Other:  []w/US   []w/ice   []w/heat  Position:  Location:    []  Traction: [] Cervical       []Lumbar                       [] Prone          []Supine                       []Intermittent   []Continuous Lbs:  [] before manual  [] after manual   [id number] [x]  Ultrasound: [x]Continuous   [] Pulsed         8'                  [x]1MHz   []3MHz Location:B [place]  W/cm2:1.0    []  Iontophoresis with dexamethasone         Location: [] Take home patch   [] In clinic   10 [x]  Ice     []  heat  []  Ice massage  []  Laser   []  Anodyne Position:long sitting  Location:[place]    []  Laser with stim  []  Other: Position:  Location:    []  Vasopneumatic Device  Pre-treatment girth:  Post-treatment girth:  Measured at (location):  Pressure:       [] lo [] med [] hi   Temperature: [] lo [] med [] hi   [] Skin assessment post-treatment: []intact []redness- no adverse reaction    []redness - adverse reaction:     14 min Therapeutic Exercise:  [x] See flow sheet :   Rationale: increase ROM and increase strength to improve the patients ability to perform daily activities      10 min Neuromuscular Re-education:  [x]  See flow sheet :   Rationale: increase strength, improve coordination, improve balance, and increase proprioception  to improve the patients ability to ambulate and stand for ADLs with less pain and more stability     8 min Manual Therapy:  retrograde massage; STM B gastroc/soleus    The manual therapy interventions were performed at a separate and distinct time from the therapeutic activities interventions. Rationale: decrease pain, increase ROM, increase tissue extensibility, and decrease edema  to improve mobility for daily activities   With   [] TE   [] TA   [] neuro   [] other: Patient Education: [x] Review HEP    [] Progressed/Changed HEP based on:   [] positioning   [] body mechanics   [] transfers   [] heat/ice application    [] other:      Other Objective/Functional Measures:      Pain Level (0-10 scale) post treatment: right 6; left 7    ASSESSMENT/Changes in Function: Poor tolerance to exercises. Significant edema and discoloration noted in B ankles. Semi-compliant with HEP. Patient will continue to benefit from skilled PT services to modify and progress therapeutic interventions, address functional mobility deficits, address ROM deficits, address strength deficits, analyze and address soft tissue restrictions, analyze and cue movement patterns, and address imbalance/dizziness to attain remaining goals. []  See Plan of Care  []  See progress note/recertification  []  See Discharge Summary         Progress towards goals / Updated goals:  Short Term Goals:  To be accomplished in 1 weeks:              1. The pt will be I and compliant with HEP              IE- issued HEP   Current: reports doing them when she can- progressing 9/16/22  Long Term Goals: To be accomplished in 4 weeks:              1. The pt will report at least 50% improvement for improved ability for functional tasks              IE- worsening              2. Improve B ankle AROM DF to neutral for improved ability for walking              IE- lacking 10 degrees from neutral              3. The pt will demonstrate 4+/5 right ankle strength in all planes of motion for improved ability for daily activities              IE- 4/5 to 4-/5              4. The pt will report moderate difficulty with her usual work / housework.               IE- extreme difficulty     PLAN  [x]  Upgrade activities as tolerated     []  Continue plan of care  []  Update interventions per flow sheet       []  Discharge due to:_  []  Other:_      Bronson Kwon, PARTH, CMTPT 9/16/2022  8:40 AM    Future Appointments   Date Time Provider Sun Ortiz   9/16/2022 11:15 AM Feldman Poplar, PT [facility]   9/21/2022 11:30 AM Ike Park, PTA [facility]   9/23/2022 10:30 AM Ana Quesada, PTA [facility]   9/28/2022 10:30 AM Feldman Poplar, PT [facility]   9/30/2022 10:30 AM Feldman Poplar, PT [facility]

## 2022-09-21 ENCOUNTER — TELEPHONE (OUTPATIENT)
Dept: PHYSICAL THERAPY | Age: 67
End: 2022-09-21

## 2022-09-23 ENCOUNTER — HOSPITAL ENCOUNTER (OUTPATIENT)
Dept: PHYSICAL THERAPY | Age: 67
Discharge: HOME | End: 2022-09-23
Payer: MEDICARE

## 2022-09-23 PROCEDURE — 97110 THERAPEUTIC EXERCISES: CPT

## 2022-09-23 PROCEDURE — 97140 MANUAL THERAPY 1/> REGIONS: CPT

## 2022-09-28 ENCOUNTER — HOSPITAL ENCOUNTER (OUTPATIENT)
Dept: PHYSICAL THERAPY | Age: 67
Discharge: HOME | End: 2022-09-28
Payer: MEDICARE

## 2022-09-28 PROCEDURE — 97112 NEUROMUSCULAR REEDUCATION: CPT

## 2022-09-28 PROCEDURE — 97110 THERAPEUTIC EXERCISES: CPT

## 2022-09-28 PROCEDURE — 97140 MANUAL THERAPY 1/> REGIONS: CPT

## 2022-09-28 NOTE — PROGRESS NOTES
PT DAILY TREATMENT NOTE 10-18    Patient Name: Adia Blank  Date:2022  : 1955  [x]  Patient  Verified  Payor: [place] MEDICARE / Plan: [place] MEDICARE PART A & B / Product Type: Medicare /    In time:1028  Out time:1129  Total Treatment Time (min): 61  Visit #: 5 of 8    Medicare/BCBS Only   Total Timed Codes (min):  51 1:1 Treatment Time:  51       Treatment Area: Right ankle pain [M25.571]  Ankle pain, left [M25.572]    SUBJECTIVE  Pain Level (0-10 scale): 8  Any medication changes, allergies to medications, adverse drug reactions, diagnosis change, or new procedure performed?: [x] No    [] Yes (see summary sheet for update)  Subjective functional status/changes:   [x] No changes reported      OBJECTIVE    Modality rationale: decrease pain to improve the patients ability to tolerate post exercise soreness   Min Type Additional Details   10 [x]  Ice     []  heat  []  Ice massage  []  Laser   []  Anodyne Position:long sitting  Location:B ankles   [] Skin assessment post-treatment:  []intact []redness- no adverse reaction    []redness - adverse reaction:         33 min Therapeutic Exercise:  [x] See flow sheet :   Rationale: increase ROM and increase strength to improve the patients ability to perform daily activities      10 min Neuromuscular Re-education:  [x]  See flow sheet :   Rationale: increase ROM and increase strength  to improve the patients ability to perform daily activities     8 min Manual Therapy:  retrograde massage B ankles   The manual therapy interventions were performed at a separate and distinct time from the therapeutic activities interventions.   Rationale: decrease pain, increase ROM, increase tissue extensibility, and decrease edema  to perform ADLs  With   [] TE   [] TA   [] neuro   [] other: Patient Education: [x] Review HEP    [] Progressed/Changed HEP based on:   [] positioning   [] body mechanics   [] transfers   [] heat/ice application    [] other:      Other Objective/Functional Measures:      Pain Level (0-10 scale) post treatment: right 7; left 7.5    ASSESSMENT/Changes in Function: No change in pain or symptoms; plan to D/C and refer back to MD due to lack of appreciable progress. Patient will continue to benefit from skilled PT services to modify and progress therapeutic interventions, address functional mobility deficits, address ROM deficits, address strength deficits, analyze and address soft tissue restrictions, and analyze and cue movement patterns to attain remaining goals. []  See Plan of Care  []  See progress note/recertification  []  See Discharge Summary         Progress towards goals / Updated goals:  Short Term Goals: To be accomplished in 1 weeks:              1. The pt will be I and compliant with HEP              IE- issued HEP              Current: reports doing them when she can- semi-compliant 9/28/22  Long Term Goals: To be accomplished in 4 weeks:              1. The pt will report at least 50% improvement for improved ability for functional tasks              IE- worsening  Current: no improvement              2. Improve B ankle AROM DF to neutral for improved ability for walking              IE- lacking 10 degrees from neutral  Current: Goal in progress- addressing with stretches and with manual DF stretch (9/23/22)              3. The pt will demonstrate 4+/5 right ankle strength in all planes of motion for improved ability for daily activities              IE- 4/5 to 4-/5  Current: Goal in progress- addressing with added T-band ankle therex (9/23/22)               4. The pt will report moderate difficulty with her usual work / housework.               IE- extreme difficulty   Current: extreme difficulty - no change    PLAN  [x]  Upgrade activities as tolerated     []  Continue plan of care  []  Update interventions per flow sheet       []  Discharge due to:_  []  Other:_      Alejandra Mayorga, PARTH, CMTPT 9/28/2022  8:34 AM    Future Appointments   Date Time Provider Sun Ortiz   9/28/2022 10:30 AM Alexa Linares, PT [facility]   9/30/2022 10:30 AM Alexa Linares PT [facility]

## 2022-10-24 ENCOUNTER — OFFICE VISIT (OUTPATIENT)
Dept: ORTHOPEDIC SURGERY | Age: 67
End: 2022-10-24
Payer: MEDICARE

## 2022-10-24 VITALS — TEMPERATURE: 97.5 F

## 2022-10-24 DIAGNOSIS — M25.572 BILATERAL ANKLE PAIN, UNSPECIFIED CHRONICITY: ICD-10-CM

## 2022-10-24 DIAGNOSIS — M25.571 BILATERAL ANKLE PAIN, UNSPECIFIED CHRONICITY: ICD-10-CM

## 2022-10-24 DIAGNOSIS — M76.822 POSTERIOR TIBIAL TENDINITIS OF BOTH LOWER EXTREMITIES: ICD-10-CM

## 2022-10-24 DIAGNOSIS — M76.62 ACHILLES TENDONITIS, BILATERAL: ICD-10-CM

## 2022-10-24 DIAGNOSIS — M76.821 POSTERIOR TIBIAL TENDINITIS OF BOTH LOWER EXTREMITIES: ICD-10-CM

## 2022-10-24 DIAGNOSIS — M62.838 MUSCLE SPASM OF BOTH LOWER LEGS: ICD-10-CM

## 2022-10-24 DIAGNOSIS — M76.61 ACHILLES TENDONITIS, BILATERAL: ICD-10-CM

## 2022-10-24 DIAGNOSIS — I89.0 LYMPHEDEMA OF BOTH LOWER EXTREMITIES: Primary | ICD-10-CM

## 2022-10-24 PROCEDURE — G8427 DOCREV CUR MEDS BY ELIG CLIN: HCPCS | Performed by: ORTHOPAEDIC SURGERY

## 2022-10-24 PROCEDURE — 1123F ACP DISCUSS/DSCN MKR DOCD: CPT | Performed by: ORTHOPAEDIC SURGERY

## 2022-10-24 PROCEDURE — G8400 PT W/DXA NO RESULTS DOC: HCPCS | Performed by: ORTHOPAEDIC SURGERY

## 2022-10-24 PROCEDURE — G8536 NO DOC ELDER MAL SCRN: HCPCS | Performed by: ORTHOPAEDIC SURGERY

## 2022-10-24 PROCEDURE — 3017F COLORECTAL CA SCREEN DOC REV: CPT | Performed by: ORTHOPAEDIC SURGERY

## 2022-10-24 PROCEDURE — 99214 OFFICE O/P EST MOD 30 MIN: CPT | Performed by: ORTHOPAEDIC SURGERY

## 2022-10-24 PROCEDURE — G8432 DEP SCR NOT DOC, RNG: HCPCS | Performed by: ORTHOPAEDIC SURGERY

## 2022-10-24 PROCEDURE — G8417 CALC BMI ABV UP PARAM F/U: HCPCS | Performed by: ORTHOPAEDIC SURGERY

## 2022-10-24 PROCEDURE — 1090F PRES/ABSN URINE INCON ASSESS: CPT | Performed by: ORTHOPAEDIC SURGERY

## 2022-10-24 PROCEDURE — G8756 NO BP MEASURE DOC: HCPCS | Performed by: ORTHOPAEDIC SURGERY

## 2022-10-24 PROCEDURE — 1101F PT FALLS ASSESS-DOCD LE1/YR: CPT | Performed by: ORTHOPAEDIC SURGERY

## 2022-10-24 PROCEDURE — 73610 X-RAY EXAM OF ANKLE: CPT | Performed by: ORTHOPAEDIC SURGERY

## 2022-10-24 PROCEDURE — G9899 SCRN MAM PERF RSLTS DOC: HCPCS | Performed by: ORTHOPAEDIC SURGERY

## 2022-10-24 NOTE — PROGRESS NOTES
AMBULATORY PROGRESS NOTE      Patient: Aneesh Tovar             MRN: [id number]     SSN: [id number] There is no height or weight on file to calculate BMI. YOB: 1955     AGE: 77 y.o. EX: female    PCP: Dwayne Sousa NP       IMPRESSION //  DIAGNOSIS AND TREATMENT PLAN      Aneesh Tovar has a diagnosis of:      DIAGNOSES    1. Lymphedema of both lower extremities    2. Bilateral ankle pain, unspecified chronicity    3. Achilles tendonitis, bilateral    4. Posterior tibial tendinitis of both lower extremities    5. Muscle spasm of both lower legs        Orders Placed This Encounter    [[id number]] Ankle Min 3V     Order Specific Question:   Weight bearing? Answer:   No    [[id number]] Ankle Min 3V     Order Specific Question:   Weight bearing? Answer:   No    REFERRAL TO VASCULAR SURGERY     Referral Priority:   Routine     Referral Type:   Consultation     Referral Reason:   Specialty Services Required     Number of Visits Requested:   1    DUPLEX LOWER EXT VENOUS BILAT     Standing Status:   Future     Standing Expiration Date:   11/24/2022    DUPLEX LOWER EXT ARTERY BILAT     Standing Status:   Future     Standing Expiration Date:   11/24/2022            PLAN:    1. I obtained bilateral ankle 3V XR in the office today. 2. I will order bilateral lower extremity venous PVL and arterial PVR studies to assess for vascular causes of her bilateral leg edema. 3. Referral to Dr. Ajay Ozuna for evaluation and treatment of bilateral leg edema. RTO after vascular studies    Patient Instructions   Runner's stretch with gentle push-off, 10 reps daily in the mornings    Floor stretch    Stand about 2 feet from a wall, and place your hands on the wall at about shoulder height. Or you can stand behind a chair, placing your hands on the back of it for balance. Step back with the leg you want to stretch.  Keep the leg straight, and press your heel into the floor with your toe turned slightly in. Lean forward, and bend your other leg slightly. Feel the stretch in the Achilles tendon of your back leg. Hold for at least 15 to 30 seconds. Repeat 2 to 4 times a session, up to 5 sessions a day. Please follow up with your PCP for any health maintenance as recommended         Miroslava Chong  expresses understanding of the diagnosis, treatment plan, and all of their proposed questions were answered to their satisfaction. Patient education has been provided re the diagnoses. HPI //  [address] [place] A 77 y.o. female who is a/an  established patient, presenting to my outpatient office for evaluation of  the following chief complaint(s):     Chief Complaint   Patient presents with    Ankle Pain     bilat       At [address] [place] [address] (10/19/2021), Miroslava Chong presented with right Achilles tendinosis. I advised her to do the runner's stretch regularly and document swelling in her left leg. Since Roberto Dean reports pain and tightness in the bilateral calves, pain in the bilateral ankles over the medial malleoli, and swelling in the bilateral legs with prolonged weightbearing. She admits she is unable to wear closed shoes due to tenderness and swelling in her feet. She states that she has had significant difficulty with prolonged standing. She further reports difficulty with raising her legs to hip level and states her leg pain and swelling are only alleviated with lying in bed. She reports she has completed physical therapy but admits she is unable to do some of the exercises. She also notes she had to use ice on her legs after physical therapy sessions in order to walk out of the office comfortably. She is taking an analgesic prescribed by her PCP. She denies diabetes mellitus. She denies being followed by any other physicians for her bilateral leg edema. She states she wears compression stockings.     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 Visit Vitals  Temp 97.5 °F (36.4 °C)       Appearance: Alert, well appearing and pleasant patient who is in no distress, oriented to person, place/time, and who follows commands. Normal dress/motor activity/thought processes/memory. This patient is accompanied in the examination room by her  self. Patient arrives to office via: without assistive device. Footwear: slippers. Psychiatric:  Normal Affect/mood. Judgement, behavior, and conduct are appropriate. Speech normal in context and clarity, memory intact grossly, no involuntary movements - tremors. H EENT (2): Head normocephalic & atraumatic. Eye: pupils are round// EOM are intact // Neck: ROM WNL  // Hearings Intact   Respiratory: Breathing non labored     ANKLE/FOOT bilateral     Gait: normal  Tenderness: Left: Exquisite over Achilles tendon and posterior tibial tendon. Right: Exquisite over the medial malleolus and posterior tibial tendon. Cutaneous: Venous stasis changes over the bilateral ankles and distal legs circumferentially. Joint Motion: Left: limited inversion. WNL   Joint / Tendon Stability:  No Ankle or Subtalar instability or joint laxity. No peroneal sublux ability or dislocation  Alignment: neutral Hindfoot,    Neuro Motor/Sensory: NL/NL  Vascular: NL foot/ankle pulses,   Lymphatics: No extremity lymphedema, No calf swelling, no tenderness to calf muscles. CHART REVIEW     Flaco Ingram has been experiencing pain and discomfort confirmed as outlined in the pain assessment outlined below.  was reviewed by Alex Tyler MD, on 10/24/2022.      Pain Assessment  10/24/2022   Location of Pain Ankle   Location Modifiers Left;Right   Severity of Pain 8   Quality of Pain Dull;Aching   Quality of Pain Comment -   Duration of Pain -   Duration of Pain Comment -   Frequency of Pain Constant   Date Pain First Started - Aggravating Factors Walking;Standing   Aggravating Factors Comment -   Limiting Behavior Yes   Relieving Factors Rest   Relieving Factors Comment -   Result of Injury -        Georgia Hernandes  has a past medical history of Essential hypertension, benign. Patients is employed at:          has a past medical history of Essential hypertension, benign. has a past surgical history that includes hx appendectomy and hx breast lumpectomy (Right). family history includes Hypertension in an other family member. Current Outpatient Medications   Medication Sig    triamterene-hydroCHLOROthiazide (MAXZIDE) 37.5-25 mg per tablet Take 1 Tablet by mouth daily. metFORMIN ER (GLUCOPHAGE XR) 500 mg tablet Take 1 Tablet by mouth daily (with dinner). diclofenac EC (VOLTAREN) 50 mg EC tablet Take 1 Tablet by mouth two (2) times daily as needed for Pain (take with food). famotidine (PEPCID) 40 mg tablet Take 1 Tablet by mouth two (2) times daily as needed for Heartburn (take with diclofenac). triamcinolone (ARISTOCORT) 0.5 % topical cream Apply  to affected area two (2) times a day. use thin layer    diclofenac (VOLTAREN) 1 % gel Apply  to affected area four (4) times daily. No current facility-administered medications for this visit. No Known Allergies  Social History     Occupational History    Not on file   Tobacco Use    Smoking status: Never    Smokeless tobacco: Never   Vaping Use    Vaping Use: Never used   Substance and Sexual Activity    Alcohol use: No    Drug use: Never    Sexual activity: Not on file       reports that she has never smoked. She has never used smokeless tobacco. She reports that she does not drink alcohol and does not use drugs.       DIAGNOSTIC LAB DATA      Lab Results   Component Value Date/Time    Hemoglobin A1c 7.3 (H) 02/09/2022 08:20 AM    Hemoglobin A1c 6.3 (H) 01/09/2020 10:53 AM    Hemoglobin A1c 6.5 (H) 07/16/2019 12:35 PM    //   Lab Results   Component Value Date/Time Glucose 122 (H) 02/09/2022 08:20 AM        Lab Results   Component Value Date/Time    Hemoglobin A1c (POC) 5.5 10/10/2019 11:21 PM         No results found for: VITD3, XQVID2, XQVID3, XQVID, VD3RIA, [id number]     Drug Screen Most Recent Result Date    No resulted procedures found. REVIEW OF SYSTEMS : 10/24/2022  ALL BELOW ARE Negative except : SEE HPI     All other systems reviewed and are negative. 12 point review of systems otherwise negative unless noted in HPI. RADIOGRAPHS// IMAGING//DIAGNOSTIC DATA     Orders Placed This Encounter    [[id number]] Ankle Min 3V    [[id number]] Ankle Min 3V    REFERRAL TO VASCULAR SURGERY        Right ankle nonweightbearing x-rays, NON WEIGHT BEARING, 3 views, AP/LAT/OBL completed 10/24/2022 AT [facility]    X-rays reveal Osseous:    no acute fracture//there are no dislocation or subluxation noted. Overall alignment is  acceptable. There are some spurring to the Achilles tendon and lateral x-ray soft tissue swelling is moderate soft tissue noted. No osteolytic or osteoblastic lesions noted. Mineralization suggests yes osteopenia. Degenerative changes are mild anterior ankle noted. Calcified vessels are not present. Left ankle nonweightbearing x-rays, NON WEIGHT BEARING, 3 views, AP/LAT/OBL completed 10/24/2022 AT [facility]    X-rays reveal Osseous:    no acute fracture//there are no dislocation or subluxation noted. Overall alignment is  acceptable. There are some spurring to the Achilles tendon and lateral x-ray soft tissue swelling is moderate soft tissue noted. No osteolytic or osteoblastic lesions noted. Mineralization suggests yes osteopenia. Degenerative changes are mild anterior ankle noted. Calcified vessels are not present. I have personally reviewed the images of the above study.  The interpretation of this study is my professional opinion           I have spent 30 minutes reviewing the previous notes, reviewing diagnostic studies [Advanced  Imaging, Diagnostic test results (x-rays)] and had a direct face to face with the patient discussing the diagnosis and importance of compliance with the treatment and plan. There is  discussion for the potential for surgery, answering all questions, as well as documenting patient care coordination for this individual on the day of the visit. Disclaimer:     Sections of this note are dictated using utilizing voice recognition software, which may have resulted in some phonetic based errors in grammar and contents. Even though attempts were made to correct all the mistakes, some may have been missed, and remained in the body of the document. If questions arise, please contact our department. An electronic signature was used to authenticate this note. Mari Emanuel may have a reminder for a \"due or due soon\" health maintenance. I have asked that she contact her primary care provider for follow-up on this health maintenance. Patient Instructions   Runner's stretch with gentle push-off, 10 reps daily in the mornings    Floor stretch    Stand about 2 feet from a wall, and place your hands on the wall at about shoulder height. Or you can stand behind a chair, placing your hands on the back of it for balance. Step back with the leg you want to stretch. Keep the leg straight, and press your heel into the floor with your toe turned slightly in. Lean forward, and bend your other leg slightly. Feel the stretch in the Achilles tendon of your back leg. Hold for at least 15 to 30 seconds. Repeat 2 to 4 times a session, up to 5 sessions a day. Please follow up with your PCP for any health maintenance as recommended.                 Scribed by Shanti Mcadams, as dictated by Mirella Pearson MD.   10/24/2022  7:26 AM

## 2022-10-24 NOTE — PATIENT INSTRUCTIONS
Runner's stretch with gentle push-off, 10 reps daily in the mornings    Floor stretch    Stand about 2 feet from a wall, and place your hands on the wall at about shoulder height. Or you can stand behind a chair, placing your hands on the back of it for balance. Step back with the leg you want to stretch. Keep the leg straight, and press your heel into the floor with your toe turned slightly in. Lean forward, and bend your other leg slightly. Feel the stretch in the Achilles tendon of your back leg. Hold for at least 15 to 30 seconds. Repeat 2 to 4 times a session, up to 5 sessions a day.

## 2022-11-02 ENCOUNTER — HOSPITAL ENCOUNTER (OUTPATIENT)
Dept: VASCULAR SURGERY | Age: 67
Discharge: HOME | End: 2022-11-02
Attending: ORTHOPAEDIC SURGERY
Payer: MEDICARE

## 2022-11-02 DIAGNOSIS — I89.0 LYMPHEDEMA OF BOTH LOWER EXTREMITIES: ICD-10-CM

## 2022-11-02 DIAGNOSIS — M25.572 BILATERAL ANKLE PAIN, UNSPECIFIED CHRONICITY: ICD-10-CM

## 2022-11-02 DIAGNOSIS — M25.571 BILATERAL ANKLE PAIN, UNSPECIFIED CHRONICITY: ICD-10-CM

## 2022-11-02 PROCEDURE — 93970 EXTREMITY STUDY: CPT

## 2022-11-08 ENCOUNTER — HOSPITAL ENCOUNTER (OUTPATIENT)
Dept: VASCULAR SURGERY | Age: 67
Discharge: HOME | End: 2022-11-08
Attending: ORTHOPAEDIC SURGERY
Payer: MEDICARE

## 2022-11-08 DIAGNOSIS — M25.572 BILATERAL ANKLE PAIN, UNSPECIFIED CHRONICITY: ICD-10-CM

## 2022-11-08 DIAGNOSIS — M25.571 BILATERAL ANKLE PAIN, UNSPECIFIED CHRONICITY: ICD-10-CM

## 2022-11-08 DIAGNOSIS — I89.0 LYMPHEDEMA OF BOTH LOWER EXTREMITIES: ICD-10-CM

## 2022-11-08 PROCEDURE — 93925 LOWER EXTREMITY STUDY: CPT

## 2022-11-08 PROCEDURE — 93923 UPR/LXTR ART STDY 3+ LVLS: CPT

## 2022-11-09 LAB
LEFT ABI: 1.03
LEFT ARM BP: 172 MMHG
LEFT CALF PRESSURE: 209 MMHG
LEFT POSTERIOR TIBIAL: 167 MMHG
LEFT TBI: 1.2
LEFT TOE PRESSURE: 206 MMHG
RIGHT ABI: 1.05
RIGHT ARM BP: 168 MMHG
RIGHT CALF PRESSURE: 194 MMHG
RIGHT POSTERIOR TIBIAL: 181 MMHG
RIGHT TBI: 1.21
RIGHT TOE PRESSURE: 208 MMHG
VAS LEFT DORSALIS PEDIS BP: 177 MMHG
VAS RIGHT DORSALIS PEDIS BP: 178 MMHG

## 2022-11-10 RX ORDER — TRIAMTERENE/HYDROCHLOROTHIAZID 37.5-25 MG
1 TABLET ORAL DAILY
Qty: 90 TABLET | Refills: 1 | Status: SHIPPED | OUTPATIENT
Start: 2022-11-10

## 2022-11-10 NOTE — TELEPHONE ENCOUNTER
Patient came in office requesting medication refill pt stated she took last pill yesterday     Last visit 02/08/22     Next in office visit 12/09/22

## 2022-11-22 ENCOUNTER — OFFICE VISIT (OUTPATIENT)
Dept: ORTHOPEDIC SURGERY | Age: 67
End: 2022-11-22
Payer: MEDICARE

## 2022-11-22 DIAGNOSIS — M25.571 CHRONIC PAIN OF BOTH ANKLES: Primary | ICD-10-CM

## 2022-11-22 DIAGNOSIS — G89.29 CHRONIC PAIN OF BOTH ANKLES: Primary | ICD-10-CM

## 2022-11-22 DIAGNOSIS — I89.0 LYMPHEDEMA OF BOTH LOWER EXTREMITIES: ICD-10-CM

## 2022-11-22 DIAGNOSIS — M79.605 BILATERAL LEG PAIN: ICD-10-CM

## 2022-11-22 DIAGNOSIS — M79.604 BILATERAL LEG PAIN: ICD-10-CM

## 2022-11-22 DIAGNOSIS — M25.572 CHRONIC PAIN OF BOTH ANKLES: Primary | ICD-10-CM

## 2022-11-22 PROCEDURE — G8536 NO DOC ELDER MAL SCRN: HCPCS | Performed by: ORTHOPAEDIC SURGERY

## 2022-11-22 PROCEDURE — G9899 SCRN MAM PERF RSLTS DOC: HCPCS | Performed by: ORTHOPAEDIC SURGERY

## 2022-11-22 PROCEDURE — 1101F PT FALLS ASSESS-DOCD LE1/YR: CPT | Performed by: ORTHOPAEDIC SURGERY

## 2022-11-22 PROCEDURE — 1123F ACP DISCUSS/DSCN MKR DOCD: CPT | Performed by: ORTHOPAEDIC SURGERY

## 2022-11-22 PROCEDURE — 1090F PRES/ABSN URINE INCON ASSESS: CPT | Performed by: ORTHOPAEDIC SURGERY

## 2022-11-22 PROCEDURE — 99213 OFFICE O/P EST LOW 20 MIN: CPT | Performed by: ORTHOPAEDIC SURGERY

## 2022-11-22 PROCEDURE — 3017F COLORECTAL CA SCREEN DOC REV: CPT | Performed by: ORTHOPAEDIC SURGERY

## 2022-11-22 PROCEDURE — G8432 DEP SCR NOT DOC, RNG: HCPCS | Performed by: ORTHOPAEDIC SURGERY

## 2022-11-22 PROCEDURE — G8427 DOCREV CUR MEDS BY ELIG CLIN: HCPCS | Performed by: ORTHOPAEDIC SURGERY

## 2022-11-22 PROCEDURE — G8756 NO BP MEASURE DOC: HCPCS | Performed by: ORTHOPAEDIC SURGERY

## 2022-11-22 PROCEDURE — G8417 CALC BMI ABV UP PARAM F/U: HCPCS | Performed by: ORTHOPAEDIC SURGERY

## 2022-11-22 PROCEDURE — G8400 PT W/DXA NO RESULTS DOC: HCPCS | Performed by: ORTHOPAEDIC SURGERY

## 2022-11-22 NOTE — PROGRESS NOTES
AMBULATORY PROGRESS NOTE      Patient: Clemencia Romero             MRN: [id number]     SSN: [id number] There is no height or weight on file to calculate BMI. YOB: 1955     AGE: 77 y.o. EX: female    PCP: Briant Galeazzi, NP       IMPRESSION //  DIAGNOSIS AND TREATMENT PLAN      Clemencia Romero has a diagnosis of:      She complains of bilateral tibia pain, along the proximal small anterior face medial face of each tibia no trauma no fever shakes chills night sweats. Does [place], all the time. She had x-rays of her left tibia, in April 2021, this was normal.    She had PVL venous and arterial studies, that were essentially normal, but she still has some swelling in her lower legs, as such is can see Cody, her vascular team to see if there is any component of early lymphedema. From orthopedic diagnostic standpoint, I recommend a triple phase technetium bone scan, to make sure that she does not have any other occult deleterious findings tibial diaphyseal regions possible in the regions bilaterally. DIAGNOSES    1. Chronic pain of both ankles    2. Bilateral leg pain    3. Lymphedema of both lower extremities        Orders Placed This Encounter    NM BONE SCAN 3 PHASE     Standing Status:   Future     Standing Expiration Date:   12/22/2022     Order Specific Question:   Specific Body Part     Answer:   bilateral lower extremities            PLAN:    1. I will order a triple-phase bone scan to assess for periostitis. RTO after bone scan    There are no Patient Instructions on file for this visit. Please follow up with your PCP for any health maintenance as recommended         Clemencia Romero  expresses understanding of the diagnosis, treatment plan, and all of their proposed questions were answered to their satisfaction. Patient education has been provided re the diagnoses.          HPI //  [address] A 77 y.o. female who is a/an  established patient, presenting to my outpatient office for evaluation of  the following chief complaint(s):     Chief Complaint   Patient presents with    Leg Pain     left       At [facility], Laci Guerra presented with bilateral Achilles tendinitis, bilateral posterior tibial tendinitis, and bilateral lymphedema. I obtained bilateral ankle 3V XR in the office. I ordered  bilateral lower extremity venous PVL and arterial PVR studies to assess for vascular causes of her bilateral leg edema. I referred her to Dr. Esthela Washington for evaluation and treatment of bilateral leg edema. Since Yasmin Siu reports severe pain in the left anterior leg and over the bilateral lateral malleoli. She rates her pain at an 8-10/10. She reports her symptoms begin after prolonged sitting at work. She reports difficulty transitioning from sit to stand. She suspects her pain and leg swelling are related to her long work hours. 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 She reports her hours have been cut. She states she quit one of her two jobs but is interested in obtaining a second job again. There were no vitals taken for this visit. Appearance: Alert, well appearing and pleasant patient who is in no distress, oriented to person, place/time, and who follows commands. Normal dress/motor activity/thought processes/memory. This patient is accompanied in the examination room by her  self. Patient arrives to office via: with assistive device: compression stockings. Footwear: athletic shoes    Psychiatric:  Normal Affect/mood. Judgement, behavior, and conduct are appropriate. Speech normal in context and clarity, memory intact grossly, no involuntary movements - tremors. H EENT (2): Head normocephalic & atraumatic.   Eye: pupils are round// EOM are intact // Neck: ROM WNL  // Hearings Intact   Respiratory: Breathing non labored     ANKLE/FOOT bilateral     Gait: uses assistive device - compression stockings  Tenderness:  exquisite  over the left anterior leg  Cutaneous: 1+ pretibial edema bilaterally. Joint Motion: Ankle and hindfoot motion WNL   Joint / Tendon Stability:  No Ankle or Subtalar instability or joint laxity. No peroneal sublux ability or dislocation  Alignment: neutral Hindfoot,    Neuro Motor/Sensory: NL/NL  Vascular: NL foot/ankle pulses,   Lymphatics: No extremity lymphedema, No calf swelling, no tenderness to calf muscles. CHART REVIEW     Miguel Garcia has been experiencing pain and discomfort confirmed as outlined in the pain assessment outlined below.  was reviewed by Fred Bolanos MD, on 11/22/2022. Pain Assessment  11/22/2022   Location of Pain -   Location Modifiers Left   Severity of Pain 9   Quality of Pain Aching   Quality of Pain Comment -   Duration of Pain -   Duration of Pain Comment -   Frequency of Pain Intermittent   Date Pain First Started -   Aggravating Factors Walking;Standing   Aggravating Factors Comment -   Limiting Behavior No   Relieving Factors -   Relieving Factors Comment -   Result of Injury -        Miguel Garcia  has a past medical history of Essential hypertension, benign. Patients is employed at:          has a past medical history of Essential hypertension, benign. has a past surgical history that includes hx appendectomy and hx breast lumpectomy (Right). family history includes Hypertension in an other family member. Current Outpatient Medications   Medication Sig    triamterene-hydroCHLOROthiazide (MAXZIDE) 37.5-25 mg per tablet Take 1 Tablet by mouth daily. metFORMIN ER (GLUCOPHAGE XR) 500 mg tablet Take 1 Tablet by mouth daily (with dinner). diclofenac EC (VOLTAREN) 50 mg EC tablet Take 1 Tablet by mouth two (2) times daily as needed for Pain (take with food).     famotidine (PEPCID) 40 mg tablet Take 1 Tablet by mouth two (2) times daily as needed for Heartburn (take with diclofenac). triamcinolone (ARISTOCORT) 0.5 % topical cream Apply  to affected area two (2) times a day. use thin layer    diclofenac (VOLTAREN) 1 % gel Apply  to affected area four (4) times daily. No current facility-administered medications for this visit. No Known Allergies  Social History     Occupational History    Not on file   Tobacco Use    Smoking status: Never    Smokeless tobacco: Never   Vaping Use    Vaping Use: Never used   Substance and Sexual Activity    Alcohol use: No    Drug use: Never    Sexual activity: Not on file       reports that she has never smoked. She has never used smokeless tobacco. She reports that she does not drink alcohol and does not use drugs. DIAGNOSTIC LAB DATA      Lab Results   Component Value Date/Time    Hemoglobin A1c 7.3 (H) 02/09/2022 08:20 AM    Hemoglobin A1c 6.3 (H) 01/09/2020 10:53 AM    Hemoglobin A1c 6.5 (H) 07/16/2019 12:35 PM    //   Lab Results   Component Value Date/Time    Glucose 122 (H) 02/09/2022 08:20 AM        Lab Results   Component Value Date/Time    Hemoglobin A1c (POC) 5.5 10/10/2019 11:21 PM         No results found for: VITD3, XQVID2, XQVID3, XQVID, VD3RIA, [id number]     Drug Screen Most Recent Result Date    No resulted procedures found. REVIEW OF SYSTEMS : 11/22/2022  ALL BELOW ARE Negative except : SEE HPI     All other systems reviewed and are negative. 12 point review of systems otherwise negative unless noted in HPI. RADIOGRAPHS// IMAGING//DIAGNOSTIC DATA     Orders Placed This Encounter    NM BONE SCAN 3 PHASE        LOWER EXT ART PVR MULT LEVEL SEG PRESSURES 11/08/2022  Interpretation Summary    Bilateral lower extremity ankle brachial index was within normal limits at rest.  Bilateral digital brachial index was within normal limits. As per patient blood pressure was elevated due to getting frustrated while awaiting registration for appt.       DUPLEX LOWER EXT VENOUS BILAT 11/02/2022   Interpretation Summary    No evidence of acute deep vein thrombosis in the right common femoral, femoral, popliteal, posterior tibial, and peroneal veins. The veins were imaged in the transverse and longitudinal planes. The vessels showed normal color filling and compressibility. Doppler interrogation showed phasic and spontaneous flow. No evidence of acute deep vein thrombosis in the left common femoral, femoral, popliteal, posterior tibial, and peroneal veins. The veins were imaged in the transverse and longitudinal planes. The vessels showed normal color filling and compressibility. Doppler interrogation showed phasic and spontaneous flow. I have personally reviewed the images of the above study. The interpretation of this study is my professional opinion           I have spent 30 minutes reviewing the previous notes, reviewing diagnostic studies [Advanced  Imaging, Diagnostic test results (x-rays)] and had a direct face to face with the patient discussing the diagnosis and importance of compliance with the treatment and plan. There is  discussion for the potential for surgery, answering all questions, as well as documenting patient care coordination for this individual on the day of the visit. Disclaimer:     Sections of this note are dictated using utilizing voice recognition software, which may have resulted in some phonetic based errors in grammar and contents. Even though attempts were made to correct all the mistakes, some may have been missed, and remained in the body of the document. If questions arise, please contact our department. An electronic signature was used to authenticate this note. Leigha Li may have a reminder for a \"due or due soon\" health maintenance. I have asked that she contact her primary care provider for follow-up on this health maintenance. There are no Patient Instructions on file for this visit.         Please follow up with your PCP for any health maintenance as recommended.                 Scribed by Abhishek Chapman, as dictated by Jalil Myers MD.   11/22/2022  9:50 AM

## 2022-11-28 ENCOUNTER — HOSPITAL ENCOUNTER (OUTPATIENT)
Dept: NUCLEAR MEDICINE | Age: 67
Discharge: HOME | End: 2022-11-28
Attending: ORTHOPAEDIC SURGERY
Payer: MEDICARE

## 2022-11-28 DIAGNOSIS — M79.605 BILATERAL LEG PAIN: ICD-10-CM

## 2022-11-28 DIAGNOSIS — M79.604 BILATERAL LEG PAIN: ICD-10-CM

## 2022-11-28 DIAGNOSIS — G89.29 CHRONIC PAIN OF BOTH ANKLES: ICD-10-CM

## 2022-11-28 DIAGNOSIS — M25.572 CHRONIC PAIN OF BOTH ANKLES: ICD-10-CM

## 2022-11-28 DIAGNOSIS — M25.571 CHRONIC PAIN OF BOTH ANKLES: ICD-10-CM

## 2022-11-28 PROCEDURE — 78315 BONE IMAGING 3 PHASE: CPT

## 2022-12-08 ENCOUNTER — TELEPHONE (OUTPATIENT)
Dept: FAMILY MEDICINE CLINIC | Age: 67
End: 2022-12-08

## 2022-12-08 NOTE — TELEPHONE ENCOUNTER
Patient came in stated she will not be able to attend her appointment and will like to reschedule but patient just walked out without rescheduling.

## 2022-12-13 ENCOUNTER — OFFICE VISIT (OUTPATIENT)
Dept: ORTHOPEDIC SURGERY | Age: 67
End: 2022-12-13
Payer: MEDICARE

## 2022-12-13 VITALS — WEIGHT: 210 LBS | BODY MASS INDEX: 33.75 KG/M2 | HEIGHT: 66 IN | TEMPERATURE: 97.1 F

## 2022-12-13 DIAGNOSIS — G89.29 CHRONIC PAIN OF BOTH ANKLES: ICD-10-CM

## 2022-12-13 DIAGNOSIS — M62.838 MUSCLE SPASM OF RIGHT LOWER EXTREMITY: ICD-10-CM

## 2022-12-13 DIAGNOSIS — M19.072 PRIMARY OSTEOARTHRITIS OF BOTH FEET: Primary | ICD-10-CM

## 2022-12-13 DIAGNOSIS — M19.071 PRIMARY OSTEOARTHRITIS OF BOTH FEET: Primary | ICD-10-CM

## 2022-12-13 DIAGNOSIS — M25.572 CHRONIC PAIN OF BOTH ANKLES: ICD-10-CM

## 2022-12-13 DIAGNOSIS — M76.62 LEFT ACHILLES TENDINITIS: ICD-10-CM

## 2022-12-13 DIAGNOSIS — M25.571 CHRONIC PAIN OF BOTH ANKLES: ICD-10-CM

## 2022-12-13 RX ORDER — CELECOXIB 200 MG/1
200 CAPSULE ORAL
Qty: 30 CAPSULE | Refills: 1 | Status: CANCELLED | OUTPATIENT
Start: 2022-12-13 | End: 2023-03-13

## 2022-12-13 NOTE — PROGRESS NOTES
AMBULATORY PROGRESS NOTE      Patient: Jany Tesfaye             MRN: [id number]     SSN: [id number] Body mass index is 33.89 kg/m². YOB: 1955     AGE: 77 y.o. EX: female    PCP: Cheryl Steinberg NP       IMPRESSION //  DIAGNOSIS AND TREATMENT PLAN      Jany Tesfaye has a diagnosis of:      DIAGNOSES    1. Primary osteoarthritis of both feet    2. Chronic pain of both ankles    3. Muscle spasm of right lower extremity    4. Left Achilles tendinitis        No orders of the defined types were placed in this encounter. PLAN:    1. Activity as tolerated    RTO 5 and arterial    There are no Patient Instructions on file for this visit. Please follow up with your PCP for any health maintenance as recommended. Jany Tesfaye  expresses understanding of the diagnosis, treatment plan, and all of their proposed questions were answered to their satisfaction. Patient education has been provided re the diagnoses. HPI //  [address] A 77 y.o. female who is a/an  established patient, presenting to my outpatient office for evaluation of  the following chief complaint(s):     No chief complaint on file. At [address] [place] [address], Jany Tesfaye presented with bilateral ankle pain and bilateral lower extremity lymphedema. I ordered a triple-phase nuclear bone scan to assess for periostitis. Since Brooke Peralta continues to report severe bilateral foot, ankle, and distal leg pain, L>R, exacerbated with weightbearing. She describes pain in the anterior and posterior distal legs and states she needs to use her arms to lift her legs onto a chair to elevate them. She describes significant left calf and anterior leg pain while at work and right foot spasms while driving. Physical therapy has not been helpful because the maneuvers cause so much pain. She further reports that her legs swell painfully.  She states compression socks do not help much. She notes dizziness with ibuprofen. She does not take any anticoagulants. She takes Maxzide but notes dizziness with a higher dose of the medication. 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 She notes that her job is sedentary until about six hours inter her shift. She reports her hours have been cut. Visit Vitals  Temp 97.1 °F (36.2 °C)   Ht 5' 6\" (1.676 m)   Wt 210 lb (95.3 kg)   BMI 33.89 kg/m²       Appearance: Alert, well appearing and pleasant patient who is in no distress, oriented to person, place/time, and who follows commands. Normal dress/motor activity/thought processes/memory. This patient is accompanied in the examination room by her  self. Patient arrives to office via: without assistive device. Footwear: boots with slight block heel. Psychiatric:  Normal Affect/mood. Judgement, behavior, and conduct are appropriate. Speech normal in context and clarity, memory intact grossly, no involuntary movements - tremors. H EENT (2): Head normocephalic & atraumatic. Eye: pupils are round// EOM are intact // Neck: ROM WNL  // Hearings Intact   Respiratory: Breathing non labored     ANKLE/FOOT bilateral     Gait: normal  Tenderness: Left: moderate along the anterior tibial tendon   Cutaneous: Venous stasis changes over the ankles. Joint Motion: WNL   Joint / Tendon Stability:  No Ankle or Subtalar instability or joint laxity. No peroneal sublux ability or dislocation  Alignment: neutral Hindfoot,    Neuro Motor/Sensory: NL/NL  Vascular: NL foot/ankle pulses,   Lymphatics: No extremity lymphedema, No calf swelling, no tenderness to calf muscles. CHART REVIEW     Adia Blank has been experiencing pain and discomfort confirmed as outlined in the pain assessment outlined below.  was reviewed by Jose F Mcclure MD. on 12/13/2022.      Pain Assessment  11/22/2022 Location of Pain -   Location Modifiers Left   Severity of Pain 9   Quality of Pain Aching   Quality of Pain Comment -   Duration of Pain -   Duration of Pain Comment -   Frequency of Pain Intermittent   Date Pain First Started -   Aggravating Factors Walking;Standing   Aggravating Factors Comment -   Limiting Behavior No   Relieving Factors -   Relieving Factors Comment -   Result of Injury -        Beth Carbajal  has a past medical history of Essential hypertension, benign. Patients is employed at:          has a past medical history of Essential hypertension, benign. has a past surgical history that includes hx appendectomy and hx breast lumpectomy (Right). family history includes Hypertension in an other family member. Current Outpatient Medications   Medication Sig    triamterene-hydroCHLOROthiazide (MAXZIDE) 37.5-25 mg per tablet Take 1 Tablet by mouth daily. metFORMIN ER (GLUCOPHAGE XR) 500 mg tablet Take 1 Tablet by mouth daily (with dinner). diclofenac EC (VOLTAREN) 50 mg EC tablet Take 1 Tablet by mouth two (2) times daily as needed for Pain (take with food). famotidine (PEPCID) 40 mg tablet Take 1 Tablet by mouth two (2) times daily as needed for Heartburn (take with diclofenac). triamcinolone (ARISTOCORT) 0.5 % topical cream Apply  to affected area two (2) times a day. use thin layer    diclofenac (VOLTAREN) 1 % gel Apply  to affected area four (4) times daily. No current facility-administered medications for this visit. Allergies   Allergen Reactions    Ibuprofen Vertigo     Social History     Occupational History    Not on file   Tobacco Use    Smoking status: Never    Smokeless tobacco: Never   Vaping Use    Vaping Use: Never used   Substance and Sexual Activity    Alcohol use: No    Drug use: Never    Sexual activity: Not on file       reports that she has never smoked.  She has never used smokeless tobacco. She reports that she does not drink alcohol and does not use drugs.      DIAGNOSTIC LAB DATA      Lab Results   Component Value Date/Time    Hemoglobin A1c 7.3 (H) 02/09/2022 08:20 AM    Hemoglobin A1c 6.3 (H) 01/09/2020 10:53 AM    Hemoglobin A1c 6.5 (H) 07/16/2019 12:35 PM    //   Lab Results   Component Value Date/Time    Glucose 122 (H) 02/09/2022 08:20 AM        Lab Results   Component Value Date/Time    Hemoglobin A1c (POC) 5.5 10/10/2019 11:21 PM         No results found for: VITD3, XQVID2, XQVID3, XQVID, VD3RIA, [id number]     Drug Screen Most Recent Result Date    No resulted procedures found. REVIEW OF SYSTEMS : 12/13/2022  ALL BELOW ARE Negative except : SEE HPI     All other systems reviewed and are negative. 12 point review of systems otherwise negative unless noted in HPI. RADIOGRAPHS// IMAGING//DIAGNOSTIC DATA     No orders of the defined types were placed in this encounter. NM BONE SCAN 3 PHASE 11/28/2022   IMPRESSION  1. No focal radiotracer uptake detected in the visualized mid to distal tibial  diaphyses to suggest stress fracture or stress reaction in these locations. Note  that the proximal aspect of the tibial diaphyses were not imaged on the present  exam. If there is is concern for stress fracture or stress reaction in these  locations consider additional imaging of this region either with MRI or bone  scan. 2.  Multifocal radiotracer uptake within the right hindfoot, midfoot, and  forefoot and left midfoot, likely arthritic (degenerative or inflammatory) in  nature. 3.  Radiotracer uptake at the bilateral Achilles tendon insertions, likely  related to tendon degeneration and/or tearing. Addendum: The uptake within the bilateral feet most likely reflect arthritis  although stress reaction/fracture is not excluded by bone scan. Recommend  correlation with radiographs. I have personally reviewed the images of the above study.  The interpretation of this study is my professional opinion           I have spent 20 minutes reviewing the previous notes, reviewing diagnostic studies [Advanced  Imaging, Diagnostic test results (x-rays)] and had a direct face to face with the patient discussing the diagnosis and importance of compliance with the treatment and plan. The treatment plan is listed in the above plan section of this Office encounter. I  answered all of her questions, as well as documenting patient care coordination for this individual on the day of the visit. Disclaimer:     Sections of this note are dictated using utilizing voice recognition software, which may have resulted in some phonetic based errors in grammar and contents. Even though attempts were made to correct all the mistakes, some may have been missed, and remained in the body of the document. If questions arise, please contact our department. An electronic signature was used to authenticate this note. Christopher Lyles may have a reminder for a \"due or due soon\" health maintenance. I have asked that she contact her primary care provider for follow-up on this health maintenance.            Scribed by Nathaly Driscoll, as dictated by Lazara Barrera MD.   12/13/2022  1:39 PM

## 2022-12-14 ENCOUNTER — TELEPHONE (OUTPATIENT)
Dept: FAMILY MEDICINE CLINIC | Age: 67
End: 2022-12-14

## 2023-01-03 ENCOUNTER — TELEPHONE (OUTPATIENT)
Dept: ORTHOPEDIC SURGERY | Age: 68
End: 2023-01-03

## 2023-01-03 NOTE — TELEPHONE ENCOUNTER
Patient came in saying Dr. Maryann Aburto was suppose to prescribe pain bills at her last appointment on 12/13. Went through her notes did not see anything about it so that is why we are sending this message.

## 2023-01-17 ENCOUNTER — OFFICE VISIT (OUTPATIENT)
Dept: ORTHOPEDIC SURGERY | Age: 68
End: 2023-01-17
Payer: MEDICARE

## 2023-01-17 VITALS — HEIGHT: 66 IN | WEIGHT: 207.8 LBS | BODY MASS INDEX: 33.4 KG/M2

## 2023-01-17 DIAGNOSIS — M25.571 BILATERAL ANKLE PAIN, UNSPECIFIED CHRONICITY: ICD-10-CM

## 2023-01-17 DIAGNOSIS — M19.072 PRIMARY OSTEOARTHRITIS OF BOTH FEET: ICD-10-CM

## 2023-01-17 DIAGNOSIS — M79.672 BILATERAL FOOT PAIN: ICD-10-CM

## 2023-01-17 DIAGNOSIS — M19.071 PRIMARY OSTEOARTHRITIS OF BOTH FEET: ICD-10-CM

## 2023-01-17 DIAGNOSIS — M25.572 BILATERAL ANKLE PAIN, UNSPECIFIED CHRONICITY: ICD-10-CM

## 2023-01-17 DIAGNOSIS — M76.62 LEFT ACHILLES TENDINITIS: Primary | ICD-10-CM

## 2023-01-17 DIAGNOSIS — M79.671 BILATERAL FOOT PAIN: ICD-10-CM

## 2023-01-17 PROCEDURE — 1101F PT FALLS ASSESS-DOCD LE1/YR: CPT | Performed by: ORTHOPAEDIC SURGERY

## 2023-01-17 PROCEDURE — 1090F PRES/ABSN URINE INCON ASSESS: CPT | Performed by: ORTHOPAEDIC SURGERY

## 2023-01-17 PROCEDURE — G8432 DEP SCR NOT DOC, RNG: HCPCS | Performed by: ORTHOPAEDIC SURGERY

## 2023-01-17 PROCEDURE — 3017F COLORECTAL CA SCREEN DOC REV: CPT | Performed by: ORTHOPAEDIC SURGERY

## 2023-01-17 PROCEDURE — G8427 DOCREV CUR MEDS BY ELIG CLIN: HCPCS | Performed by: ORTHOPAEDIC SURGERY

## 2023-01-17 PROCEDURE — G9899 SCRN MAM PERF RSLTS DOC: HCPCS | Performed by: ORTHOPAEDIC SURGERY

## 2023-01-17 PROCEDURE — G8400 PT W/DXA NO RESULTS DOC: HCPCS | Performed by: ORTHOPAEDIC SURGERY

## 2023-01-17 PROCEDURE — 1123F ACP DISCUSS/DSCN MKR DOCD: CPT | Performed by: ORTHOPAEDIC SURGERY

## 2023-01-17 PROCEDURE — 99213 OFFICE O/P EST LOW 20 MIN: CPT | Performed by: ORTHOPAEDIC SURGERY

## 2023-01-17 PROCEDURE — G8536 NO DOC ELDER MAL SCRN: HCPCS | Performed by: ORTHOPAEDIC SURGERY

## 2023-01-17 PROCEDURE — G8417 CALC BMI ABV UP PARAM F/U: HCPCS | Performed by: ORTHOPAEDIC SURGERY

## 2023-01-17 NOTE — PROGRESS NOTES
AMBULATORY PROGRESS NOTE      Patient: Otoniel Stevens             MRN: [id number]     SSN: [id number] Body mass index is 33.54 kg/m². YOB: 1955     AGE: 79 y.o. EX: female    PCP: Itz Valadez NP       IMPRESSION //  DIAGNOSIS AND TREATMENT PLAN      Otoniel Stevens has a diagnosis of:      DIAGNOSES    1. Left Achilles tendinitis    2. Bilateral foot pain    3. Bilateral ankle pain, unspecified chronicity    4. Primary osteoarthritis of both feet        No orders of the defined types were placed in this encounter. PLAN:    1. I advise the patient to do some Calf Stretches at home  2. Rx'd compounded cream    RTO 8 weeks    There are no Patient Instructions on file for this visit. Please follow up with your PCP for any health maintenance as recommended. Otoniel Stevens  expresses understanding of the diagnosis, treatment plan, and all of their proposed questions were answered to their satisfaction. Patient education has been provided re the diagnoses. HPI //  [address] A 79 y.o. female who is a/an  established patient, presenting to my outpatient office for evaluation of  the following chief complaint(s):     No chief complaint on file. At [address] [place] [address], Otoniel Stevens presented with primary osteoarthritis of both feet, chronic pain of both ankles, muscle spasm of right lower extremity and left Achilles tendinitis. I advise the patient to continue activities as tolerated. Since Archie Padron presents today with primary osteoarthritis of both feet, L>R. She reports that she has no changes in her pain. She also reports that she never got her prescription as the pharmacy keeps informing her that her medications are not there. She reports that she is dependent on her daughter most of the time to help with putting on her shoes. 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      Visit Vitals  Ht 5' 6\" (1.676 m)   Wt 207 lb 12.8 oz (94.3 kg)   BMI 33.54 kg/m²       Appearance: Alert, well appearing and pleasant patient who is in no distress, oriented to person, place/time, and who follows commands. Normal dress/motor activity/thought processes/memory. This patient is accompanied in the examination room by her  self. Patient arrives to office via: without assistive device. Footwear: UGGS    Psychiatric:  Normal Affect/mood. Judgement, behavior, and conduct are appropriate. Speech normal in context and clarity, memory intact grossly, no involuntary movements - tremors. H EENT (2): Head normocephalic & atraumatic. Eye: pupils are round// EOM are intact // Neck: ROM WNL  // Hearings Intact   Respiratory: Breathing non labored     ANKLE/FOOT bilateral     Gait: normal  Tenderness: left: mild tenderness in the medial ankle. Right:  Cutaneous: left: trace edema. Right:   Joint Motion:   WNL   Joint / Tendon Stability:  No Ankle or Subtalar instability or joint laxity. No peroneal sublux ability or dislocation  Alignment: neutral Hindfoot,    Neuro Motor/Sensory: NL/NL  Vascular: NL foot/ankle pulses,   Lymphatics: No extremity lymphedema, No calf swelling, no tenderness to calf muscles. CHART REVIEW     Mirtha Rivera has been experiencing pain and discomfort confirmed as outlined in the pain assessment outlined below.  was reviewed by Rupal Hunt MD  on 1/17/2023.      Pain Assessment  11/22/2022   Location of Pain -   Location Modifiers Left   Severity of Pain 9   Quality of Pain Aching   Quality of Pain Comment -   Duration of Pain -   Duration of Pain Comment -   Frequency of Pain Intermittent   Date Pain First Started -   Aggravating Factors Walking;Standing   Aggravating Factors Comment -   Limiting Behavior No   Relieving Factors -   Relieving Factors Comment -   Result of Injury -        Mirtha Rivera  has a past medical history of Essential hypertension, benign. Patients is employed at:          has a past medical history of Essential hypertension, benign. has a past surgical history that includes hx appendectomy and hx breast lumpectomy (Right). family history includes Hypertension in an other family member. Current Outpatient Medications   Medication Sig    triamterene-hydroCHLOROthiazide (MAXZIDE) 37.5-25 mg per tablet Take 1 Tablet by mouth daily. metFORMIN ER (GLUCOPHAGE XR) 500 mg tablet Take 1 Tablet by mouth daily (with dinner). diclofenac EC (VOLTAREN) 50 mg EC tablet Take 1 Tablet by mouth two (2) times daily as needed for Pain (take with food). famotidine (PEPCID) 40 mg tablet Take 1 Tablet by mouth two (2) times daily as needed for Heartburn (take with diclofenac). triamcinolone (ARISTOCORT) 0.5 % topical cream Apply  to affected area two (2) times a day. use thin layer    diclofenac (VOLTAREN) 1 % gel Apply  to affected area four (4) times daily. No current facility-administered medications for this visit. Allergies   Allergen Reactions    Ibuprofen Vertigo     Social History     Occupational History    Not on file   Tobacco Use    Smoking status: Never    Smokeless tobacco: Never   Vaping Use    Vaping Use: Never used   Substance and Sexual Activity    Alcohol use: No    Drug use: Never    Sexual activity: Not on file       reports that she has never smoked. She has never used smokeless tobacco. She reports that she does not drink alcohol and does not use drugs.       DIAGNOSTIC LAB DATA      Lab Results   Component Value Date/Time    Hemoglobin A1c 7.3 (H) 02/09/2022 08:20 AM    Hemoglobin A1c 6.3 (H) 01/09/2020 10:53 AM    Hemoglobin A1c 6.5 (H) 07/16/2019 12:35 PM    //   Lab Results   Component Value Date/Time    Glucose 122 (H) 02/09/2022 08:20 AM        Lab Results   Component Value Date/Time    Hemoglobin A1c (POC) 5.5 10/10/2019 11:21 PM         No results found for: Patricia Ann, Chuck Rojo, Amado Barry, RAJESH, [id number]     Drug Screen Most Recent Result Date    No resulted procedures found. REVIEW OF SYSTEMS : 1/17/2023  ALL BELOW ARE Negative except : SEE HPI     All other systems reviewed and are negative. 12 point review of systems otherwise negative unless noted in HPI. RADIOGRAPHS// IMAGING//DIAGNOSTIC DATA     No orders of the defined types were placed in this encounter. I have personally reviewed the images of the above study. The interpretation of this study is my professional opinion           I have spent 30 minutes reviewing the previous notes, reviewing diagnostic studies [Advanced  Imaging, Diagnostic test results (x-rays)] and had a direct face to face with the patient discussing the diagnosis and importance of compliance with the treatment and plan. The treatment plan is listed in the above plan section of this Office encounter. I  answered all of her questions, as well as documenting patient care coordination for this individual on the day of the visit. Disclaimer:     Sections of this note are dictated using utilizing voice recognition software, which may have resulted in some phonetic based errors in grammar and contents. Even though attempts were made to correct all the mistakes, some may have been missed, and remained in the body of the document. If questions arise, please contact our department. An electronic signature was used to authenticate this note. William Tsyon may have a reminder for a \"due or due soon\" health maintenance. I have asked that she contact her primary care provider for follow-up on this health maintenance.            Scribed by Jose Tjeeda; as dictated by Alissa Bueno MD   1/17/2023  10:24 AM Private car

## 2023-01-24 ENCOUNTER — TELEPHONE (OUTPATIENT)
Dept: ORTHOPEDIC SURGERY | Age: 68
End: 2023-01-24

## 2023-01-24 NOTE — TELEPHONE ENCOUNTER
Patient called and is requesting an order for Physical Therapy  at [facility]. She  saw Dr. Bushra Vela on 01/17/2023. She is hoping that PT will help her with her legs. She was crying during the phone call. She has been loosing her balance. She has pain from her right leg up to her thigh. She is wearing support stockings. Patient requests a call back at [phone]  Her next appt with Dr Bushra Vela is 03/20/2023.

## 2023-01-24 NOTE — TELEPHONE ENCOUNTER
Tried calling patient at two different phone numbers. Unable to leave VM. If patient calls back, please inform her that she should continue PT. Follow up as scheduled.

## 2023-02-14 DIAGNOSIS — M67.88 ACHILLES TENDONOSIS OF RIGHT LOWER EXTREMITY: Primary | ICD-10-CM

## 2023-02-15 ENCOUNTER — HOSPITAL ENCOUNTER (EMERGENCY)
Facility: HOSPITAL | Age: 68
Discharge: HOME | End: 2023-02-15
Attending: EMERGENCY MEDICINE
Payer: MEDICARE

## 2023-02-15 ENCOUNTER — APPOINTMENT (OUTPATIENT)
Facility: HOSPITAL | Age: 68
End: 2023-02-15
Payer: MEDICARE

## 2023-02-15 VITALS
HEIGHT: 66 IN | TEMPERATURE: 98.1 F | DIASTOLIC BLOOD PRESSURE: 71 MMHG | HEART RATE: 69 BPM | RESPIRATION RATE: 18 BRPM | SYSTOLIC BLOOD PRESSURE: 131 MMHG | BODY MASS INDEX: 33.75 KG/M2 | WEIGHT: 210 LBS | OXYGEN SATURATION: 97 %

## 2023-02-15 DIAGNOSIS — R07.9 CHEST PAIN, UNSPECIFIED TYPE: Primary | ICD-10-CM

## 2023-02-15 LAB
ALBUMIN SERPL-MCNC: 3.2 G/DL (ref 3.4–5)
ALBUMIN/GLOB SERPL: 0.7 (ref 0.8–1.7)
ALP SERPL-CCNC: 101 U/L (ref 45–117)
ALT SERPL-CCNC: 30 U/L (ref 13–56)
ANION GAP SERPL CALC-SCNC: 5 MMOL/L (ref 3–18)
AST SERPL-CCNC: 22 U/L (ref 10–38)
BASOPHILS # BLD: 0.1 K/UL (ref 0–0.1)
BASOPHILS NFR BLD: 1 % (ref 0–2)
BILIRUB SERPL-MCNC: 0.5 MG/DL (ref 0.2–1)
BUN SERPL-MCNC: 13 MG/DL (ref 7–18)
BUN/CREAT SERPL: 15 (ref 12–20)
CALCIUM SERPL-MCNC: 9.1 MG/DL (ref 8.5–10.1)
CHLORIDE SERPL-SCNC: 105 MMOL/L (ref 100–111)
CO2 SERPL-SCNC: 28 MMOL/L (ref 21–32)
CREAT SERPL-MCNC: 0.89 MG/DL (ref 0.6–1.3)
DIFFERENTIAL METHOD BLD: ABNORMAL
EKG ATRIAL RATE: 96 BPM
EKG DIAGNOSIS: NORMAL
EKG P AXIS: 44 DEGREES
EKG P-R INTERVAL: 148 MS
EKG Q-T INTERVAL: 388 MS
EKG QRS DURATION: 92 MS
EKG QTC CALCULATION (BAZETT): 490 MS
EKG R AXIS: -18 DEGREES
EKG T AXIS: 59 DEGREES
EKG VENTRICULAR RATE: 96 BPM
EOSINOPHIL # BLD: 0.3 K/UL (ref 0–0.4)
EOSINOPHIL NFR BLD: 3 % (ref 0–5)
ERYTHROCYTE [DISTWIDTH] IN BLOOD BY AUTOMATED COUNT: 14 % (ref 11.6–14.5)
GLOBULIN SER CALC-MCNC: 4.9 G/DL (ref 2–4)
GLUCOSE SERPL-MCNC: 131 MG/DL (ref 74–99)
HCT VFR BLD AUTO: 40.7 % (ref 35–45)
HGB BLD-MCNC: 12.8 G/DL (ref 12–16)
IMM GRANULOCYTES # BLD AUTO: 0.1 K/UL (ref 0–0.04)
IMM GRANULOCYTES NFR BLD AUTO: 1 % (ref 0–0.5)
LIPASE SERPL-CCNC: 76 U/L (ref 73–393)
LYMPHOCYTES # BLD: 3.2 K/UL (ref 0.9–3.6)
LYMPHOCYTES NFR BLD: 25 % (ref 21–52)
MAGNESIUM SERPL-MCNC: 2.1 MG/DL (ref 1.6–2.6)
MCH RBC QN AUTO: 25.9 PG (ref 24–34)
MCHC RBC AUTO-ENTMCNC: 31.4 G/DL (ref 31–37)
MCV RBC AUTO: 82.4 FL (ref 78–100)
MONOCYTES # BLD: 0.6 K/UL (ref 0.05–1.2)
MONOCYTES NFR BLD: 4 % (ref 3–10)
NEUTS SEG # BLD: 8.3 K/UL (ref 1.8–8)
NEUTS SEG NFR BLD: 66 % (ref 40–73)
NRBC # BLD: 0 K/UL (ref 0–0.01)
NRBC BLD-RTO: 0 PER 100 WBC
PLATELET # BLD AUTO: 351 K/UL (ref 135–420)
PMV BLD AUTO: 11 FL (ref 9.2–11.8)
POTASSIUM SERPL-SCNC: 3.8 MMOL/L (ref 3.5–5.5)
PROT SERPL-MCNC: 8.1 G/DL (ref 6.4–8.2)
RBC # BLD AUTO: 4.94 M/UL (ref 4.2–5.3)
SARS-COV-2 RDRP RESP QL NAA+PROBE: NOT DETECTED
SODIUM SERPL-SCNC: 138 MMOL/L (ref 136–145)
SOURCE: NORMAL
TROPONIN I SERPL HS-MCNC: 20 NG/L (ref 0–54)
TROPONIN I SERPL HS-MCNC: 21 NG/L (ref 0–54)
WBC # BLD AUTO: 12.5 K/UL (ref 4.6–13.2)

## 2023-02-15 PROCEDURE — 80053 COMPREHEN METABOLIC PANEL: CPT

## 2023-02-15 PROCEDURE — 83735 ASSAY OF MAGNESIUM: CPT

## 2023-02-15 PROCEDURE — 93005 ELECTROCARDIOGRAM TRACING: CPT | Performed by: EMERGENCY MEDICINE

## 2023-02-15 PROCEDURE — 6370000000 HC RX 637 (ALT 250 FOR IP): Performed by: EMERGENCY MEDICINE

## 2023-02-15 PROCEDURE — 87635 SARS-COV-2 COVID-19 AMP PRB: CPT

## 2023-02-15 PROCEDURE — 83690 ASSAY OF LIPASE: CPT

## 2023-02-15 PROCEDURE — 84484 ASSAY OF TROPONIN QUANT: CPT

## 2023-02-15 PROCEDURE — 99285 EMERGENCY DEPT VISIT HI MDM: CPT

## 2023-02-15 PROCEDURE — 71046 X-RAY EXAM CHEST 2 VIEWS: CPT

## 2023-02-15 PROCEDURE — 94760 N-INVAS EAR/PLS OXIMETRY 1: CPT

## 2023-02-15 PROCEDURE — 85025 COMPLETE CBC W/AUTO DIFF WBC: CPT

## 2023-02-15 RX ORDER — ASPIRIN 81 MG/1
81 TABLET ORAL
Status: COMPLETED | OUTPATIENT
Start: 2023-02-15 | End: 2023-02-15

## 2023-02-15 RX ADMIN — ASPIRIN 81 MG: 81 TABLET, COATED ORAL at 07:57

## 2023-02-15 ASSESSMENT — PAIN - FUNCTIONAL ASSESSMENT: PAIN_FUNCTIONAL_ASSESSMENT: NONE - DENIES PAIN

## 2023-02-15 NOTE — ED TRIAGE NOTES
\"Sharp\" Left sided chest pain, sudden onset while driving and lasted a few minutes. Denies any other sx's at this time.

## 2023-02-15 NOTE — ED PROVIDER NOTES
[address] [facility] EMERGENCY DEPT   2/15/23 7:44 AM EST   Emergency Provider: Alexa Calvo MD, MD    Yuridia Amor, 79 y.o. female     [id number]  1955    Location: [address]     Chief Complaint: Chest Pain       HPI: The patient presents to the emergency department complaining of Chest pain. The patient states she began to have chest pain at 7 AM this morning. She was sitting at a stoplight on her way to work. She describes it as sharp, episodic, and currently absent. She has no known history of corneal artery disease. She had a stress test a few months ago that was \"normal\". This was done at [facility].  She had no recommended follow-up. She has a history of peripheral edema and takes a diuretic of some type. She cannot remember the name of her medicine. She has no known history of dyslipidemia, hypertension, or diabetes. She denies tobacco use. She has no prior history of thromboembolic disease. She denies any risk factors for thromboembolic disease. She is employed at a group home. There is no exertional worsening of pain, syncope, or diaphoresis. She describes it as a pain  in her left breast.    Review of Systems: 14 organ system review of systems is complete and is negative except as addressed in the HPI. PCP: MARIBEL Loza NP    Social History     Socioeconomic History    Marital status:      Spouse name: None    Number of children: None    Years of education: None    Highest education level: None   Tobacco Use    Smoking status: Never    Smokeless tobacco: Never   Substance and Sexual Activity    Alcohol use: No    Drug use: Never        Family History   Problem Relation Age of Onset    Hypertension Other         Past Medical History:   Diagnosis Date    Essential hypertension, benign         Past Surgical History:  No date: APPENDECTOMY  No date: BREAST LUMPECTOMY; Right       There is no immunization history on file for this patient.     No Known Allergies    No current facility-administered medications for this encounter. No current outpatient medications on file. Physical Exam:   Vitals:    02/15/23 0720   BP: (!) 141/71   Pulse: 96   Resp: 20   Temp: 98.1 °F (36.7 °C)   SpO2: 97%     General: Well developed, well nourished, alert, appears stated age  [de-identified]: Normocephalic, atraumatic. No scleral icterus. Extraocular movements intact. Normal mucous membranes. Uvula midline. Airway widely patent. Respiratory: No accessory muscle use. No wheeze, No rales, No rhonchi. Normal chest wall excursion. No subcutaneous air, no rib crepitus  Cardiovascular: Regular rhythm and rate, Normal pulses, Normal perfusion. No edema. Gastrointestinal: Non distended, No masses. No ascites. No organomegaly. No evidence of trauma, nontender  Musculoskeletal: Full range of motion at all other tested joints. No joint effusions. Neurological: Normal strength, Normal sensation. Normal speech. No ataxia. Cranial nerves II-XII normal as tested. Skin: No rash, petechia or purpura. Warm and dry  Psychiatric: No suicidal ideation, No homicidal ideation. No hallucinations. Organized thoughts. Normal mood. normal affect. Heme: No lymphadenopathy. : Deferred    EKG: Normal sinus rhythm, rate of 96, QRS duration 92 ms,  ms, no STEMI. EKG was degraded by motion artifact. Imaging:   XR CHEST (2 VW)   Final Result   Borderline cardiac silhouette enlargement. No radiographic evidence of acute cardiopulmonary process.          Labs:   Labs Reviewed   CBC WITH AUTO DIFFERENTIAL - Abnormal; Notable for the following components:       Result Value    Immature Granulocytes 1 (*)     Segs Absolute 8.3 (*)     Absolute Immature Granulocyte 0.1 (*)     All other components within normal limits   COMPREHENSIVE METABOLIC PANEL - Abnormal; Notable for the following components:    Glucose 131 (*)     Albumin 3.2 (*)     Globulin 4.9 (*)     Albumin/Globulin Ratio 0.7 (*) All other components within normal limits   COVID-19, RAPID   TROPONIN   TROPONIN   LIPASE   MAGNESIUM   TROPONIN     ______________________________________________________________________  Medical Decision Makin:42 PM  The patient was observed in the emergency department. Relevant imaging studies, vital signs, and observation notes were reviewed. The patient's initial and repeat laboratory tests, including an initial and follow-up troponin were reviewed. There is no clinically significant change during the period of observation in the emergency department. I had a lengthy discussion with the patient regarding the risks and benefits of of admission to the hospital, versus further treatment observation the emergency department, or discharge home. Ultimately the patient decided that they wanted to go home, and felt safe for discharge. I counseled the patient that if they change her mind about wanting any further testing or treatment in the emergency department they can return at any time. They are low risk for outpatient management        DIAGNOSIS:   1. Chest pain, unspecified type        PRESCRIPTIONS:   New Prescriptions    No medications on file       DISPOSITION Decision To Discharge 02/15/2023 12:42:04 PM     FOLLOW-UP: MARIBEL Richter NP    This chart was completed using  an electronic medical record and dictation software.   It may contain unedited transcription errors           Joy Calvo MD  02/15/23 1561

## 2023-02-16 ENCOUNTER — TELEPHONE (OUTPATIENT)
Age: 68
End: 2023-02-16

## 2023-03-03 ENCOUNTER — OFFICE VISIT (OUTPATIENT)
Age: 68
End: 2023-03-03
Payer: MEDICARE

## 2023-03-03 ENCOUNTER — HOSPITAL ENCOUNTER (OUTPATIENT)
Facility: HOSPITAL | Age: 68
Setting detail: SPECIMEN
End: 2023-03-03
Payer: MEDICARE

## 2023-03-03 VITALS
HEART RATE: 72 BPM | DIASTOLIC BLOOD PRESSURE: 86 MMHG | SYSTOLIC BLOOD PRESSURE: 163 MMHG | OXYGEN SATURATION: 100 % | HEIGHT: 66 IN | BODY MASS INDEX: 32.43 KG/M2 | TEMPERATURE: 98 F | WEIGHT: 201.8 LBS | RESPIRATION RATE: 18 BRPM

## 2023-03-03 DIAGNOSIS — M54.42 LEFT-SIDED LOW BACK PAIN WITH LEFT-SIDED SCIATICA, UNSPECIFIED CHRONICITY: ICD-10-CM

## 2023-03-03 DIAGNOSIS — R07.89 INTERMITTENT LEFT-SIDED CHEST PAIN: ICD-10-CM

## 2023-03-03 DIAGNOSIS — I10 ESSENTIAL (PRIMARY) HYPERTENSION: ICD-10-CM

## 2023-03-03 DIAGNOSIS — E11.9 TYPE 2 DIABETES MELLITUS WITHOUT COMPLICATION, WITHOUT LONG-TERM CURRENT USE OF INSULIN (HCC): ICD-10-CM

## 2023-03-03 DIAGNOSIS — E11.9 TYPE 2 DIABETES MELLITUS WITHOUT COMPLICATION, WITHOUT LONG-TERM CURRENT USE OF INSULIN (HCC): Primary | ICD-10-CM

## 2023-03-03 PROBLEM — N18.30 CHRONIC RENAL DISEASE, STAGE III (HCC): Status: RESOLVED | Noted: 2022-09-15 | Resolved: 2023-03-03

## 2023-03-03 LAB
ALBUMIN SERPL-MCNC: 3.6 G/DL (ref 3.4–5)
ALBUMIN/GLOB SERPL: 0.7 (ref 0.8–1.7)
ALP SERPL-CCNC: 94 U/L (ref 45–117)
ALT SERPL-CCNC: 40 U/L (ref 13–56)
ANION GAP SERPL CALC-SCNC: 5 MMOL/L (ref 3–18)
AST SERPL-CCNC: 26 U/L (ref 10–38)
BILIRUB SERPL-MCNC: 0.4 MG/DL (ref 0.2–1)
BUN SERPL-MCNC: 19 MG/DL (ref 7–18)
BUN/CREAT SERPL: 22 (ref 12–20)
CALCIUM SERPL-MCNC: 10.2 MG/DL (ref 8.5–10.1)
CHLORIDE SERPL-SCNC: 105 MMOL/L (ref 100–111)
CHOLEST SERPL-MCNC: 202 MG/DL
CO2 SERPL-SCNC: 28 MMOL/L (ref 21–32)
CREAT SERPL-MCNC: 0.87 MG/DL (ref 0.6–1.3)
CREAT UR-MCNC: 85 MG/DL (ref 30–125)
EST. AVERAGE GLUCOSE BLD GHB EST-MCNC: 157 MG/DL
GLOBULIN SER CALC-MCNC: 5.1 G/DL (ref 2–4)
GLUCOSE SERPL-MCNC: 116 MG/DL (ref 74–99)
HBA1C MFR BLD: 7.1 % (ref 4.2–5.6)
HDLC SERPL-MCNC: 50 MG/DL (ref 40–60)
HDLC SERPL: 4 (ref 0–5)
LDLC SERPL CALC-MCNC: 128.2 MG/DL (ref 0–100)
LIPID PANEL: ABNORMAL
MICROALBUMIN UR-MCNC: 5.95 MG/DL (ref 0–3)
MICROALBUMIN/CREAT UR-RTO: 70 MG/G (ref 0–30)
POTASSIUM SERPL-SCNC: 4 MMOL/L (ref 3.5–5.5)
PROT SERPL-MCNC: 8.7 G/DL (ref 6.4–8.2)
SODIUM SERPL-SCNC: 138 MMOL/L (ref 136–145)
TRIGL SERPL-MCNC: 119 MG/DL
VLDLC SERPL CALC-MCNC: 23.8 MG/DL

## 2023-03-03 PROCEDURE — 80061 LIPID PANEL: CPT

## 2023-03-03 PROCEDURE — G8417 CALC BMI ABV UP PARAM F/U: HCPCS | Performed by: NURSE PRACTITIONER

## 2023-03-03 PROCEDURE — 3074F SYST BP LT 130 MM HG: CPT | Performed by: NURSE PRACTITIONER

## 2023-03-03 PROCEDURE — G8400 PT W/DXA NO RESULTS DOC: HCPCS | Performed by: NURSE PRACTITIONER

## 2023-03-03 PROCEDURE — 3017F COLORECTAL CA SCREEN DOC REV: CPT | Performed by: NURSE PRACTITIONER

## 2023-03-03 PROCEDURE — G8484 FLU IMMUNIZE NO ADMIN: HCPCS | Performed by: NURSE PRACTITIONER

## 2023-03-03 PROCEDURE — 80053 COMPREHEN METABOLIC PANEL: CPT

## 2023-03-03 PROCEDURE — 3078F DIAST BP <80 MM HG: CPT | Performed by: NURSE PRACTITIONER

## 2023-03-03 PROCEDURE — 1036F TOBACCO NON-USER: CPT | Performed by: NURSE PRACTITIONER

## 2023-03-03 PROCEDURE — 99214 OFFICE O/P EST MOD 30 MIN: CPT | Performed by: NURSE PRACTITIONER

## 2023-03-03 PROCEDURE — 83036 HEMOGLOBIN GLYCOSYLATED A1C: CPT

## 2023-03-03 PROCEDURE — G8427 DOCREV CUR MEDS BY ELIG CLIN: HCPCS | Performed by: NURSE PRACTITIONER

## 2023-03-03 PROCEDURE — 1124F ACP DISCUSS-NO DSCNMKR DOCD: CPT | Performed by: NURSE PRACTITIONER

## 2023-03-03 PROCEDURE — 2022F DILAT RTA XM EVC RTNOPTHY: CPT | Performed by: NURSE PRACTITIONER

## 2023-03-03 PROCEDURE — 1090F PRES/ABSN URINE INCON ASSESS: CPT | Performed by: NURSE PRACTITIONER

## 2023-03-03 PROCEDURE — 82043 UR ALBUMIN QUANTITATIVE: CPT

## 2023-03-03 PROCEDURE — 36415 COLL VENOUS BLD VENIPUNCTURE: CPT

## 2023-03-03 PROCEDURE — 3051F HG A1C>EQUAL 7.0%<8.0%: CPT | Performed by: NURSE PRACTITIONER

## 2023-03-03 RX ORDER — TRIAMTERENE AND HYDROCHLOROTHIAZIDE 75; 50 MG/1; MG/1
1 TABLET ORAL DAILY
Qty: 90 TABLET | Refills: 1 | Status: SHIPPED | OUTPATIENT
Start: 2023-03-03 | End: 2023-03-03 | Stop reason: SINTOL

## 2023-03-03 RX ORDER — LOSARTAN POTASSIUM 50 MG/1
50 TABLET ORAL DAILY
Qty: 30 TABLET | Refills: 5 | Status: SHIPPED | OUTPATIENT
Start: 2023-03-03

## 2023-03-03 SDOH — ECONOMIC STABILITY: FOOD INSECURITY: WITHIN THE PAST 12 MONTHS, THE FOOD YOU BOUGHT JUST DIDN'T LAST AND YOU DIDN'T HAVE MONEY TO GET MORE.: NEVER TRUE

## 2023-03-03 SDOH — ECONOMIC STABILITY: HOUSING INSECURITY
IN THE LAST 12 MONTHS, WAS THERE A TIME WHEN YOU DID NOT HAVE A STEADY PLACE TO SLEEP OR SLEPT IN A SHELTER (INCLUDING NOW)?: YES

## 2023-03-03 SDOH — ECONOMIC STABILITY: FOOD INSECURITY: WITHIN THE PAST 12 MONTHS, YOU WORRIED THAT YOUR FOOD WOULD RUN OUT BEFORE YOU GOT MONEY TO BUY MORE.: NEVER TRUE

## 2023-03-03 SDOH — ECONOMIC STABILITY: INCOME INSECURITY: HOW HARD IS IT FOR YOU TO PAY FOR THE VERY BASICS LIKE FOOD, HOUSING, MEDICAL CARE, AND HEATING?: SOMEWHAT HARD

## 2023-03-03 ASSESSMENT — PATIENT HEALTH QUESTIONNAIRE - PHQ9
2. FEELING DOWN, DEPRESSED OR HOPELESS: 0
SUM OF ALL RESPONSES TO PHQ9 QUESTIONS 1 & 2: 0
SUM OF ALL RESPONSES TO PHQ QUESTIONS 1-9: 0
SUM OF ALL RESPONSES TO PHQ QUESTIONS 1-9: 0
1. LITTLE INTEREST OR PLEASURE IN DOING THINGS: 0
SUM OF ALL RESPONSES TO PHQ QUESTIONS 1-9: 0
SUM OF ALL RESPONSES TO PHQ QUESTIONS 1-9: 0

## 2023-03-03 NOTE — PROGRESS NOTES
1. \"Have you been to the ER, urgent care clinic since your last visit? Hospitalized since your last visit? \"  [facility]    2. \"Have you seen or consulted any other health care providers outside of the [address] [place] [address] since your last visit? \" No     3. For patients aged 39-70: Has the patient had a colonoscopy / FIT/ Cologuard? No      If the patient is female:    4. For patients aged 41-77: Has the patient had a mammogram within the past 2 years? Yes - Care Gap present. Most recent result on file      5. For patients aged 21-65: Has the patient had a pap smear?  NA - based on age or sex

## 2023-03-03 NOTE — PROGRESS NOTES
Roddy Davis is a 79 y.o. female who was seen in clinic today (3/3/2023) for Hypertension    Assessment & Plan:   Zelda Cloud was seen today for hypertension. Diagnoses and all orders for this visit:    Type 2 diabetes mellitus without complication, without long-term current use of insulin (HCC)  -     Comprehensive Metabolic Panel; Future  -     Lipid Panel; Future  -     Hemoglobin A1C; Future  -     Microalbumin / Creatinine Urine Ratio; Future    Essential (primary) hypertension  -     Comprehensive Metabolic Panel; Future  -     Lipid Panel; Future  -     losartan (COZAAR) 50 MG tablet; Take 1 tablet by mouth daily    Left-sided low back pain with left-sided sciatica, unspecified chronicity  -     XR LUMBAR SPINE (MIN 4 VIEWS); Future    Intermittent left-sided chest pain  -     [address] - Shen Zhu DO, [place], [facility] [place]    [place] orders  -     Discontinue: triamterene-hydroCHLOROthiazide (MAXZIDE) 75-50 MG per tablet; Take 1 tablet by mouth daily      Reinforced adhering to a low sodium diet and also decreasing carbohydrate intake. Continue 1/2 tab Maxzide 37.5/25 mg and add losartan as above for uncontrolled hypertension. I have discussed the diagnosis with the patient and the intended plan as seen in the above orders. The patient has received an after-visit summary and questions were answered concerning future plans. I have discussed medication side effects and warnings with the patient as well. Patient agreeable with above plan and verbalizes understanding. Return in about 2 weeks (around 3/17/2023) for nurse visit BP check. Subjective:   Hypertension ROS: taking medications as instructed, no medication side effects noted, no TIA's, no chest pain on exertion, no dyspnea on exertion, no swelling of ankles. Home blood pressure monitoring: is not measured at home  The following have been reviewed ER records and lab reports and imaging results.     New/other concerns: patient states she has been experiencing left leg pain. Comments she feels like she has been loosing her balance. States she her left legs makes her tired, more so when she is walking on concrete floor. She is under the care of Dr. John Branch for achilles tendinitis. Describes pain as aching like a toothache. She does wear support stockings and also comfortable shoes. Patient states she had a sharp pain in the morning over the left breast while up. Reports she has been experiencing pain intermittently. Requesting to see cardiology. Patient had a negative stress test on 1/24/2020. Patient did have have several PVC noted at that time on stress test.    Lab Results   Component Value Date/Time     02/15/2023 07:49 AM    K 3.8 02/15/2023 07:49 AM     02/15/2023 07:49 AM    CO2 28 02/15/2023 07:49 AM    BUN 13 02/15/2023 07:49 AM    GFRAA >60 02/09/2022 08:20 AM    GLOB 4.9 02/15/2023 07:49 AM    ALT 30 02/15/2023 07:49 AM    AST 22 02/15/2023 07:49 AM     Lab Results   Component Value Date/Time    CHOL 164 02/09/2022 08:20 AM    HDL 44 02/09/2022 08:20 AM     Lab Results   Component Value Date/Time    JJI4WLRD 5.5 10/10/2019 11:21 PM     No results found for: Ronal Mosley, [id number]    Lab Results   Component Value Date/Time    WBC 12.5 02/15/2023 07:49 AM    HGB 12.8 02/15/2023 07:49 AM    HCT 40.7 02/15/2023 07:49 AM     02/15/2023 07:49 AM    MCV 82.4 02/15/2023 07:49 AM     Wt Readings from Last 3 Encounters:   03/03/23 201 lb 12.8 oz (91.5 kg)   02/15/23 210 lb (95.3 kg)   01/17/23 207 lb 12.8 oz (94.3 kg)     Temp Readings from Last 3 Encounters:   03/03/23 98 °F (36.7 °C) (Temporal)   02/15/23 98.1 °F (36.7 °C) (Oral)     BP Readings from Last 3 Encounters:   03/03/23 (!) 163/86   02/15/23 131/71   02/08/22 (!) 156/92     Pulse Readings from Last 3 Encounters:   03/03/23 72   02/15/23 69   02/08/22 85     Prior to Admission medications    Medication Sig Start Date End Date Taking?  Authorizing Provider   diclofenac sodium (VOLTAREN) 1 % GEL Apply topically 4 times daily 10/10/19  Yes Ar Automatic Reconciliation   diclofenac (VOLTAREN) 50 MG EC tablet Take 50 mg by mouth 2 times daily as needed 12/16/21  Yes Ar Automatic Reconciliation   metFORMIN (GLUCOPHAGE-XR) 500 MG extended release tablet Take 500 mg by mouth Daily with supper 2/11/22  Yes Ar Automatic Reconciliation   triamcinolone (ARISTOCORT) 0.5 % cream Apply topically 2 times daily 9/4/20  Yes Ar Automatic Reconciliation   triamterene-hydroCHLOROthiazide (MAXZIDE-25) 37.5-25 MG per tablet Take 1 tablet by mouth daily 11/10/22  Yes Ar Automatic Reconciliation   famotidine (PEPCID) 40 MG tablet Take 40 mg by mouth 2 times daily as needed  Patient not taking: Reported on 3/3/2023 12/16/21   Ar Automatic Reconciliation     The following sections were reviewed & updated as appropriate: PMH, PSH, FH, and SH. Review of Systems   Constitutional:  Negative for chills, fatigue and fever. Respiratory:  Negative for chest tightness and shortness of breath. Cardiovascular:  Negative for chest pain and palpitations. Musculoskeletal:  Positive for arthralgias (left leg pain). Neurological:  Negative for dizziness and headaches. Objective:   BP (!) 163/86 (Site: Left Upper Arm, Position: Sitting, Cuff Size: Medium Adult)   Pulse 72   Temp 98 °F (36.7 °C) (Temporal)   Resp 18   Ht 5' 6\" (1.676 m)   Wt 201 lb 12.8 oz (91.5 kg)   SpO2 100%   BMI 32.57 kg/m²      Physical Exam  Constitutional:       Appearance: Normal appearance. HENT:      Head: Normocephalic and atraumatic. Cardiovascular:      Rate and Rhythm: Normal rate and regular rhythm. Pulses: Normal pulses. Heart sounds: Normal heart sounds. Pulmonary:      Effort: Pulmonary effort is normal.      Breath sounds: Normal breath sounds. Abdominal:      General: Bowel sounds are normal.      Palpations: Abdomen is soft. Tenderness:  There is no abdominal tenderness. Musculoskeletal:      Cervical back: Neck supple. Left ankle: Tenderness present. Left Achilles Tendon: Tenderness present. Skin:     General: Skin is warm. Neurological:      Mental Status: She is alert and oriented to person, place, and time. Disclaimer: The patient understands our medical plan. Alternatives have been explained and offered. The risks, benefits and significant side effects of all medications have been reviewed. Anticipated time course and progression of condition reviewed. All questions have been addressed. She is encouraged to employ the information provided in the after visit summary, which was reviewed. Where applicable, she is instructed to call the clinic if she has not been notified either by phone or through [address] with the results of her tests or with an appointment plan for any referrals within 1 week(s). No news is not good news; it's no news. The patient  is to call if her condition worsens or fails to improve or if significant side effects are experienced. Aspects of this note may have been generated using voice recognition software. Despite editing, there may be unrecognized errors. MARBIEL Garcia NP   An electronic signature was used to authenticate this note.

## 2023-03-19 ASSESSMENT — ENCOUNTER SYMPTOMS
CHEST TIGHTNESS: 0
SHORTNESS OF BREATH: 0

## 2023-03-20 ENCOUNTER — OFFICE VISIT (OUTPATIENT)
Age: 68
End: 2023-03-20
Payer: MEDICARE

## 2023-03-20 DIAGNOSIS — R60.9 EDEMA, UNSPECIFIED TYPE: ICD-10-CM

## 2023-03-20 DIAGNOSIS — I87.2 VENOUS INSUFFICIENCY OF BOTH LOWER EXTREMITIES: Primary | ICD-10-CM

## 2023-03-20 DIAGNOSIS — M19.90 ARTHRITIS: ICD-10-CM

## 2023-03-20 PROCEDURE — 3017F COLORECTAL CA SCREEN DOC REV: CPT | Performed by: ORTHOPAEDIC SURGERY

## 2023-03-20 PROCEDURE — 1124F ACP DISCUSS-NO DSCNMKR DOCD: CPT | Performed by: ORTHOPAEDIC SURGERY

## 2023-03-20 PROCEDURE — G8400 PT W/DXA NO RESULTS DOC: HCPCS | Performed by: ORTHOPAEDIC SURGERY

## 2023-03-20 PROCEDURE — 1036F TOBACCO NON-USER: CPT | Performed by: ORTHOPAEDIC SURGERY

## 2023-03-20 PROCEDURE — G8484 FLU IMMUNIZE NO ADMIN: HCPCS | Performed by: ORTHOPAEDIC SURGERY

## 2023-03-20 PROCEDURE — G8417 CALC BMI ABV UP PARAM F/U: HCPCS | Performed by: ORTHOPAEDIC SURGERY

## 2023-03-20 PROCEDURE — 1090F PRES/ABSN URINE INCON ASSESS: CPT | Performed by: ORTHOPAEDIC SURGERY

## 2023-03-20 PROCEDURE — 99214 OFFICE O/P EST MOD 30 MIN: CPT | Performed by: ORTHOPAEDIC SURGERY

## 2023-03-20 PROCEDURE — G8427 DOCREV CUR MEDS BY ELIG CLIN: HCPCS | Performed by: ORTHOPAEDIC SURGERY

## 2023-03-20 NOTE — LETTER
March 20, 2023       Cindy Fung YOB: 1955   Indra Brody Date of Visit:  3/20/2023       To Whom It May Concern: It is my medical opinion that Alejandrina Clifton {Work release (duty restriction):04407}. If you have any questions or concerns, please don't hesitate to call.     Sincerely,        Misa Patel MD

## 2023-03-20 NOTE — PROGRESS NOTES
intermittently gets discolored. She reports that she tends to be more unsteady on her feet, especially after she tries to stand after sitting for a while. There were no vitals taken for this visit. Appearance: Alert, well appearing and pleasant patient who is in no distress, oriented to person, place/time, and who follows commands. Normal dress/motor activity/thought processes/memory. This patient is accompanied in the examination room by her self. Patient arrives to office via: with assistive device: compression stocking. Footwear: house shoes    Psychiatric:  Normal Affect/mood. Judgement, behavior, and conduct are appropriate. Speech normal in context and clarity, memory intact grossly, no involuntary movements - tremors. H EENT (2): Head normocephalic & atraumatic. Eye: pupils are round// EOM are intact // Neck: ROM WNL  // Hearings Intact  Respiratory: Breathing non labored     ANKLE/FOOT Bilateral    Gait:  normal- wears compression stockings  Tenderness: Right: mild tenderness achilles. Left:mild PMT achilles insertion  Cutaneous: Right: 2 plus welling //skin discoloration, left foot and ankle region, consistent with venous stasis disease  Joint Motion: ANKLE WNL   Joint / Tendon Stability:  No Ankle or Subtalar instability or joint laxity. No peroneal sublux ability or dislocation  Alignment: neutral Hindfoot,    Neuro Motor/Sensory: NL/NL  Vascular: NL foot/ankle pulses,   Lymphatics: No extremity lymphedema, No calf swelling, no tenderness to calf muscles.       RADIOGRAPHS// IMAGING//DIAGNOSTIC DATA     Orders Placed This Encounter   Procedures    External Referral To Vascular Surgery     Referral Priority:   Routine     Referral Type:   Eval and Treat     Referral Reason:   Specialty Services Required     Referred to Provider:   Deepika Mcmahan MD     Requested Specialty:   Vascular Surgery     Number of Visits Requested:   1                  CHART REVIEW     Africa Brooks

## 2023-03-22 ENCOUNTER — NURSE ONLY (OUTPATIENT)
Age: 68
End: 2023-03-22

## 2023-03-22 VITALS — HEART RATE: 85 BPM | DIASTOLIC BLOOD PRESSURE: 78 MMHG | SYSTOLIC BLOOD PRESSURE: 144 MMHG

## 2023-03-22 DIAGNOSIS — Z01.30 BLOOD PRESSURE CHECK: Primary | ICD-10-CM

## 2023-03-22 NOTE — PROGRESS NOTES
Patient came in to the office today for a blood pressure check. Patient was brought back to a quiet area for 10 minutes. Patient stated she took her blood pressure medication at 9:00 this morning. Patient is taking Losartan 50mg and Maxzide 37.5-25mg. Patient's last blood pressure reading in the office on 3/3/23 was 163/86 Pulse 72. Patient's blood pressure reading today in the office is 144/78 Pulse 85 (3:36) and recheck 140/75 Pulse 82(3:37). FANTASMA Thao has been notified. Per FANTASMA Thao patient is to make an in office appointment in 2 months for Diabetes/Hypertension,continue medication as prescribed and watch salt intake. Patient verbalized understanding of all instructions.

## 2023-03-23 ENCOUNTER — TELEPHONE (OUTPATIENT)
Age: 68
End: 2023-03-23

## 2023-05-22 PROBLEM — Q82.0 PRIMARY (CONGENITAL) LYMPHEDEMA: Status: ACTIVE | Noted: 2020-07-27

## 2023-05-22 PROBLEM — I87.2 CHRONIC VENOUS INSUFFICIENCY OF LOWER EXTREMITY: Status: ACTIVE | Noted: 2020-07-27

## 2023-05-23 ENCOUNTER — OFFICE VISIT (OUTPATIENT)
Age: 68
End: 2023-05-23
Payer: MEDICARE

## 2023-05-23 VITALS
DIASTOLIC BLOOD PRESSURE: 84 MMHG | WEIGHT: 204 LBS | HEART RATE: 83 BPM | HEIGHT: 66 IN | BODY MASS INDEX: 32.78 KG/M2 | OXYGEN SATURATION: 99 % | SYSTOLIC BLOOD PRESSURE: 144 MMHG

## 2023-05-23 DIAGNOSIS — E11.9 TYPE 2 DIABETES MELLITUS WITHOUT COMPLICATION, WITHOUT LONG-TERM CURRENT USE OF INSULIN (HCC): ICD-10-CM

## 2023-05-23 DIAGNOSIS — R94.31 ABNORMAL EKG: ICD-10-CM

## 2023-05-23 DIAGNOSIS — I10 PRIMARY HYPERTENSION: ICD-10-CM

## 2023-05-23 DIAGNOSIS — R07.9 CHEST PAIN, UNSPECIFIED TYPE: Primary | ICD-10-CM

## 2023-05-23 DIAGNOSIS — M79.89 LEG SWELLING: ICD-10-CM

## 2023-05-23 PROCEDURE — G8400 PT W/DXA NO RESULTS DOC: HCPCS | Performed by: INTERNAL MEDICINE

## 2023-05-23 PROCEDURE — G8427 DOCREV CUR MEDS BY ELIG CLIN: HCPCS | Performed by: INTERNAL MEDICINE

## 2023-05-23 PROCEDURE — 3051F HG A1C>EQUAL 7.0%<8.0%: CPT | Performed by: INTERNAL MEDICINE

## 2023-05-23 PROCEDURE — 93000 ELECTROCARDIOGRAM COMPLETE: CPT | Performed by: INTERNAL MEDICINE

## 2023-05-23 PROCEDURE — 1036F TOBACCO NON-USER: CPT | Performed by: INTERNAL MEDICINE

## 2023-05-23 PROCEDURE — 3017F COLORECTAL CA SCREEN DOC REV: CPT | Performed by: INTERNAL MEDICINE

## 2023-05-23 PROCEDURE — 1090F PRES/ABSN URINE INCON ASSESS: CPT | Performed by: INTERNAL MEDICINE

## 2023-05-23 PROCEDURE — 3077F SYST BP >= 140 MM HG: CPT | Performed by: INTERNAL MEDICINE

## 2023-05-23 PROCEDURE — 3079F DIAST BP 80-89 MM HG: CPT | Performed by: INTERNAL MEDICINE

## 2023-05-23 PROCEDURE — 2022F DILAT RTA XM EVC RTNOPTHY: CPT | Performed by: INTERNAL MEDICINE

## 2023-05-23 PROCEDURE — 99204 OFFICE O/P NEW MOD 45 MIN: CPT | Performed by: INTERNAL MEDICINE

## 2023-05-23 PROCEDURE — 1124F ACP DISCUSS-NO DSCNMKR DOCD: CPT | Performed by: INTERNAL MEDICINE

## 2023-05-23 PROCEDURE — G8417 CALC BMI ABV UP PARAM F/U: HCPCS | Performed by: INTERNAL MEDICINE

## 2023-05-23 ASSESSMENT — PATIENT HEALTH QUESTIONNAIRE - PHQ9
SUM OF ALL RESPONSES TO PHQ QUESTIONS 1-9: 0
2. FEELING DOWN, DEPRESSED OR HOPELESS: 0
SUM OF ALL RESPONSES TO PHQ QUESTIONS 1-9: 0
SUM OF ALL RESPONSES TO PHQ9 QUESTIONS 1 & 2: 0
1. LITTLE INTEREST OR PLEASURE IN DOING THINGS: 0

## 2023-05-23 ASSESSMENT — ENCOUNTER SYMPTOMS
VOMITING: 0
COUGH: 0
NAUSEA: 0
ABDOMINAL DISTENTION: 0
ABDOMINAL PAIN: 0
SHORTNESS OF BREATH: 0
SORE THROAT: 0

## 2023-05-23 NOTE — PROGRESS NOTES
05/23/23     Idalia Libman  is a 79 y.o. female     Chief Complaint   Patient presents with    New Patient     Ref by PCP for Chest Pain       HPI    Patient presents for a new office visit. She was referred here by her PCP for evaluation of chest pain. She has a past medical history significant for hypertension and type 2 diabetes. She does not have a prior cardiac history. She was seen in the emergency room approximately 3 months ago for an episode of left-sided chest pain which she describes as a sharp pain which radiated through her breast into her back. This occurred while driving. The episode only lasted for seconds before resolving but then reoccurred several times that day, so she decided to seek medical attention. In the emergency room, she was found to have an EKG consistent with left ventricular hypertrophy, a chest x-ray with a mildly increased cardiac silhouette, normal troponin levels x2 and was discharged home. She has not had any recurrence of these symptoms. She does admit to recurrent leg swelling which worsens throughout the day and will improve if she elevates her legs. This is worse when she is on her feet for long periods of time. She denies any orthopnea, PND or shortness of breath.     Past Medical History:   Diagnosis Date    Essential hypertension, benign      Current Outpatient Medications   Medication Sig Dispense Refill    losartan (COZAAR) 50 MG tablet Take 1 tablet by mouth daily 30 tablet 5    diclofenac sodium (VOLTAREN) 1 % GEL Apply 2 g topically 4 times daily      diclofenac (VOLTAREN) 50 MG EC tablet Take 1 tablet by mouth 2 times daily as needed      famotidine (PEPCID) 40 MG tablet Take 1 tablet by mouth 2 times daily as needed      metFORMIN (GLUCOPHAGE-XR) 500 MG extended release tablet Take 1 tablet by mouth Daily with supper      triamcinolone (ARISTOCORT) 0.5 % cream Apply 2 g topically 2 times daily       No current facility-administered medications for this

## 2023-05-23 NOTE — PROGRESS NOTES
Evelyne Gutierrez presents today for   Chief Complaint   Patient presents with    New Patient     Ref by PCP for Chest Pain       Evelyne Gutierrez preferred language for health care discussion is english/other. Is someone accompanying this pt? no    Is the patient using any DME equipment during OV? no    Depression Screening:  Depression: Not at risk    PHQ-2 Score: 0        Learning Assessment:  Who is the primary learner? Patient    What is the preferred language for health care of the primary learner? ENGLISH    How does the primary learner prefer to learn new concepts? DEMONSTRATION    Answered By patient    Relationship to Learner SELF           Pt currently taking Anticoagulant therapy? no    Pt currently taking Antiplatelet therapy ? no      Coordination of Care:  1. Have you been to the ER, urgent care clinic since your last visit? Hospitalized since your last visit? yes    2. Have you seen or consulted any other health care providers outside of the [address] [place] [address] since your last visit? Include any pap smears or colon screening.  no

## 2023-06-07 ENCOUNTER — TELEPHONE (OUTPATIENT)
Age: 68
End: 2023-06-07

## 2023-06-19 ENCOUNTER — OFFICE VISIT (OUTPATIENT)
Age: 68
End: 2023-06-19
Payer: MEDICARE

## 2023-06-19 VITALS
BODY MASS INDEX: 32.14 KG/M2 | TEMPERATURE: 98 F | RESPIRATION RATE: 18 BRPM | HEART RATE: 82 BPM | SYSTOLIC BLOOD PRESSURE: 145 MMHG | OXYGEN SATURATION: 98 % | DIASTOLIC BLOOD PRESSURE: 71 MMHG | HEIGHT: 66 IN | WEIGHT: 200 LBS

## 2023-06-19 DIAGNOSIS — Z00.00 MEDICARE ANNUAL WELLNESS VISIT, SUBSEQUENT: Primary | ICD-10-CM

## 2023-06-19 DIAGNOSIS — Z72.89 OTHER PROBLEMS RELATED TO LIFESTYLE: ICD-10-CM

## 2023-06-19 DIAGNOSIS — E11.9 TYPE 2 DIABETES MELLITUS WITHOUT COMPLICATION, WITHOUT LONG-TERM CURRENT USE OF INSULIN (HCC): ICD-10-CM

## 2023-06-19 DIAGNOSIS — Z78.0 ASYMPTOMATIC MENOPAUSAL STATE: ICD-10-CM

## 2023-06-19 DIAGNOSIS — Z11.59 NEED FOR HEPATITIS C SCREENING TEST: ICD-10-CM

## 2023-06-19 DIAGNOSIS — Z12.31 ENCOUNTER FOR SCREENING MAMMOGRAM FOR MALIGNANT NEOPLASM OF BREAST: ICD-10-CM

## 2023-06-19 PROCEDURE — 3051F HG A1C>EQUAL 7.0%<8.0%: CPT | Performed by: NURSE PRACTITIONER

## 2023-06-19 PROCEDURE — 2022F DILAT RTA XM EVC RTNOPTHY: CPT | Performed by: NURSE PRACTITIONER

## 2023-06-19 PROCEDURE — 1036F TOBACCO NON-USER: CPT | Performed by: NURSE PRACTITIONER

## 2023-06-19 PROCEDURE — 1124F ACP DISCUSS-NO DSCNMKR DOCD: CPT | Performed by: NURSE PRACTITIONER

## 2023-06-19 PROCEDURE — 1090F PRES/ABSN URINE INCON ASSESS: CPT | Performed by: NURSE PRACTITIONER

## 2023-06-19 PROCEDURE — 3078F DIAST BP <80 MM HG: CPT | Performed by: NURSE PRACTITIONER

## 2023-06-19 PROCEDURE — 3074F SYST BP LT 130 MM HG: CPT | Performed by: NURSE PRACTITIONER

## 2023-06-19 PROCEDURE — 3017F COLORECTAL CA SCREEN DOC REV: CPT | Performed by: NURSE PRACTITIONER

## 2023-06-19 PROCEDURE — G8427 DOCREV CUR MEDS BY ELIG CLIN: HCPCS | Performed by: NURSE PRACTITIONER

## 2023-06-19 PROCEDURE — G8400 PT W/DXA NO RESULTS DOC: HCPCS | Performed by: NURSE PRACTITIONER

## 2023-06-19 PROCEDURE — G8417 CALC BMI ABV UP PARAM F/U: HCPCS | Performed by: NURSE PRACTITIONER

## 2023-06-19 PROCEDURE — 99214 OFFICE O/P EST MOD 30 MIN: CPT | Performed by: NURSE PRACTITIONER

## 2023-06-19 PROCEDURE — G0439 PPPS, SUBSEQ VISIT: HCPCS | Performed by: NURSE PRACTITIONER

## 2023-06-19 RX ORDER — HYDROCHLOROTHIAZIDE 12.5 MG/1
12.5 CAPSULE, GELATIN COATED ORAL EVERY MORNING
Qty: 30 CAPSULE | Refills: 2 | Status: SHIPPED | OUTPATIENT
Start: 2023-06-19

## 2023-06-19 ASSESSMENT — PATIENT HEALTH QUESTIONNAIRE - PHQ9
SUM OF ALL RESPONSES TO PHQ9 QUESTIONS 1 & 2: 0
1. LITTLE INTEREST OR PLEASURE IN DOING THINGS: 0
SUM OF ALL RESPONSES TO PHQ QUESTIONS 1-9: 0
2. FEELING DOWN, DEPRESSED OR HOPELESS: 0

## 2023-06-19 ASSESSMENT — LIFESTYLE VARIABLES
HOW MANY STANDARD DRINKS CONTAINING ALCOHOL DO YOU HAVE ON A TYPICAL DAY: PATIENT DOES NOT DRINK
HOW OFTEN DO YOU HAVE A DRINK CONTAINING ALCOHOL: NEVER

## 2023-06-21 ENCOUNTER — HOSPITAL ENCOUNTER (OUTPATIENT)
Facility: HOSPITAL | Age: 68
Discharge: HOME | End: 2023-06-24
Payer: MEDICARE

## 2023-06-21 DIAGNOSIS — M54.42 LEFT-SIDED LOW BACK PAIN WITH LEFT-SIDED SCIATICA, UNSPECIFIED CHRONICITY: ICD-10-CM

## 2023-06-21 PROCEDURE — 72110 X-RAY EXAM L-2 SPINE 4/>VWS: CPT

## 2023-07-05 ENCOUNTER — TELEPHONE (OUTPATIENT)
Age: 68
End: 2023-07-05

## 2023-07-05 DIAGNOSIS — M25.571 PAIN IN RIGHT ANKLE AND JOINTS OF RIGHT FOOT: ICD-10-CM

## 2023-07-05 DIAGNOSIS — M19.071 PRIMARY OSTEOARTHRITIS, RIGHT ANKLE AND FOOT: ICD-10-CM

## 2023-07-05 DIAGNOSIS — M76.62 ACHILLES TENDINITIS, LEFT LEG: Primary | ICD-10-CM

## 2023-07-05 NOTE — TELEPHONE ENCOUNTER
Patient came in this morning and asked if Dr. Humza Serna would put in an order for PT for her. Her mobility is getting very bad in both legs & ankles. She is starting to get off balance, and feels her legs lock up. She feels the PT will help her. If you have any questions please feel free to call her.

## 2023-07-06 NOTE — PROGRESS NOTES
Valarie Hinojosa   [address] [address]    Medications below, sent to the pharmacy      Orders Placed This [facility] THE [facility] - In [address] [place] [address]     Referral Priority:   Routine     Referral Type:   Eval and Treat     Referral Reason:   Specialty Services Required     Requested Specialty:   Physical Therapist     Number of Visits Requested:   1           Dede Funes MD  7/5/2023  9:30 PM

## 2023-07-28 ENCOUNTER — TELEPHONE (OUTPATIENT)
Age: 68
End: 2023-07-28

## 2023-07-28 ENCOUNTER — HOSPITAL ENCOUNTER (OUTPATIENT)
Facility: HOSPITAL | Age: 68
Setting detail: RECURRING SERIES
Discharge: HOME | End: 2023-07-31
Payer: MEDICARE

## 2023-07-28 PROCEDURE — 97162 PT EVAL MOD COMPLEX 30 MIN: CPT

## 2023-07-28 PROCEDURE — 97110 THERAPEUTIC EXERCISES: CPT

## 2023-07-28 PROCEDURE — 97535 SELF CARE MNGMENT TRAINING: CPT

## 2023-07-28 NOTE — PROGRESS NOTES
PHYSICAL / OCCUPATIONAL THERAPY - DAILY TREATMENT NOTE (updated )    Patient Name: Phil Pressley    Date: 2023    : 1955  Insurance: Payor: MEDICARE / Plan: MEDICARE PART A AND B / Product Type: *No Product type* /      Patient  verified Yes     Visit #   Current / Total 1 12   Time   In / Out 10:35 11:10   Pain   In / Out 10 10   Subjective Functional Status/Changes: Agreeable to initial evaluation. Changes to: Allergies, Med Hx, Sx Hx?   no       TREATMENT AREA =  Achilles tendinitis, left leg [M76.62]  Pain in right ankle and joints of right foot [M25.571]    OBJECTIVE    12 min Evaluation untimed     Therapeutic Procedures: Tx Min Billable or 1:1 Min (if diff from Tx Min) Procedure, Rationale, Specifics   10  38239 Therapeutic Exercise (timed):  increase ROM, strength, coordination, balance, and proprioception to improve patient's ability to progress to PLOF and address remaining functional goals. (see flow sheet as applicable)     Details if applicable:  HEP creation and instruction     13  91727 Self Care/Home Management (timed):  improve patient knowledge and understanding of pain reducing techniques, diagnosis/prognosis, and physical therapy expectations, procedures and progression  to improve patient's ability to progress to PLOF and address remaining functional goals.   (see flow sheet as applicable)     Details if applicable:     21  [facility] BC Totals Reminder: bill using total billable min of TIMED therapeutic procedures (example: do not include dry needle or estim unattended, both untimed codes, in totals to left)  8-22 min = 1 unit; 23-37 min = 2 units; 38-52 min = 3 units; 53-67 min = 4 units; 68-82 min = 5 units   Total Total     TOTAL TREATMENT TIME:        35     []  Patient Education billed concurrently with other procedures   [] Review HEP    [] Progressed/Changed HEP, detail:    [] Other detail:       Objective Information/Functional Measures/Assessment    Patient is 79 year
Standardized tests and measures addressin body structure, function, activity limitation and / or participation in recreation  ;Presentation:  MEDIUM Complexity : Evolving with changing characteristics  ; Clinical Decision Making:  MEDIUM Complexity : FOTO score of 26-74 FOTO score = an established functional score where 100 = no disability  Overall Complexity Rating: MEDIUM  Problem List: pain affecting function, decrease ROM, decrease strength, edema affection function, impaired gait/balance, decrease ADL/functional abilities, decrease activity tolerance, decrease flexibility/joint mobility, and decrease transfer abilities    Treatment Plan may include any combination of the followin Therapeutic Exercise, 93568 Neuromuscular Re-Education, 35675 Manual Therapy, 11603 Therapeutic Activity, 62627 Self Care/Home Management, and 66139 Gait Training  Patient / Family readiness to learn indicated by: asking questions, trying to perform skills, and interest  Persons(s) to be included in education: patient (P)  Barriers to Learning/Limitations: none  Measures taken if barriers to learning present: N/A  Patient Goal (s): \"Improve my mobility\"  Patient Self Reported Health Status: good  Rehabilitation Potential: fair    Short Term Goals: To be accomplished in 2 weeks  Patient to be independent in HEP to maximize therapeutic effect of care plan. Long Term Goals: To be accomplished in 6 weeks  Patient to improve jose guadalupe ankle DF to 8 deg for improved gait mechanics. Patient to maintain SLS >15 seconds bilaterally to demonstrate improved stability for safety in negotiating environment. Patient to demonstrate bilateral heel raise x10 with <1-2 point increase in pain to indicate improved functional strength for ease of stair climbing. Patient to improve FOTO score to 54 to indicate progression in functional capacity.      Frequency / Duration: Patient to be seen 1-2 times per week for 6weeks  Goals will be assigned and

## 2023-07-31 ENCOUNTER — TELEPHONE (OUTPATIENT)
Age: 68
End: 2023-07-31

## 2023-07-31 NOTE — TELEPHONE ENCOUNTER
Can patient get a note for not being able to go to work last night because her ankles were so swollen she couldn't stand and was unable to perform her duties. Does not start PT until 8/1.    If you have any questions please call her at [phone]

## 2023-08-01 ENCOUNTER — HOSPITAL ENCOUNTER (OUTPATIENT)
Facility: HOSPITAL | Age: 68
Setting detail: RECURRING SERIES
Discharge: HOME | End: 2023-08-04
Payer: MEDICARE

## 2023-08-01 PROCEDURE — 97112 NEUROMUSCULAR REEDUCATION: CPT

## 2023-08-01 PROCEDURE — 97110 THERAPEUTIC EXERCISES: CPT

## 2023-08-01 NOTE — PROGRESS NOTES
PHYSICAL / OCCUPATIONAL THERAPY - DAILY TREATMENT NOTE (updated )    Patient Name: Bouchra Zuleta    Date: 2023    : 1955  Insurance: Payor: MEDICARE / Plan: MEDICARE PART A AND B / Product Type: *No Product type* /      Patient  verified Yes     Visit #   Current / Total 2 12   Time   In / Out 11:13 11:55   Pain   In / Out 10 9   Subjective Functional Status/Changes: Patient stated her pain level is always high. Changes to: Allergies, Med Hx, Sx Hx?   no       TREATMENT AREA =  Achilles tendinitis, left leg [M76.62]  Pain in right ankle and joints of right foot [M25.571]    OBJECTIVE      Therapeutic Procedures: Tx Min Billable or 1:1 Min (if diff from Tx Min) Procedure, Rationale, Specifics   34 34 69587 Therapeutic Exercise (timed):  increase ROM, strength, coordination, balance, and proprioception to improve patient's ability to progress to PLOF and address remaining functional goals. (see flow sheet as applicable)     Details if applicable:  seated Heel slide-10x each      8 8 24214 Neuromuscular Re-Education (timed):  improve balance, coordination, kinesthetic sense, posture, core stability and proprioception to improve patient's ability to develop conscious control of individual muscles and awareness of position of extremities in order to progress to PLOF and address remaining functional goals.  (see flow sheet as applicable)     Details if applicable:            Details if applicable:            Details if applicable:            Details if applicable:     43 42 [facility] Totals Reminder: bill using total billable min of TIMED therapeutic procedures (example: do not include dry needle or estim unattended, both untimed codes, in totals to left)  8-22 min = 1 unit; 23-37 min = 2 units; 38-52 min = 3 units; 53-67 min = 4 units; 68-82 min = 5 units   Total Total     TOTAL TREATMENT TIME:        42     [x]  Patient Education billed concurrently with other procedures   [x] Review HEP    []

## 2023-08-02 ENCOUNTER — HOSPITAL ENCOUNTER (OUTPATIENT)
Facility: HOSPITAL | Age: 68
Setting detail: SPECIMEN
Discharge: HOME | End: 2023-08-05
Payer: MEDICARE

## 2023-08-02 ENCOUNTER — OFFICE VISIT (OUTPATIENT)
Age: 68
End: 2023-08-02
Payer: MEDICARE

## 2023-08-02 VITALS
WEIGHT: 197.8 LBS | BODY MASS INDEX: 31.79 KG/M2 | SYSTOLIC BLOOD PRESSURE: 143 MMHG | HEIGHT: 66 IN | DIASTOLIC BLOOD PRESSURE: 87 MMHG | TEMPERATURE: 98 F | RESPIRATION RATE: 18 BRPM | OXYGEN SATURATION: 100 % | HEART RATE: 74 BPM

## 2023-08-02 DIAGNOSIS — M51.36 DDD (DEGENERATIVE DISC DISEASE), LUMBAR: ICD-10-CM

## 2023-08-02 DIAGNOSIS — E11.9 TYPE 2 DIABETES MELLITUS WITHOUT COMPLICATION, WITHOUT LONG-TERM CURRENT USE OF INSULIN (HCC): ICD-10-CM

## 2023-08-02 DIAGNOSIS — E11.9 TYPE 2 DIABETES MELLITUS WITHOUT COMPLICATION, WITHOUT LONG-TERM CURRENT USE OF INSULIN (HCC): Primary | ICD-10-CM

## 2023-08-02 LAB
25(OH)D3 SERPL-MCNC: 20.4 NG/ML (ref 30–100)
ALBUMIN SERPL-MCNC: 3.4 G/DL (ref 3.4–5)
ALBUMIN/GLOB SERPL: 0.7 (ref 0.8–1.7)
ALP SERPL-CCNC: 105 U/L (ref 45–117)
ALT SERPL-CCNC: 36 U/L (ref 13–56)
ANION GAP SERPL CALC-SCNC: 3 MMOL/L (ref 3–18)
AST SERPL-CCNC: 21 U/L (ref 10–38)
BASOPHILS # BLD: 0.1 K/UL (ref 0–0.1)
BASOPHILS NFR BLD: 1 % (ref 0–2)
BILIRUB SERPL-MCNC: 0.4 MG/DL (ref 0.2–1)
BUN SERPL-MCNC: 19 MG/DL (ref 7–18)
BUN/CREAT SERPL: 24 (ref 12–20)
CALCIUM SERPL-MCNC: 9.2 MG/DL (ref 8.5–10.1)
CHLORIDE SERPL-SCNC: 106 MMOL/L (ref 100–111)
CHOLEST SERPL-MCNC: 187 MG/DL
CO2 SERPL-SCNC: 30 MMOL/L (ref 21–32)
CREAT SERPL-MCNC: 0.78 MG/DL (ref 0.6–1.3)
DIFFERENTIAL METHOD BLD: ABNORMAL
EOSINOPHIL # BLD: 0.2 K/UL (ref 0–0.4)
EOSINOPHIL NFR BLD: 2 % (ref 0–5)
ERYTHROCYTE [DISTWIDTH] IN BLOOD BY AUTOMATED COUNT: 14.9 % (ref 11.6–14.5)
EST. AVERAGE GLUCOSE BLD GHB EST-MCNC: 146 MG/DL
GLOBULIN SER CALC-MCNC: 4.6 G/DL (ref 2–4)
GLUCOSE SERPL-MCNC: 98 MG/DL (ref 74–99)
HBA1C MFR BLD: 6.7 % (ref 4.2–5.6)
HCT VFR BLD AUTO: 43.8 % (ref 35–45)
HDLC SERPL-MCNC: 49 MG/DL (ref 40–60)
HDLC SERPL: 3.8 (ref 0–5)
HGB BLD-MCNC: 13.2 G/DL (ref 12–16)
IMM GRANULOCYTES # BLD AUTO: 0 K/UL (ref 0–0.04)
IMM GRANULOCYTES NFR BLD AUTO: 0 % (ref 0–0.5)
LDLC SERPL CALC-MCNC: 108.4 MG/DL (ref 0–100)
LIPID PANEL: ABNORMAL
LYMPHOCYTES # BLD: 2.9 K/UL (ref 0.9–3.6)
LYMPHOCYTES NFR BLD: 35 % (ref 21–52)
MCH RBC QN AUTO: 25.7 PG (ref 24–34)
MCHC RBC AUTO-ENTMCNC: 30.1 G/DL (ref 31–37)
MCV RBC AUTO: 85.4 FL (ref 78–100)
MONOCYTES # BLD: 0.4 K/UL (ref 0.05–1.2)
MONOCYTES NFR BLD: 4 % (ref 3–10)
NEUTS SEG # BLD: 4.9 K/UL (ref 1.8–8)
NEUTS SEG NFR BLD: 58 % (ref 40–73)
NRBC # BLD: 0 K/UL (ref 0–0.01)
NRBC BLD-RTO: 0 PER 100 WBC
PLATELET # BLD AUTO: 311 K/UL (ref 135–420)
PMV BLD AUTO: 11.5 FL (ref 9.2–11.8)
POTASSIUM SERPL-SCNC: 4 MMOL/L (ref 3.5–5.5)
PROT SERPL-MCNC: 8 G/DL (ref 6.4–8.2)
RBC # BLD AUTO: 5.13 M/UL (ref 4.2–5.3)
SODIUM SERPL-SCNC: 139 MMOL/L (ref 136–145)
TRIGL SERPL-MCNC: 148 MG/DL
VLDLC SERPL CALC-MCNC: 29.6 MG/DL
WBC # BLD AUTO: 8.5 K/UL (ref 4.6–13.2)

## 2023-08-02 PROCEDURE — 36415 COLL VENOUS BLD VENIPUNCTURE: CPT

## 2023-08-02 PROCEDURE — 83036 HEMOGLOBIN GLYCOSYLATED A1C: CPT

## 2023-08-02 PROCEDURE — 80053 COMPREHEN METABOLIC PANEL: CPT

## 2023-08-02 PROCEDURE — 99211 OFF/OP EST MAY X REQ PHY/QHP: CPT

## 2023-08-02 PROCEDURE — 3078F DIAST BP <80 MM HG: CPT | Performed by: NURSE PRACTITIONER

## 2023-08-02 PROCEDURE — 1090F PRES/ABSN URINE INCON ASSESS: CPT | Performed by: NURSE PRACTITIONER

## 2023-08-02 PROCEDURE — 2022F DILAT RTA XM EVC RTNOPTHY: CPT | Performed by: NURSE PRACTITIONER

## 2023-08-02 PROCEDURE — 82306 VITAMIN D 25 HYDROXY: CPT

## 2023-08-02 PROCEDURE — 3074F SYST BP LT 130 MM HG: CPT | Performed by: NURSE PRACTITIONER

## 2023-08-02 PROCEDURE — 99213 OFFICE O/P EST LOW 20 MIN: CPT | Performed by: NURSE PRACTITIONER

## 2023-08-02 PROCEDURE — 80061 LIPID PANEL: CPT

## 2023-08-02 PROCEDURE — 3017F COLORECTAL CA SCREEN DOC REV: CPT | Performed by: NURSE PRACTITIONER

## 2023-08-02 PROCEDURE — 1123F ACP DISCUSS/DSCN MKR DOCD: CPT | Performed by: NURSE PRACTITIONER

## 2023-08-02 PROCEDURE — 1036F TOBACCO NON-USER: CPT | Performed by: NURSE PRACTITIONER

## 2023-08-02 PROCEDURE — 85025 COMPLETE CBC W/AUTO DIFF WBC: CPT

## 2023-08-02 PROCEDURE — G8427 DOCREV CUR MEDS BY ELIG CLIN: HCPCS | Performed by: NURSE PRACTITIONER

## 2023-08-02 PROCEDURE — 3044F HG A1C LEVEL LT 7.0%: CPT | Performed by: NURSE PRACTITIONER

## 2023-08-02 PROCEDURE — G8417 CALC BMI ABV UP PARAM F/U: HCPCS | Performed by: NURSE PRACTITIONER

## 2023-08-02 PROCEDURE — G8400 PT W/DXA NO RESULTS DOC: HCPCS | Performed by: NURSE PRACTITIONER

## 2023-08-02 ASSESSMENT — PATIENT HEALTH QUESTIONNAIRE - PHQ9
SUM OF ALL RESPONSES TO PHQ QUESTIONS 1-9: 0
SUM OF ALL RESPONSES TO PHQ9 QUESTIONS 1 & 2: 0
SUM OF ALL RESPONSES TO PHQ QUESTIONS 1-9: 0
2. FEELING DOWN, DEPRESSED OR HOPELESS: 0
SUM OF ALL RESPONSES TO PHQ QUESTIONS 1-9: 0
1. LITTLE INTEREST OR PLEASURE IN DOING THINGS: 0
SUM OF ALL RESPONSES TO PHQ QUESTIONS 1-9: 0

## 2023-08-02 ASSESSMENT — ENCOUNTER SYMPTOMS
SHORTNESS OF BREATH: 0
BACK PAIN: 1
CHEST TIGHTNESS: 0

## 2023-08-02 NOTE — PROGRESS NOTES
1. \"Have you been to the ER, urgent care clinic since your last visit? Hospitalized since your last visit? \" No    2. \"Have you seen or consulted any other health care providers outside of the [address] [place] [address] since your last visit? \" No     3. For patients aged 43-73: Has the patient had a colonoscopy / FIT/ Cologuard? No      If the patient is female:    4. For patients aged 43-66: Has the patient had a mammogram within the past 2 years? Yes - Care Gap present. Most recent result on file      5. For patients aged 21-65: Has the patient had a pap smear?  NA - based on age or sex

## 2023-08-02 NOTE — PROGRESS NOTES
Irene Machado is a 79 y.o. female who was seen in clinic today (8/2/2023) for Hypertension and Edema    Assessment & Plan:   Nikia Wolff was seen today for hypertension and edema. Diagnoses and all orders for this visit:    Type 2 diabetes mellitus without complication, without long-term current use of insulin (HCC)  -     Cancel: Lipid Panel; Future  -     Cancel: Hemoglobin A1C; Future  -     Cancel: CBC with Auto Differential; Future  -     Cancel: Comprehensive Metabolic Panel; Future  -     Cancel: Vitamin D 25 Hydroxy; Future  -     Vitamin D 25 Hydroxy; Future  -     Comprehensive Metabolic Panel; Future  -     CBC with Auto Differential; Future  -     Hemoglobin A1C; Future  -     Lipid Panel; Future    BMI 30.0-30.9,adult  -     Cancel: Vitamin D 25 Hydroxy; Future  -     Vitamin D 25 Hydroxy; Future    DDD (degenerative disc disease), lumbar  -     Ambulatory referral to [facility]       I have discussed the diagnosis with the patient and the intended plan as seen in the above orders. The patient has received an after-visit summary and questions were answered concerning future plans. I have discussed medication side effects and warnings with the patient as well. Patient agreeable with above plan and verbalizes understanding. Return in about 4 months (around 12/2/2023) for DM/HTN/HLD, fasting labs 1 week prior, in office. Subjective:   Hypertension ROS: taking medications as instructed, no medication side effects noted, no TIA's, no chest pain on exertion, no dyspnea on exertion, no swelling of ankles. Hasn't taken medication today, fasting for labs.   Home blood pressure monitoring: is not measured at home  Lifestyle/diet:  she does attempt to adhere to low sodium diet at times ; limit salt intake by removing salt shakers from the table  The following have been reviewed orders written for new lab studies as appropriate; see orders    New/other concerns: reports she does continue to have

## 2023-08-03 ENCOUNTER — HOSPITAL ENCOUNTER (OUTPATIENT)
Facility: HOSPITAL | Age: 68
Setting detail: RECURRING SERIES
Discharge: HOME | End: 2023-08-06
Payer: MEDICARE

## 2023-08-03 ENCOUNTER — OFFICE VISIT (OUTPATIENT)
Age: 68
End: 2023-08-03

## 2023-08-03 DIAGNOSIS — M54.41 CHRONIC BILATERAL LOW BACK PAIN WITH RIGHT-SIDED SCIATICA: ICD-10-CM

## 2023-08-03 DIAGNOSIS — I87.2 VENOUS INSUFFICIENCY OF BOTH LOWER EXTREMITIES: Primary | ICD-10-CM

## 2023-08-03 DIAGNOSIS — R26.9 ABNORMAL GAIT: ICD-10-CM

## 2023-08-03 DIAGNOSIS — R60.9 EDEMA, UNSPECIFIED TYPE: ICD-10-CM

## 2023-08-03 DIAGNOSIS — G89.29 CHRONIC BILATERAL LOW BACK PAIN WITH RIGHT-SIDED SCIATICA: ICD-10-CM

## 2023-08-03 PROCEDURE — 97112 NEUROMUSCULAR REEDUCATION: CPT

## 2023-08-03 PROCEDURE — 97110 THERAPEUTIC EXERCISES: CPT

## 2023-08-03 NOTE — PROGRESS NOTES
(x-rays)] and had a direct face to face with the patient discussing the diagnosis and importance of compliance with the treatment and plan. The treatment plan is listed in the above plan section of this Office encounter. I  answered all of her questions, as well as documenting patient care coordination for this individual on the day of the visit. Disclaimer:     Sections of this note are dictated using utilizing voice recognition software, which may have resulted in some phonetic based errors in grammar and contents. Even though attempts were made to correct all the mistakes, some may have been missed, and remained in the body of the document. If questions arise, please contact our department. An electronic signature was used to authenticate this note. Vladimir Sierra may have a reminder for a \"due or due soon\" health maintenance. I have asked that she contact her primary care provider for follow-up on this health maintenance.            Eduard Apley, MD  8/3/2023  12:04 PM

## 2023-08-03 NOTE — PROGRESS NOTES
PHYSICAL / OCCUPATIONAL THERAPY - DAILY TREATMENT NOTE (updated )    Patient Name: Janessa Helms    Date: 8/3/2023    : 1955  Insurance: Payor: MEDICARE / Plan: MEDICARE PART A AND B / Product Type: *No Product type* /      Patient  verified Yes     Visit #   Current / Total 3 12   Time   In / Out 1230 115   Pain   In / Out 10 9   Subjective Functional Status/Changes: Pt reports she is still in a lot of pain but she will try to get through therapy and do the best she can. Changes to: Allergies, Med Hx, Sx Hx?   no       TREATMENT AREA =  Achilles tendinitis, left leg [M76.62]  Pain in right ankle and joints of right foot [M25.571]    OBJECTIVE    Therapeutic Procedures: Tx Min Billable or 1:1 Min (if diff from Tx Min) Procedure, Rationale, Specifics   37  02829 Therapeutic Exercise (timed):  increase ROM, strength, coordination, balance, and proprioception to improve patient's ability to progress to PLOF and address remaining functional goals. (see flow sheet as applicable)     Details if applicable:       8  45593 Neuromuscular Re-Education (timed):  improve balance, coordination, kinesthetic sense, posture, core stability and proprioception to improve patient's ability to develop conscious control of individual muscles and awareness of position of extremities in order to progress to PLOF and address remaining functional goals.  (see flow sheet as applicable)     Details if applicable:            Details if applicable:            Details if applicable:            Details if applicable:     39  [facility] Totals Reminder: bill using total billable min of TIMED therapeutic procedures (example: do not include dry needle or estim unattended, both untimed codes, in totals to left)  8-22 min = 1 unit; 23-37 min = 2 units; 38-52 min = 3 units; 53-67 min = 4 units; 68-82 min = 5 units   Total Total     TOTAL TREATMENT TIME:        45     [x]  Patient Education billed concurrently with other procedures   [x]

## 2023-08-08 ENCOUNTER — HOSPITAL ENCOUNTER (OUTPATIENT)
Facility: HOSPITAL | Age: 68
Setting detail: RECURRING SERIES
Discharge: HOME | End: 2023-08-11
Payer: MEDICARE

## 2023-08-08 ENCOUNTER — HOSPITAL ENCOUNTER (OUTPATIENT)
Facility: HOSPITAL | Age: 68
Setting detail: RECURRING SERIES
End: 2023-08-08
Payer: MEDICARE

## 2023-08-08 PROCEDURE — 97112 NEUROMUSCULAR REEDUCATION: CPT

## 2023-08-08 PROCEDURE — 97110 THERAPEUTIC EXERCISES: CPT

## 2023-08-08 NOTE — PROGRESS NOTES
PHYSICAL / OCCUPATIONAL THERAPY - DAILY TREATMENT NOTE (updated )    Patient Name: Bouchra Zuleta    Date: 2023    : 1955  Insurance: Payor: MEDICARE / Plan: MEDICARE PART A AND B / Product Type: *No Product type* /      Patient  verified Yes     Visit #   Current / Total 4 12   Time   In / Out 11:10 11:53   Pain   In / Out 8.5 8   Subjective Functional Status/Changes: Patient stated that her ankles don't hurt as bad because she has been off work. Changes to: Allergies, Med Hx, Sx Hx?   no       TREATMENT AREA =  Pain in left leg [M79.605]  Pain in right ankle and joints of right foot [M25.571]    OBJECTIVE    Therapeutic Procedures: Tx Min Billable or 1:1 Min (if diff from Tx Min) Procedure, Rationale, Specifics   33 30 19818 Therapeutic Exercise (timed):  increase ROM, strength, coordination, balance, and proprioception to improve patient's ability to progress to PLOF and address remaining functional goals. (see flow sheet as applicable)     Details if applicable:       10 10 29432 Neuromuscular Re-Education (timed):  improve balance, coordination, kinesthetic sense, posture, core stability and proprioception to improve patient's ability to develop conscious control of individual muscles and awareness of position of extremities in order to progress to PLOF and address remaining functional goals.  (see flow sheet as applicable)     Details if applicable:            Details if applicable:            Details if applicable:            Details if applicable:     37 40 [facility] Totals Reminder: bill using total billable min of TIMED therapeutic procedures (example: do not include dry needle or estim unattended, both untimed codes, in totals to left)  8-22 min = 1 unit; 23-37 min = 2 units; 38-52 min = 3 units; 53-67 min = 4 units; 68-82 min = 5 units   Total Total     TOTAL TREATMENT TIME:        43     [x]  Patient Education billed concurrently with other procedures   [x] Review HEP    []

## 2023-08-10 ENCOUNTER — HOSPITAL ENCOUNTER (OUTPATIENT)
Facility: HOSPITAL | Age: 68
Setting detail: RECURRING SERIES
Discharge: HOME | End: 2023-08-13
Payer: MEDICARE

## 2023-08-10 PROCEDURE — 97530 THERAPEUTIC ACTIVITIES: CPT

## 2023-08-10 PROCEDURE — 97110 THERAPEUTIC EXERCISES: CPT

## 2023-08-14 ENCOUNTER — APPOINTMENT (OUTPATIENT)
Facility: HOSPITAL | Age: 68
End: 2023-08-14
Attending: STUDENT IN AN ORGANIZED HEALTH CARE EDUCATION/TRAINING PROGRAM
Payer: MEDICARE

## 2023-08-14 ENCOUNTER — APPOINTMENT (OUTPATIENT)
Facility: HOSPITAL | Age: 68
End: 2023-08-14
Payer: MEDICARE

## 2023-08-14 ENCOUNTER — HOSPITAL ENCOUNTER (EMERGENCY)
Facility: HOSPITAL | Age: 68
Discharge: HOME | End: 2023-08-14
Attending: STUDENT IN AN ORGANIZED HEALTH CARE EDUCATION/TRAINING PROGRAM
Payer: MEDICARE

## 2023-08-14 VITALS
HEART RATE: 91 BPM | WEIGHT: 198 LBS | DIASTOLIC BLOOD PRESSURE: 76 MMHG | TEMPERATURE: 98.8 F | HEIGHT: 66 IN | OXYGEN SATURATION: 100 % | SYSTOLIC BLOOD PRESSURE: 155 MMHG | BODY MASS INDEX: 31.82 KG/M2 | RESPIRATION RATE: 18 BRPM

## 2023-08-14 DIAGNOSIS — M19.90 ARTHRITIS: Primary | ICD-10-CM

## 2023-08-14 LAB — ECHO BSA: 2.04 M2

## 2023-08-14 PROCEDURE — 93970 EXTREMITY STUDY: CPT | Performed by: INTERNAL MEDICINE

## 2023-08-14 PROCEDURE — 96372 THER/PROPH/DIAG INJ SC/IM: CPT

## 2023-08-14 PROCEDURE — 99284 EMERGENCY DEPT VISIT MOD MDM: CPT

## 2023-08-14 PROCEDURE — 93970 EXTREMITY STUDY: CPT

## 2023-08-14 PROCEDURE — 73562 X-RAY EXAM OF KNEE 3: CPT

## 2023-08-14 PROCEDURE — 6360000002 HC RX W HCPCS: Performed by: STUDENT IN AN ORGANIZED HEALTH CARE EDUCATION/TRAINING PROGRAM

## 2023-08-14 RX ORDER — KETOROLAC TROMETHAMINE 15 MG/ML
15 INJECTION, SOLUTION INTRAMUSCULAR; INTRAVENOUS
Status: COMPLETED | OUTPATIENT
Start: 2023-08-14 | End: 2023-08-14

## 2023-08-14 RX ORDER — MELOXICAM 7.5 MG/1
7.5 TABLET ORAL DAILY PRN
Qty: 7 TABLET | Refills: 0 | Status: SHIPPED | OUTPATIENT
Start: 2023-08-14 | End: 2023-08-21

## 2023-08-14 RX ADMIN — KETOROLAC TROMETHAMINE 15 MG: 15 INJECTION, SOLUTION INTRAMUSCULAR; INTRAVENOUS at 08:13

## 2023-08-14 ASSESSMENT — PAIN SCALES - GENERAL
PAINLEVEL_OUTOF10: 10
PAINLEVEL_OUTOF10: 3

## 2023-08-14 ASSESSMENT — PAIN DESCRIPTION - ORIENTATION: ORIENTATION: RIGHT

## 2023-08-14 ASSESSMENT — PAIN - FUNCTIONAL ASSESSMENT: PAIN_FUNCTIONAL_ASSESSMENT: 0-10

## 2023-08-14 ASSESSMENT — PAIN DESCRIPTION - LOCATION: LOCATION: KNEE

## 2023-08-14 NOTE — ED PROVIDER NOTES
EMERGENCY DEPARTMENT HISTORY AND PHYSICAL EXAM    11:21 AM      Date: 8/14/2023  Patient Name: Oleg Marroquin    History of Presenting Illness     Chief Complaint   Patient presents with    Knee Pain       HPI:    80 yo female presents today with right knee pain that began yesterday with random onset. Patient also notes fall in the past month. Pain is described as sharp that radiates down the back of the right leg worse yesterday. Standing and activity makes the pain worse and rest relieves the pain. Tylenol and icing does not alleviate pain. Patient also notes pain on the left knee with history of tendinitis. Patient denies fever. PCP: Claudean Hamburg, APRN - NP    No current facility-administered medications for this encounter.      Current Outpatient Medications   Medication Sig Dispense Refill    meloxicam (MOBIC) 7.5 MG tablet Take 1 tablet by mouth daily as needed for Pain 7 tablet 0    hydroCHLOROthiazide (MICROZIDE) 12.5 MG capsule Take 1 capsule by mouth every morning 30 capsule 2    losartan (COZAAR) 50 MG tablet Take 1 tablet by mouth daily 30 tablet 5    diclofenac sodium (VOLTAREN) 1 % GEL Apply 2 g topically 4 times daily      diclofenac (VOLTAREN) 50 MG EC tablet Take 1 tablet by mouth 2 times daily as needed      famotidine (PEPCID) 40 MG tablet Take 1 tablet by mouth 2 times daily as needed      metFORMIN (GLUCOPHAGE-XR) 500 MG extended release tablet Take 1 tablet by mouth Daily with supper      triamcinolone (ARISTOCORT) 0.5 % cream Apply 2 g topically 2 times daily         Past History     Past Medical History:  Past Medical History:   Diagnosis Date    Essential hypertension, benign        Past Surgical History:  Past Surgical History:   Procedure Laterality Date    APPENDECTOMY      BREAST LUMPECTOMY Right        Family History:  Family History   Problem Relation Age of Onset    No Known Problems Mother     No Known Problems Father     No Known Problems Sister     No Known Problems

## 2023-08-14 NOTE — ED NOTES
Vascular tech called and asked to perform diagnostic studies per the MD's order.       Patience JUSTIN Rosario  08/14/23 9908

## 2023-08-14 NOTE — ED NOTES
Rn went over all discharge instructions with this patient, to include: follow up care, prescribed medications and when to return.       Teddy Bjawa RN  08/14/23 6894 Topical Keratolytics Recommendations: AmLactin, CeraVe SA lotion Detail Level: Zone

## 2023-08-14 NOTE — ED TRIAGE NOTES
A&O female with c/o right knee pain. Reports fall last month landing on both knees. Currently attending PT for tendonitis in both legs. Reports increased pain in right knee that began yesterday.

## 2023-08-14 NOTE — ED NOTES
Heat pack provided to right knee for pain. Patient given crackers and juice at this time.       JUSTIN Perkins  08/14/23 0813

## 2023-08-14 NOTE — DISCHARGE INSTRUCTIONS
Please follow up with your primary care doctor regarding your arthritis. Return to the emergency department for any new or worsening symptoms.

## 2023-08-15 ENCOUNTER — APPOINTMENT (OUTPATIENT)
Facility: HOSPITAL | Age: 68
End: 2023-08-15
Payer: MEDICARE

## 2023-08-15 DIAGNOSIS — I10 ESSENTIAL (PRIMARY) HYPERTENSION: ICD-10-CM

## 2023-08-15 NOTE — TELEPHONE ENCOUNTER
Last Visit: 08- OV   Next Appointment: 08-  Previous Refill Encounter: 03- #30 tabs with 5 refills      Requested Prescriptions     Pending Prescriptions Disp Refills    losartan (COZAAR) 50 MG tablet [Pharmacy Med Name: LOSARTAN POTASSIUM 50 MG TAB] 90 tablet 1     Sig: TAKE 1 TABLET BY MOUTH EVERY DAY

## 2023-08-17 ENCOUNTER — HOSPITAL ENCOUNTER (OUTPATIENT)
Facility: HOSPITAL | Age: 68
Setting detail: RECURRING SERIES
Discharge: HOME | End: 2023-08-20
Payer: MEDICARE

## 2023-08-17 PROCEDURE — 97110 THERAPEUTIC EXERCISES: CPT

## 2023-08-17 NOTE — PROGRESS NOTES
BS AMB   9/11/2023 12:30 PM [facility] RM 4 3D [facility] [place]   11/27/2023  8:40 AM Luis Dubois MD [facility] BS AMB   11/29/2023  9:00 AM [facility] BS AMB   12/6/2023  9:00 AM MARIBEL Hardy - NP [facility] BS AMB

## 2023-08-18 ENCOUNTER — TELEPHONE (OUTPATIENT)
Age: 68
End: 2023-08-18

## 2023-08-18 NOTE — TELEPHONE ENCOUNTER
Last appointment: 08/02/2023    Next appointment: 08/24/2023    Patient requesting refill for     losartan (COZAAR) 50 MG tablet      Pharmacy   [facility]/pharmacy [facility], [address] [place] [address] [address] Travis [phone]   [facility],[place] [address]   Phone:  [phone]  Fax:  [phone]      Patient says she is currently out of the medication

## 2023-08-22 ENCOUNTER — HOSPITAL ENCOUNTER (OUTPATIENT)
Facility: HOSPITAL | Age: 68
Setting detail: RECURRING SERIES
Discharge: HOME | End: 2023-08-25
Payer: MEDICARE

## 2023-08-22 PROCEDURE — 97110 THERAPEUTIC EXERCISES: CPT

## 2023-08-22 PROCEDURE — 97116 GAIT TRAINING THERAPY: CPT

## 2023-08-22 RX ORDER — LOSARTAN POTASSIUM 50 MG/1
TABLET ORAL
Qty: 90 TABLET | Refills: 3 | Status: SHIPPED | OUTPATIENT
Start: 2023-08-22

## 2023-08-22 NOTE — PROGRESS NOTES
PHYSICAL / OCCUPATIONAL THERAPY - DAILY TREATMENT NOTE (updated )    Patient Name: Elmo Redding    Date: 2023    : 1955  Insurance: Payor: MEDICARE / Plan: MEDICARE PART A AND B / Product Type: *No Product type* /      Patient  verified Yes     Visit #   Current / Total 7 12   Time   In / Out 7:50 8:28   Pain   In / Out 8 9   Subjective Functional Status/Changes: Feels a little better today because she didn't work yesterday. Agreeable to discharge today. Would like to learn how to use a cane. Changes to: Allergies, Med Hx, Sx Hx?   no       TREATMENT AREA =  Pain in left leg [M79.605]  Pain in right ankle and joints of right foot [M25.571]    OBJECTIVE    Therapeutic Procedures: Tx Min Billable or 1:1 Min (if diff from Tx Min) Procedure, Rationale, Specifics   8  63379 Gait Training (timed):    200 feet with straight cane (assistive device) over even surfaces with independent level of assist. Cuing for can sequencing. To improve safety and dynamic movement with household/community ambulation. (see flow sheet as applicable)     Details if applicable:       30  39152 Therapeutic Exercise (timed):  increase ROM, strength, coordination, balance, and proprioception to improve patient's ability to progress to PLOF and address remaining functional goals. (see flow sheet as applicable)     Details if applicable:     45  [facility] Totals Reminder: bill using total billable min of TIMED therapeutic procedures (example: do not include dry needle or estim unattended, both untimed codes, in totals to left)  8-22 min = 1 unit; 23-37 min = 2 units; 38-52 min = 3 units; 53-67 min = 4 units; 68-82 min = 5 units   Total Total     TOTAL TREATMENT TIME:        38     [x]  Patient Education billed concurrently with other procedures   [x] Review HEP    [] Progressed/Changed HEP, detail:    [] Other detail:       Objective Information/Functional Measures/Assessment    Patient with no progress towards goals.  Poor

## 2023-08-22 NOTE — PROGRESS NOTES
[facility]  [address] [place]  [facility]  [phone] fax    Physical Therapy Discharge Summary  Patient name: Valarie Hinojosa Start of Care: 2023   Referral source: Dede Funes MD : 1955               Medical Diagnosis: Achilles tendinitis, left leg [M76.62]  Pain in right ankle and joints of right foot [M25.571]        Onset Date:4/15/2023               Treatment Diagnosis: M25.571  RIGHT ANKLE PAIN and M25.572  LEFT ANKLE PAIN                                      Prior Hospitalization: see medical history Provider#: [id number]   Medications: Verified on Patient Summary List      Comorbidities: BMI >30, HTN, bilateral lower extremity lymphedema   Prior Level of Function: Chronic pain, no history of falls prior to 6 months ago. 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 Ambulates without device. Visits from Start of Care: 7    Missed Visits: 0    Reporting Period : 2023 to 2023    Goals/Measure of Progress:    Short Term Goals: To be accomplished in 2 weeks  Patient to be independent in HEP to maximize therapeutic effect of care plan. Evaluation: HEP initiated        DC: Unmet - partially compliant    Long Term Goals: To be accomplished in 6 weeks  Patient to improve jose guadalupe ankle DF to 8 deg for improved gait mechanics. Evaluation: right = -3 deg, left = 0 deg  DC: Unmet, regressed - (-)10 deg left, 0 deg right     Patient to maintain SLS >15 seconds bilaterally to demonstrate improved stability for safety in negotiating environment. Evaluation: right = 7 sec, left 5 sec        DC: Unmet - right = 9 sec, left = 5 sec    Patient to demonstrate bilateral heel raise x10 with <1-2 point increase in pain to indicate improved functional strength for ease of stair climbing.   Evaluation: x5 with 2-3 point increase in pain   DC:  Unmet - x6 with 3 point increase     Patient to improve FOTO score to 54 to indicate

## 2023-08-22 NOTE — PROGRESS NOTES
Physical Therapy Discharge Instructions      [facility]  [address] [place] [address] [facility]  [phone] fax    Patient: Alfred Medina  : 1955      Continue Home Exercise Program 1 times per day for 2 weeks, then decrease to 3-4 times per week      Continue with    [x] Ice  as needed      [x] Heat           Follow up with MD:     [] Upon completion of therapy     [x] As needed      Recommendations:     [x]   Return to activity with home program    []   Return to activity with the following modifications:       []Post Rehab Program    []Join Independent aquatic program     []Return to/join local [employer], PT 2023 8:00 AM

## 2023-08-24 ENCOUNTER — OFFICE VISIT (OUTPATIENT)
Age: 68
End: 2023-08-24
Payer: MEDICARE

## 2023-08-24 VITALS
TEMPERATURE: 98.9 F | BODY MASS INDEX: 31.57 KG/M2 | SYSTOLIC BLOOD PRESSURE: 151 MMHG | RESPIRATION RATE: 18 BRPM | HEIGHT: 66 IN | OXYGEN SATURATION: 97 % | WEIGHT: 196.4 LBS | DIASTOLIC BLOOD PRESSURE: 89 MMHG | HEART RATE: 91 BPM

## 2023-08-24 DIAGNOSIS — M25.561 ACUTE PAIN OF RIGHT KNEE: Primary | ICD-10-CM

## 2023-08-24 PROCEDURE — 99214 OFFICE O/P EST MOD 30 MIN: CPT | Performed by: NURSE PRACTITIONER

## 2023-08-24 PROCEDURE — 3017F COLORECTAL CA SCREEN DOC REV: CPT | Performed by: NURSE PRACTITIONER

## 2023-08-24 PROCEDURE — 3074F SYST BP LT 130 MM HG: CPT | Performed by: NURSE PRACTITIONER

## 2023-08-24 PROCEDURE — 3078F DIAST BP <80 MM HG: CPT | Performed by: NURSE PRACTITIONER

## 2023-08-24 PROCEDURE — 1090F PRES/ABSN URINE INCON ASSESS: CPT | Performed by: NURSE PRACTITIONER

## 2023-08-24 PROCEDURE — 1123F ACP DISCUSS/DSCN MKR DOCD: CPT | Performed by: NURSE PRACTITIONER

## 2023-08-24 PROCEDURE — G8427 DOCREV CUR MEDS BY ELIG CLIN: HCPCS | Performed by: NURSE PRACTITIONER

## 2023-08-24 PROCEDURE — G8400 PT W/DXA NO RESULTS DOC: HCPCS | Performed by: NURSE PRACTITIONER

## 2023-08-24 PROCEDURE — G8417 CALC BMI ABV UP PARAM F/U: HCPCS | Performed by: NURSE PRACTITIONER

## 2023-08-24 PROCEDURE — 1036F TOBACCO NON-USER: CPT | Performed by: NURSE PRACTITIONER

## 2023-08-24 RX ORDER — FAMOTIDINE 40 MG/1
40 TABLET, FILM COATED ORAL 2 TIMES DAILY PRN
Qty: 60 TABLET | Refills: 2 | Status: SHIPPED | OUTPATIENT
Start: 2023-08-24

## 2023-08-24 NOTE — PROGRESS NOTES
Holley Berry is a 79 y.o. female is a Established patient who was seen in clinic today alone (8/24/2023) for ED Follow-up (Seen at [facility] ER on 8/14/23 diagnosed with arthritis ) and Knee Pain    Assessment & Plan:   Saw Noriega was seen today for ed follow-up and knee pain. Diagnoses and all orders for this visit:    Acute pain of right knee  -     [facility] - Drew Flores DO, Orthopedic Surgery(General/Total Joint), [place] ([address])    Other orders  -     diclofenac (VOLTAREN) 50 MG EC tablet; Take 1 tablet by mouth 2 times daily as needed for Pain (take with food)  -     famotidine (PEPCID) 40 MG tablet; Take 1 tablet by mouth 2 times daily as needed (reflux symptoms)      Discontinue mobic and began diclofenac as needed for pain    I have discussed the diagnosis with the patient and the intended plan as seen in the above orders. The patient has received an after-visit summary and questions were answered concerning future plans. I have discussed medication side effects and warnings with the patient as well. Patient agreeable with above plan and verbalizes understanding. Return if symptoms worsen or fail to improve, for keep previously scheduled appt. Subjective:   Patient states she is having trouble with her mobility/balance. States yesterday she fell on her knees while trying to get clothes out of the dryer. States she also has been referred to neurology by orthopedist Dr. Marcie Dooley because when she walks she is walking to the left. Patient states she began to have right knee pain after falling at work. Patient was prescribed meloxicam to take as needed for pain, patient reports this has not been helpful. [facility] ED 8/14/2023  Diagnosis      Clinical Impression:   1. Arthritis      History of Presenting Illness          Chief Complaint   Patient presents with    Knee Pain      HPI:     80 yo female presents today with right knee pain that began yesterday with random onset.

## 2023-08-24 NOTE — PROGRESS NOTES
1. \"Have you been to the ER, urgent care clinic since your last visit? Hospitalized since your last visit? \"  [facility] ER     2. \"Have you seen or consulted any other health care providers outside of the [address] [place] [address] since your last visit? \" Yes When: 8/3/23 Where: Dr. Lenora Ulrich Reason for visit: routine visit      3. For patients aged 43-73: Has the patient had a colonoscopy / FIT/ Cologuard? No      If the patient is female:    4. For patients aged 43-66: Has the patient had a mammogram within the past 2 years? No      5. For patients aged 21-65: Has the patient had a pap smear?  No

## 2023-09-11 ENCOUNTER — HOSPITAL ENCOUNTER (OUTPATIENT)
Facility: HOSPITAL | Age: 68
Discharge: HOME | End: 2023-09-14
Payer: MEDICARE

## 2023-09-11 VITALS — WEIGHT: 196 LBS | BODY MASS INDEX: 31.5 KG/M2 | HEIGHT: 66 IN

## 2023-09-11 DIAGNOSIS — Z12.31 ENCOUNTER FOR SCREENING MAMMOGRAM FOR MALIGNANT NEOPLASM OF BREAST: ICD-10-CM

## 2023-09-11 PROCEDURE — 77063 BREAST TOMOSYNTHESIS BI: CPT

## 2023-09-13 ASSESSMENT — ENCOUNTER SYMPTOMS
CHEST TIGHTNESS: 0
SHORTNESS OF BREATH: 0

## 2023-10-09 ENCOUNTER — OFFICE VISIT (OUTPATIENT)
Age: 68
End: 2023-10-09
Payer: MEDICARE

## 2023-10-09 VITALS — BODY MASS INDEX: 31.64 KG/M2 | HEIGHT: 66 IN | HEART RATE: 76 BPM | TEMPERATURE: 97.9 F | OXYGEN SATURATION: 98 %

## 2023-10-09 DIAGNOSIS — M54.16 LUMBAR RADICULOPATHY: ICD-10-CM

## 2023-10-09 DIAGNOSIS — M54.2 NECK PAIN: ICD-10-CM

## 2023-10-09 DIAGNOSIS — M47.816 LUMBAR SPONDYLOSIS: Primary | ICD-10-CM

## 2023-10-09 PROCEDURE — 1090F PRES/ABSN URINE INCON ASSESS: CPT | Performed by: NURSE PRACTITIONER

## 2023-10-09 PROCEDURE — 3017F COLORECTAL CA SCREEN DOC REV: CPT | Performed by: NURSE PRACTITIONER

## 2023-10-09 PROCEDURE — G8427 DOCREV CUR MEDS BY ELIG CLIN: HCPCS | Performed by: NURSE PRACTITIONER

## 2023-10-09 PROCEDURE — G8400 PT W/DXA NO RESULTS DOC: HCPCS | Performed by: NURSE PRACTITIONER

## 2023-10-09 PROCEDURE — 1036F TOBACCO NON-USER: CPT | Performed by: NURSE PRACTITIONER

## 2023-10-09 PROCEDURE — 99204 OFFICE O/P NEW MOD 45 MIN: CPT | Performed by: NURSE PRACTITIONER

## 2023-10-09 PROCEDURE — G8484 FLU IMMUNIZE NO ADMIN: HCPCS | Performed by: NURSE PRACTITIONER

## 2023-10-09 PROCEDURE — 72040 X-RAY EXAM NECK SPINE 2-3 VW: CPT | Performed by: NURSE PRACTITIONER

## 2023-10-09 PROCEDURE — 1123F ACP DISCUSS/DSCN MKR DOCD: CPT | Performed by: NURSE PRACTITIONER

## 2023-10-09 PROCEDURE — G8417 CALC BMI ABV UP PARAM F/U: HCPCS | Performed by: NURSE PRACTITIONER

## 2023-10-09 RX ORDER — DICLOFENAC SODIUM 75 MG/1
75 TABLET, DELAYED RELEASE ORAL 2 TIMES DAILY
Qty: 60 TABLET | Refills: 5 | Status: SHIPPED | OUTPATIENT
Start: 2023-10-09

## 2023-10-09 NOTE — PROGRESS NOTES
Liban Rocha presents today for   Chief Complaint   Patient presents with    Back Pain    Leg Pain     Bilateral         Is someone accompanying this pt? o    Is the patient using any DME equipment during OV? Yes, pt unsteady and needed to use the office wheelchair      Coordination of Care:  1. Have you been to the ER, urgent care clinic since your last visit? Yes, ER visit in August for arthritis  Hospitalized since your last visit? no    2. Have you seen or consulted any other health care providers outside of the [address] [place] [address] Avenue since your last visit? Yes, pcp and ortho Include any pap smears or colon screening.  no
facility-administered medications for this visit. Review of systems:    Past Medical History:   Diagnosis Date    Essential hypertension, benign      Past Surgical History:   Procedure Laterality Date    APPENDECTOMY      BREAST LUMPECTOMY Right      Social History     Socioeconomic History    Marital status:      Spouse name: Not on file    Number of children: Not on file    Years of education: Not on file    Highest education level: Not on file   Occupational History    Not on file   Tobacco Use    Smoking status: Never     Passive exposure: Never    Smokeless tobacco: Never   Vaping Use    Vaping Use: Never used   Substance and Sexual Activity    Alcohol use: No    Drug use: Never    Sexual activity: Yes   Other Topics Concern    Not on file   Social History Narrative    Not on file     Social Determinants of Health     Financial Resource Strain: Medium Risk (3/3/2023)    Overall Financial Resource Strain (CARDIA)     Difficulty of Paying Living Expenses: Somewhat hard   Food Insecurity: No Food Insecurity (3/3/2023)    Hunger Vital Sign     Worried About Running Out of Food in the Last Year: Never true     Ran Out of Food in the Last Year: Never true   Transportation Needs: Unmet Transportation Needs (3/3/2023)    PRAPARE - Transportation     Lack of Transportation (Medical):  Not on file     Lack of Transportation (Non-Medical): Yes   Physical Activity: Inactive (6/19/2023)    Exercise Vital Sign     Days of Exercise per Week: 0 days     Minutes of Exercise per Session: 0 min   Stress: Not on file   Social Connections: Not on file   Intimate Partner Violence: Not on file   Housing Stability: High Risk (3/3/2023)    Housing Stability Vital Sign     Unable to Pay for Housing in the Last Year: Not on file     Number of [address] in the Last Year: Not on file     Unstable Housing in the Last Year: Yes     Family History   Problem Relation Age of Onset    No Known Problems Mother     No Known

## 2023-10-18 ENCOUNTER — OFFICE VISIT (OUTPATIENT)
Age: 68
End: 2023-10-18
Payer: MEDICARE

## 2023-10-18 ENCOUNTER — HOSPITAL ENCOUNTER (OUTPATIENT)
Facility: HOSPITAL | Age: 68
Setting detail: RECURRING SERIES
Discharge: HOME | End: 2023-10-21
Payer: MEDICARE

## 2023-10-18 VITALS
BODY MASS INDEX: 31.5 KG/M2 | WEIGHT: 196 LBS | OXYGEN SATURATION: 99 % | HEART RATE: 84 BPM | TEMPERATURE: 97 F | HEIGHT: 66 IN

## 2023-10-18 DIAGNOSIS — M47.816 LUMBAR SPONDYLOSIS: Primary | ICD-10-CM

## 2023-10-18 DIAGNOSIS — M54.16 LUMBAR RADICULOPATHY: ICD-10-CM

## 2023-10-18 DIAGNOSIS — M54.2 NECK PAIN: ICD-10-CM

## 2023-10-18 PROCEDURE — 3017F COLORECTAL CA SCREEN DOC REV: CPT | Performed by: NURSE PRACTITIONER

## 2023-10-18 PROCEDURE — G8400 PT W/DXA NO RESULTS DOC: HCPCS | Performed by: NURSE PRACTITIONER

## 2023-10-18 PROCEDURE — 1090F PRES/ABSN URINE INCON ASSESS: CPT | Performed by: NURSE PRACTITIONER

## 2023-10-18 PROCEDURE — 1036F TOBACCO NON-USER: CPT | Performed by: NURSE PRACTITIONER

## 2023-10-18 PROCEDURE — 97162 PT EVAL MOD COMPLEX 30 MIN: CPT

## 2023-10-18 PROCEDURE — 99213 OFFICE O/P EST LOW 20 MIN: CPT | Performed by: NURSE PRACTITIONER

## 2023-10-18 PROCEDURE — 97110 THERAPEUTIC EXERCISES: CPT

## 2023-10-18 PROCEDURE — 97535 SELF CARE MNGMENT TRAINING: CPT

## 2023-10-18 PROCEDURE — G8417 CALC BMI ABV UP PARAM F/U: HCPCS | Performed by: NURSE PRACTITIONER

## 2023-10-18 PROCEDURE — 1123F ACP DISCUSS/DSCN MKR DOCD: CPT | Performed by: NURSE PRACTITIONER

## 2023-10-18 PROCEDURE — G8484 FLU IMMUNIZE NO ADMIN: HCPCS | Performed by: NURSE PRACTITIONER

## 2023-10-18 PROCEDURE — G8427 DOCREV CUR MEDS BY ELIG CLIN: HCPCS | Performed by: NURSE PRACTITIONER

## 2023-10-18 NOTE — PROGRESS NOTES
Marquise Keysha presents today for   Chief Complaint   Patient presents with    Lower Back Pain       Is someone accompanying this pt? no    Is the patient using any DME equipment during OV? Yes, cane    Coordination of Care:  1. Have you been to the ER, urgent care clinic since your last visit? no  Hospitalized since your last visit? no    2. Have you seen or consulted any other health care providers outside of the [address] [place] [address] since your last visit? Yes, multiple  Include any pap smears or colon screening.  no

## 2023-10-18 NOTE — PROGRESS NOTES
[address] [facility] [facility] [address] [facility] RM:[phone] Fx: [phone]    PLAN OF CARE/ Statement of Necessity for Physical Therapy Services           Patient name: Bandar Vazquez Start of Care: 10/18/2023   Referral source: MARIBEL Coronado - * : 1955    Medical Diagnosis: Other low back pain [M54.59]       Onset Date: 2023   Treatment Diagnosis: M54.59  OTHER LOWER BACK PAIN                                     Prior Hospitalization: see medical history Provider#: [id number]   Medications: Verified on Patient Summary List     Comorbidities: Arthritis, back pain, BMI over 30, HTN   Prior Level of Function: Patient stated that  prior to 2023 she was not using an AD with ambulation. The Plan of Care and following information is based on the information from the initial evaluation. Assessment / key information:  Patient presents with LBP that began approximately 2023 with an insidious onset. Patient describes LBP as intermittent sharp/shooting and aching pain. Pain is predominately located in the middle of the low back. Patient denies numbness/tingling and no dysfunction with bowel or bladder. Patient stated she is walking with a SPC now. Patient has increased difficulty with sit to stand transfers, lifting, dressing, walking, and standing. Patient stated she has experienced a couple falls a couple months ago and attributes it to a lack of mobility. Patient denies LOC. Patient exhibits increased difficulty with sit to stand transfers, decreased trunk and (B) Hip mobility, decreased (B) LE strength, decreased Core stability, increased difficulty with bed mobility, decreased (B) LE flexibility, increased tightness at (B) lumbar paraspinals, and impaired gait. Patient demonstrates potential to make functional gains within a reasonable time frame.  Patient would benefit from skilled PT to address above deficits
per flow sheet       []  Other:_      Doreen Acosta, PT 10/18/2023  8:39 AM

## 2023-10-18 NOTE — PROGRESS NOTES
Chief complaint   Chief Complaint   Patient presents with    Lower Back Pain       History of Present Illness:  Tye Collazo is a  79 y.o.  female who comes in today to discuss her job and work limitations. She was last seen by me on October 9, 2023. She has back pain and leg pain and also some neck pain. We put her in physical therapy as she is she just went to her first session today. She states it did flareup her pain. 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 She has to do a lot of heavy work such as mopping the floor and getting residence out of bed. She would like a note with some restrictions until she gets better. She does state that diclofenac 75 mg that we increased her to last visit does seem to help some. Physical Exam: The patient is a 59-year-old female well-developed well-nourished who is alert and oriented with a normal mood and affect. She has 4 out of 5 strength bilateral upper and lower extremities. Negative straight leg raise. Negative Nataliia's. She has some pain with hyperextension of her spine. She does ambulate with a full weightbearing slow but nonantalgic gait utilizing a single-point cane. Assessment and Plan: This is a patient who has lumbar spondylosis and cervical spine spondylosis. I will give her restrictions of being able to sit stand and walk that well. No bending and twisting at the waist.  And no lifting greater than 20 pounds. She will continue her diclofenac 75 and does not need a refill at this time. We will see her back at her next appointment. Troy Adams was seen today for lower back pain. Diagnoses and all orders for this visit:    Lumbar spondylosis    Lumbar radiculopathy    Neck pain        No follow-ups on file.  has been . reviewed and is appropriate    Medications:  Current Outpatient Medications   Medication Sig Dispense Refill    diclofenac (VOLTAREN) 75 MG EC tablet Take 1

## 2023-10-23 ENCOUNTER — APPOINTMENT (OUTPATIENT)
Facility: HOSPITAL | Age: 68
End: 2023-10-23
Payer: MEDICARE

## 2023-10-25 ENCOUNTER — HOSPITAL ENCOUNTER (OUTPATIENT)
Facility: HOSPITAL | Age: 68
Setting detail: RECURRING SERIES
Discharge: HOME | End: 2023-10-28
Payer: MEDICARE

## 2023-10-25 PROCEDURE — 97110 THERAPEUTIC EXERCISES: CPT

## 2023-10-25 PROCEDURE — 97535 SELF CARE MNGMENT TRAINING: CPT

## 2023-10-25 NOTE — PROGRESS NOTES
PHYSICAL / OCCUPATIONAL THERAPY - DAILY TREATMENT NOTE (updated )    Patient Name: June Collins    Date: 10/25/2023    : 1955  Insurance: Payor: Madie Nighat / Plan: MEDICARE PART A AND B / Product Type: *No Product type* /      Patient  verified Yes     Visit #   Current / Total 2 24   Time   In / Out 9:47 10:13   Pain   In / Out 9.5 9.5   Subjective Functional Status/Changes: Pt states that when she was at [facility] she almost feel and had to lift herself into her truck which cause pain down her left leg. The next day, Tuesday,  she nearly fell backwards but was able to catch herself. Then on Wednesday she had another near fall at work. \"My mobility is getting really bad\"   Changes to: Allergies, Med Hx, Sx Hx?   no       TREATMENT AREA =  Other low back pain [M54.59]    OBJECTIVE    Therapeutic Procedures: Tx Min Billable or 1:1 Min (if diff from Tx Min) Procedure, Rationale, Specifics   8  74914 Therapeutic Exercise (timed):  increase ROM, strength, coordination, balance, and proprioception to improve patient's ability to progress to PLOF and address remaining functional goals. (see flow sheet as applicable)    Details if applicable:       18  06529 Self Care/Home Management (timed):  improve patient knowledge and understanding of pain reducing techniques, positioning, posture/ergonomics, home safety, activity modification, diagnosis/prognosis, and physical therapy expectations, procedures and progression  to improve patient's ability to progress to PLOF and address remaining functional goals.   (see flow sheet as applicable)     Details if applicable:     32  [facility] Totals Reminder: bill using total billable min of TIMED therapeutic procedures (example: do not include dry needle or estim unattended, both untimed codes, in totals to left)  8-22 min = 1 unit; 23-37 min = 2 units; 38-52 min = 3 units; 53-67 min = 4 units; 68-82 min = 5 units   Total Total     TOTAL TREATMENT TIME:        26     [x]

## 2023-10-30 ENCOUNTER — HOSPITAL ENCOUNTER (OUTPATIENT)
Facility: HOSPITAL | Age: 68
Setting detail: RECURRING SERIES
Discharge: HOME | End: 2023-11-02
Payer: MEDICARE

## 2023-10-30 PROCEDURE — 97112 NEUROMUSCULAR REEDUCATION: CPT

## 2023-10-30 PROCEDURE — 97110 THERAPEUTIC EXERCISES: CPT

## 2023-10-30 NOTE — PROGRESS NOTES
PHYSICAL / OCCUPATIONAL THERAPY - DAILY TREATMENT NOTE (updated )    Patient Name: Dirk Seen    Date: 10/30/2023    : 1955  Insurance: Payor: MEDICARE / Plan: MEDICARE PART A AND B / Product Type: *No Product type* /      Patient  verified Yes     Visit #   Current / Total 3 24   Time   In / Out 9:55 10:41   Pain   In / Out 8 8   Subjective Functional Status/Changes: Patient requested therapist note that she had a fall this past Saturday at 6:10am when she turned and both knees buckled and she fell to the floor. Patient stated her son assisted her off the floor. Patient stated she did not go the hospital. Patient denies an injury, just soreness. Changes to: Allergies, Med Hx, Sx Hx?   no       TREATMENT AREA =  Other low back pain [M54.59]    OBJECTIVE    Therapeutic Procedures: Tx Min Billable or 1:1 Min (if diff from Tx Min) Procedure, Rationale, Specifics   36 [phone] [id number] Therapeutic Exercise (timed):  increase ROM, strength, coordination, balance, and proprioception to improve patient's ability to progress to PLOF and address remaining functional goals. (see flow sheet as applicable)    Details if applicable:       10 10 28830 Neuromuscular Re-Education (timed):  improve balance, coordination, kinesthetic sense, posture, core stability and proprioception to improve patient's ability to develop conscious control of individual muscles and awareness of position of extremities in order to progress to PLOF and address remaining functional goals.  (see flow sheet as applicable)    Details if applicable:  scap retract to focus on posture-6x, seated TA activation: 6x          Details if applicable:           Details if applicable:            Details if applicable:     55 43 [facility] Totals Reminder: bill using total billable min of TIMED therapeutic procedures (example: do not include dry needle or estim unattended, both untimed codes, in totals to left)  8-22 min = 1 unit; 23-37 min = 2 units; 38-52

## 2023-11-01 ENCOUNTER — HOSPITAL ENCOUNTER (OUTPATIENT)
Facility: HOSPITAL | Age: 68
Setting detail: RECURRING SERIES
Discharge: HOME | End: 2023-11-04
Payer: MEDICARE

## 2023-11-01 PROCEDURE — 97110 THERAPEUTIC EXERCISES: CPT

## 2023-11-01 PROCEDURE — 97535 SELF CARE MNGMENT TRAINING: CPT

## 2023-11-01 NOTE — PROGRESS NOTES
PHYSICAL / OCCUPATIONAL THERAPY - DAILY TREATMENT NOTE (updated )    Patient Name: Monae Callaway    Date: 2023    : 1955  Insurance: Payor: MEDICARE / Plan: MEDICARE PART A AND B / Product Type: *No Product type* /      Patient  verified Yes     Visit #   Current / Total 4 24   Time   In / Out 839 912   Pain   In / Out 8.5 8.5   Subjective Functional Status/Changes: Pt reports she was at a [employer] gathering at [employer] and both her knees buckled on her and she almost fell. Pt states her low back is also in a lot of pain today as well. Changes to: Allergies, Med Hx, Sx Hx?   no       TREATMENT AREA =  Other low back pain [M54.59]    OBJECTIVE    Therapeutic Procedures: Tx Min Billable or 1:1 Min (if diff from Tx Min) Procedure, Rationale, Specifics   23  13520 Therapeutic Exercise (timed):  increase ROM, strength, coordination, balance, and proprioception to improve patient's ability to progress to PLOF and address remaining functional goals. (see flow sheet as applicable)    Details if applicable:       10  11992 Self Care/Home Management (timed):  improve patient knowledge and understanding of pain reducing techniques, positioning, posture/ergonomics, home safety, and activity modification  to improve patient's ability to progress to PLOF and address remaining functional goals. (see flow sheet as applicable)    Details if applicable:  Core engagement with transfers at home, safety measures to decrease falls at home.           Details if applicable:           Details if applicable:            Details if applicable:     35  [facility] Totals Reminder: bill using total billable min of TIMED therapeutic procedures (example: do not include dry needle or estim unattended, both untimed codes, in totals to left)  8-22 min = 1 unit; 23-37 min = 2 units; 38-52 min = 3 units; 53-67 min = 4 units; 68-82 min = 5 units   Total Total     TOTAL TREATMENT TIME:        33     [x]  Patient Education billed

## 2023-11-06 ENCOUNTER — HOSPITAL ENCOUNTER (OUTPATIENT)
Facility: HOSPITAL | Age: 68
Setting detail: RECURRING SERIES
Discharge: HOME | End: 2023-11-09
Payer: MEDICARE

## 2023-11-06 PROCEDURE — 97110 THERAPEUTIC EXERCISES: CPT

## 2023-11-06 NOTE — PROGRESS NOTES
Specifics   15  17391 Therapeutic Exercise (timed):  increase ROM, strength, coordination, balance, and proprioception to improve patient's ability to progress to PLOF and address remaining functional goals. (see flow sheet as applicable)    Details if applicable:             Details if applicable:            Details if applicable:           Details if applicable:            Details if applicable:     13  [facility] Totals Reminder: bill using total billable min of TIMED therapeutic procedures (example: do not include dry needle or estim unattended, both untimed codes, in totals to left)  8-22 min = 1 unit; 23-37 min = 2 units; 38-52 min = 3 units; 53-67 min = 4 units; 68-82 min = 5 units   Total Total     TOTAL TREATMENT TIME:        27     [x]  Patient Education billed concurrently with other procedures   [x] Review HEP    [] Progressed/Changed HEP, detail:    [] Other detail:       Objective Information/Functional Measures/Assessment    Attempted bike but pt reported pain getting worse and stopped. Initiated with SB roll out in sitting pt tolerated only 7 reps and stopped stating \"I cannot tolerate any more\". Attempted supine exercises, pt needed assistance with legs to attain sitting >supine and reported pain is worse and she wants to stop exercises. Pt was educated to follow up with her doctor to discuss further diagnostic tests and referral to a specialist. Applied MHP in sitting to decrease pain and stiffness. Patient will continue to benefit from skilled PT / OT services to modify and progress therapeutic interventions, analyze and address functional mobility deficits, analyze and address ROM deficits, analyze and address strength deficits, analyze and address soft tissue restrictions, analyze and cue for proper movement patterns, analyze and modify for postural abnormalities, and instruct in home and community integration to address functional deficits and attain remaining goals.     Progress toward goals /

## 2023-11-08 ENCOUNTER — HOSPITAL ENCOUNTER (OUTPATIENT)
Facility: HOSPITAL | Age: 68
Setting detail: RECURRING SERIES
Discharge: HOME | End: 2023-11-11
Payer: MEDICARE

## 2023-11-08 PROCEDURE — 97530 THERAPEUTIC ACTIVITIES: CPT

## 2023-11-08 PROCEDURE — 97110 THERAPEUTIC EXERCISES: CPT

## 2023-11-08 NOTE — PROGRESS NOTES
PHYSICAL / OCCUPATIONAL THERAPY - DAILY TREATMENT NOTE (updated )    Patient Name: Nando Peña    Date: 2023    : 1955  Insurance: Payor: MEDICARE / Plan: MEDICARE PART A AND B / Product Type: *No Product type* /      Patient  verified Yes     Visit #   Current / Total 6 24   Time   In / Out 832 924   Pain   In / Out 7.5 4   Subjective Functional Status/Changes: Pt reports she was at her friends house yesterday and she had a terrible time trying to stand up from a chair. Pt states she had to have help from her friend to stand. Changes to: Allergies, Med Hx, Sx Hx?   no       TREATMENT AREA =  Other low back pain [M54.59]    OBJECTIVE    Modalities Rationale:     decrease pain and increase tissue extensibility to improve patient's ability to progress to PLOF and address remaining functional goals. min [] Estim Unattended, type/location:                                      []  w/ice    []  w/heat    min [] Estim Attended, type/location:                                     []  w/US     []  w/ice    []  w/heat    []  TENS insruct      min []  Mechanical Traction: type/lbs                   []  pro   []  sup   []  int   []  cont    []  before manual    []  after manual    min []  Ultrasound, settings/location:      min []  Iontophoresis w/ dexamethasone, location:                                               []  take home patch       []  in clinic     10   min  unbilled []  Ice     [x]  Heat    location/position: MHP on LS area in supine    min []  Paraffin,  details:     min []  Vasopneumatic Device, press/temp:     min []  Idalmis Urias / Tammy Juarez:     If using vaso (only need to measure limb vaso being performed on)      pre-treatment girth :       post-treatment girth :       measured at (landmark location) :      min []  Other:    Skin assessment post-treatment (if applicable):    [x]  intact    [x]  redness- no adverse reaction                 []redness - adverse reaction:

## 2023-11-10 ENCOUNTER — HOSPITAL ENCOUNTER (EMERGENCY)
Facility: HOSPITAL | Age: 68
Discharge: HOME | End: 2023-11-10
Attending: EMERGENCY MEDICINE
Payer: MEDICARE

## 2023-11-10 VITALS
BODY MASS INDEX: 32.14 KG/M2 | RESPIRATION RATE: 18 BRPM | DIASTOLIC BLOOD PRESSURE: 94 MMHG | HEART RATE: 88 BPM | TEMPERATURE: 98.7 F | OXYGEN SATURATION: 99 % | SYSTOLIC BLOOD PRESSURE: 160 MMHG | WEIGHT: 200 LBS | HEIGHT: 66 IN

## 2023-11-10 DIAGNOSIS — N64.4 BREAST PAIN: Primary | ICD-10-CM

## 2023-11-10 LAB
ANION GAP SERPL CALC-SCNC: 8 MMOL/L (ref 3–18)
BASOPHILS # BLD: 0.1 K/UL (ref 0–0.1)
BASOPHILS NFR BLD: 1 % (ref 0–2)
BUN SERPL-MCNC: 22 MG/DL (ref 7–18)
BUN/CREAT SERPL: 27 (ref 12–20)
CALCIUM SERPL-MCNC: 9.4 MG/DL (ref 8.5–10.1)
CHLORIDE SERPL-SCNC: 107 MMOL/L (ref 100–111)
CO2 SERPL-SCNC: 27 MMOL/L (ref 21–32)
CREAT SERPL-MCNC: 0.83 MG/DL (ref 0.6–1.3)
DIFFERENTIAL METHOD BLD: NORMAL
EKG ATRIAL RATE: 100 BPM
EKG DIAGNOSIS: NORMAL
EKG P AXIS: 50 DEGREES
EKG P-R INTERVAL: 152 MS
EKG Q-T INTERVAL: 374 MS
EKG QRS DURATION: 88 MS
EKG QTC CALCULATION (BAZETT): 482 MS
EKG R AXIS: -32 DEGREES
EKG T AXIS: 59 DEGREES
EKG VENTRICULAR RATE: 100 BPM
EOSINOPHIL # BLD: 0.2 K/UL (ref 0–0.4)
EOSINOPHIL NFR BLD: 2 % (ref 0–5)
ERYTHROCYTE [DISTWIDTH] IN BLOOD BY AUTOMATED COUNT: 14.2 % (ref 11.6–14.5)
GLUCOSE SERPL-MCNC: 129 MG/DL (ref 74–99)
HCT VFR BLD AUTO: 43.3 % (ref 35–45)
HGB BLD-MCNC: 13.9 G/DL (ref 12–16)
IMM GRANULOCYTES # BLD AUTO: 0 K/UL (ref 0–0.04)
IMM GRANULOCYTES NFR BLD AUTO: 0 % (ref 0–0.5)
LYMPHOCYTES # BLD: 3.3 K/UL (ref 0.9–3.6)
LYMPHOCYTES NFR BLD: 34 % (ref 21–52)
MAGNESIUM SERPL-MCNC: 2 MG/DL (ref 1.6–2.6)
MCH RBC QN AUTO: 26.3 PG (ref 24–34)
MCHC RBC AUTO-ENTMCNC: 32.1 G/DL (ref 31–37)
MCV RBC AUTO: 82 FL (ref 78–100)
MONOCYTES # BLD: 0.4 K/UL (ref 0.05–1.2)
MONOCYTES NFR BLD: 4 % (ref 3–10)
NEUTS SEG # BLD: 5.9 K/UL (ref 1.8–8)
NEUTS SEG NFR BLD: 60 % (ref 40–73)
NRBC # BLD: 0 K/UL (ref 0–0.01)
NRBC BLD-RTO: 0 PER 100 WBC
PLATELET # BLD AUTO: 377 K/UL (ref 135–420)
PMV BLD AUTO: 11 FL (ref 9.2–11.8)
POTASSIUM SERPL-SCNC: 3.9 MMOL/L (ref 3.5–5.5)
RBC # BLD AUTO: 5.28 M/UL (ref 4.2–5.3)
SODIUM SERPL-SCNC: 142 MMOL/L (ref 136–145)
TROPONIN I SERPL HS-MCNC: 32 NG/L (ref 0–54)
WBC # BLD AUTO: 9.7 K/UL (ref 4.6–13.2)

## 2023-11-10 PROCEDURE — 84484 ASSAY OF TROPONIN QUANT: CPT

## 2023-11-10 PROCEDURE — 83735 ASSAY OF MAGNESIUM: CPT

## 2023-11-10 PROCEDURE — 93010 ELECTROCARDIOGRAM REPORT: CPT | Performed by: INTERNAL MEDICINE

## 2023-11-10 PROCEDURE — 80048 BASIC METABOLIC PNL TOTAL CA: CPT

## 2023-11-10 PROCEDURE — 6370000000 HC RX 637 (ALT 250 FOR IP): Performed by: EMERGENCY MEDICINE

## 2023-11-10 PROCEDURE — 99284 EMERGENCY DEPT VISIT MOD MDM: CPT

## 2023-11-10 PROCEDURE — 85025 COMPLETE CBC W/AUTO DIFF WBC: CPT

## 2023-11-10 PROCEDURE — 93005 ELECTROCARDIOGRAM TRACING: CPT | Performed by: EMERGENCY MEDICINE

## 2023-11-10 RX ORDER — FAMOTIDINE 20 MG/1
20 TABLET, FILM COATED ORAL
Status: COMPLETED | OUTPATIENT
Start: 2023-11-10 | End: 2023-11-10

## 2023-11-10 RX ADMIN — LIDOCAINE HYDROCHLORIDE 40 ML: 20 SOLUTION ORAL; TOPICAL at 08:01

## 2023-11-10 RX ADMIN — FAMOTIDINE 20 MG: 20 TABLET, FILM COATED ORAL at 08:01

## 2023-11-10 ASSESSMENT — PAIN - FUNCTIONAL ASSESSMENT: PAIN_FUNCTIONAL_ASSESSMENT: NONE - DENIES PAIN

## 2023-11-10 NOTE — ED NOTES
Patient brought to ED 4 via wheelchair. I have introduced myself to the patient and discussed anticipated plan of care. Patient resting quietly on stretcher in low and locked position. Call bell in reach. Side rail up x1. Patient on cardiac monitor and continuous pulse oximetry.       Jessie Benites RN  11/10/23 8075

## 2023-11-13 ENCOUNTER — HOSPITAL ENCOUNTER (OUTPATIENT)
Facility: HOSPITAL | Age: 68
Setting detail: RECURRING SERIES
End: 2023-11-13
Payer: MEDICARE

## 2023-11-15 ENCOUNTER — HOSPITAL ENCOUNTER (OUTPATIENT)
Facility: HOSPITAL | Age: 68
Setting detail: RECURRING SERIES
Discharge: HOME | End: 2023-11-18
Payer: MEDICARE

## 2023-11-15 PROCEDURE — 97535 SELF CARE MNGMENT TRAINING: CPT

## 2023-11-15 PROCEDURE — 97110 THERAPEUTIC EXERCISES: CPT

## 2023-11-15 NOTE — PROGRESS NOTES
[facility]  [address] [address] [place] [address]  [facility]  [phone] fax    Physical Therapy Discharge Summary  Patient name: García Gutierrez Start of Care:  10/18/2023   Referral source: MARIBEL Betancourt - * : 1955   Medical/Treatment Diagnosis: Other low back pain [M54.59]  Payor: Yeison Amin / Plan: MEDICARE PART A AND B / Product Type: *No Product type* /  Onset Date:2023     Prior Hospitalization: see medical history Provider#: [id number]   Medications: Verified on Patient Summary List    Comorbidities: Arthritis, back pain, BMI over 30, HTN   Prior Level of Function: Patient stated that  prior to 2023 she was not using an AD with ambulation. Visits from Start of Care: 7    Missed Visits: 1    Reporting Period : 10/18/23 to 11/15/23    Goals/Measure of Progress:  Alex Donovan will be independent with HEP to increase ease with ADLs. Eval: HEP established   DC: Progressing, pt states she can only do some of the exercises   2. Patient will be able to perform 5 supine bridges with pain no more then 5/10 to increase ease with bed mobility. Eval: Patient reports 10/10 pain with supine bridges. DC: no change unable, 10/10 pain and unable to lay supine. Patietn will improve FOTO to at least 48 to demonstrate improved function. Eval: FOTO: 28  DC: regressed FOTO score 22%  2. Patient will be able to perform 5 sit to stand transfers with only 1 UE support to increase ease with car transfers. Eval: (B) UE support with sit to stand transfers. DC: no change B UE support and CGA required for safety with pt performing 4 reps. 3. Patient will report avg pain level no more then 4/10 to increase ease with work related tasks. Eval: Patient reports 10/10 pain level               DC: slow progress 7-8/10 on average    Assessment/ Summary of Care:    The pt has had minimal change in L/S
MD STACIE [facility] BS AMB   11/29/2023  9:00 AM [facility] BS AMB   12/6/2023  9:00 AM MARIBEL Yanez NP [facility] BS AMB   12/12/2023  9:00 AM Horacio Chaudhry APRN - NP [facility] BS AMB

## 2023-11-27 ASSESSMENT — ENCOUNTER SYMPTOMS
RESPIRATORY NEGATIVE: 1
ABDOMINAL PAIN: 0

## 2023-11-27 NOTE — ED PROVIDER NOTES
[facility] EMERGENCY DEPT  EMERGENCY DEPARTMENT ENCOUNTER      Pt Name: Brianna Chiu  MRN: [id number]  [address] [facility] 1955  Date of evaluation: 11/10/2023  Provider: Trino Zarate MD    CHIEF COMPLAINT       Chief Complaint   Patient presents with    Chest Pain         HISTORY OF PRESENT ILLNESS   (Location/Symptom, Timing/Onset, Context/Setting, Quality, Duration, Modifying Factors, Severity)  Note limiting factors. Brianna Chiu is a 79 y.o. female who presents to the emergency department     60-year-old female past medical history of hypertension and tendinitis presents emergency department with left chest sharp pain for the past 2 days. Patient has no fevers or chills no cough no shortness of breath no shortness of breath with exertion. No abdominal pain no other issues expressed. The history is provided by the patient. No  was used. Nursing Notes were reviewed. REVIEW OF SYSTEMS    (2-9 systems for level 4, 10 or more for level 5)     Review of Systems   Constitutional:  Negative for chills and fever. Respiratory: Negative. Cardiovascular:  Positive for chest pain. Negative for palpitations and leg swelling. Gastrointestinal:  Negative for abdominal pain. Neurological:  Negative for seizures. Hematological: Negative. Except as noted above the remainder of the review of systems was reviewed and negative.        PAST MEDICAL HISTORY     Past Medical History:   Diagnosis Date    Essential hypertension, benign     Tendinitis          SURGICAL HISTORY       Past Surgical History:   Procedure Laterality Date    APPENDECTOMY      BREAST LUMPECTOMY Right          CURRENT MEDICATIONS       Discharge Medication List as of 11/10/2023  8:20 AM        CONTINUE these medications which have NOT CHANGED    Details   !! diclofenac (VOLTAREN) 75 MG EC tablet Take 1 tablet by mouth 2 times daily, Disp-60 tablet, R-5Normal      !! diclofenac (VOLTAREN) 50 MG EC tablet Take 1 tablet

## 2023-12-13 ENCOUNTER — OFFICE VISIT (OUTPATIENT)
Age: 68
End: 2023-12-13
Payer: MEDICARE

## 2023-12-13 VITALS
BODY MASS INDEX: 30.37 KG/M2 | TEMPERATURE: 98.1 F | HEART RATE: 102 BPM | OXYGEN SATURATION: 98 % | HEIGHT: 66 IN | WEIGHT: 189 LBS

## 2023-12-13 DIAGNOSIS — M54.41 CHRONIC BILATERAL LOW BACK PAIN WITH BILATERAL SCIATICA: ICD-10-CM

## 2023-12-13 DIAGNOSIS — M54.16 LUMBAR RADICULOPATHY: ICD-10-CM

## 2023-12-13 DIAGNOSIS — M54.42 CHRONIC BILATERAL LOW BACK PAIN WITH BILATERAL SCIATICA: ICD-10-CM

## 2023-12-13 DIAGNOSIS — M47.816 LUMBAR SPONDYLOSIS: Primary | ICD-10-CM

## 2023-12-13 DIAGNOSIS — G89.29 CHRONIC BILATERAL LOW BACK PAIN WITH BILATERAL SCIATICA: ICD-10-CM

## 2023-12-13 PROCEDURE — G8400 PT W/DXA NO RESULTS DOC: HCPCS | Performed by: NURSE PRACTITIONER

## 2023-12-13 PROCEDURE — G8484 FLU IMMUNIZE NO ADMIN: HCPCS | Performed by: NURSE PRACTITIONER

## 2023-12-13 PROCEDURE — 1090F PRES/ABSN URINE INCON ASSESS: CPT | Performed by: NURSE PRACTITIONER

## 2023-12-13 PROCEDURE — 1123F ACP DISCUSS/DSCN MKR DOCD: CPT | Performed by: NURSE PRACTITIONER

## 2023-12-13 PROCEDURE — 3017F COLORECTAL CA SCREEN DOC REV: CPT | Performed by: NURSE PRACTITIONER

## 2023-12-13 PROCEDURE — 99213 OFFICE O/P EST LOW 20 MIN: CPT | Performed by: NURSE PRACTITIONER

## 2023-12-13 PROCEDURE — G8427 DOCREV CUR MEDS BY ELIG CLIN: HCPCS | Performed by: NURSE PRACTITIONER

## 2023-12-13 PROCEDURE — 1036F TOBACCO NON-USER: CPT | Performed by: NURSE PRACTITIONER

## 2023-12-13 PROCEDURE — G8417 CALC BMI ABV UP PARAM F/U: HCPCS | Performed by: NURSE PRACTITIONER

## 2024-01-09 ENCOUNTER — HOSPITAL ENCOUNTER (OUTPATIENT)
Facility: HOSPITAL | Age: 69
Discharge: HOME | End: 2024-01-12
Payer: MEDICARE

## 2024-01-09 DIAGNOSIS — M54.41 CHRONIC BILATERAL LOW BACK PAIN WITH BILATERAL SCIATICA: ICD-10-CM

## 2024-01-09 DIAGNOSIS — M54.16 LUMBAR RADICULOPATHY: ICD-10-CM

## 2024-01-09 DIAGNOSIS — M47.816 LUMBAR SPONDYLOSIS: ICD-10-CM

## 2024-01-09 DIAGNOSIS — G89.29 CHRONIC BILATERAL LOW BACK PAIN WITH BILATERAL SCIATICA: ICD-10-CM

## 2024-01-09 DIAGNOSIS — M54.42 CHRONIC BILATERAL LOW BACK PAIN WITH BILATERAL SCIATICA: ICD-10-CM

## 2024-01-09 PROCEDURE — 72148 MRI LUMBAR SPINE W/O DYE: CPT

## 2024-01-11 DIAGNOSIS — E55.9 HYPOVITAMINOSIS D: ICD-10-CM

## 2024-01-11 DIAGNOSIS — I10 ESSENTIAL (PRIMARY) HYPERTENSION: Primary | ICD-10-CM

## 2024-01-11 DIAGNOSIS — E11.9 TYPE 2 DIABETES MELLITUS WITHOUT COMPLICATION, WITHOUT LONG-TERM CURRENT USE OF INSULIN (HCC): ICD-10-CM

## 2024-01-12 LAB
25(OH)D3+25(OH)D2 SERPL-MCNC: 9.2 NG/ML (ref 30–100)
ALBUMIN SERPL-MCNC: 4 G/DL (ref 3.9–4.9)
ALBUMIN/GLOB SERPL: 1.2 {RATIO} (ref 1.2–2.2)
ALP SERPL-CCNC: 88 IU/L (ref 44–121)
ALT SERPL-CCNC: 30 IU/L (ref 0–32)
AST SERPL-CCNC: 23 IU/L (ref 0–40)
BASOPHILS # BLD AUTO: 0.1 X10E3/UL (ref 0–0.2)
BASOPHILS NFR BLD AUTO: 1 %
BILIRUB SERPL-MCNC: 0.3 MG/DL (ref 0–1.2)
BUN SERPL-MCNC: 16 MG/DL (ref 8–27)
BUN/CREAT SERPL: 24 (ref 12–28)
CALCIUM SERPL-MCNC: 9.7 MG/DL (ref 8.7–10.3)
CHLORIDE SERPL-SCNC: 106 MMOL/L (ref 96–106)
CHOLEST SERPL-MCNC: 193 MG/DL (ref 100–199)
CO2 SERPL-SCNC: 24 MMOL/L (ref 20–29)
CREAT SERPL-MCNC: 0.68 MG/DL (ref 0.57–1)
EGFRCR SERPLBLD CKD-EPI 2021: 95 ML/MIN/1.73
EOSINOPHIL # BLD AUTO: 0.2 X10E3/UL (ref 0–0.4)
EOSINOPHIL NFR BLD AUTO: 2 %
ERYTHROCYTE [DISTWIDTH] IN BLOOD BY AUTOMATED COUNT: 13.7 % (ref 11.7–15.4)
GLOBULIN SER CALC-MCNC: 3.3 G/DL (ref 1.5–4.5)
GLUCOSE SERPL-MCNC: 83 MG/DL (ref 70–99)
HBA1C MFR BLD: 6.8 % (ref 4.8–5.6)
HCT VFR BLD AUTO: 41 % (ref 34–46.6)
HDLC SERPL-MCNC: 42 MG/DL
HGB BLD-MCNC: 13.1 G/DL (ref 11.1–15.9)
IMM GRANULOCYTES # BLD AUTO: 0 X10E3/UL (ref 0–0.1)
IMM GRANULOCYTES NFR BLD AUTO: 0 %
LDLC SERPL CALC-MCNC: 117 MG/DL (ref 0–99)
LYMPHOCYTES # BLD AUTO: 3.8 X10E3/UL (ref 0.7–3.1)
LYMPHOCYTES NFR BLD AUTO: 38 %
MCH RBC QN AUTO: 26.1 PG (ref 26.6–33)
MCHC RBC AUTO-ENTMCNC: 32 G/DL (ref 31.5–35.7)
MCV RBC AUTO: 82 FL (ref 79–97)
MONOCYTES # BLD AUTO: 0.5 X10E3/UL (ref 0.1–0.9)
MONOCYTES NFR BLD AUTO: 5 %
NEUTROPHILS # BLD AUTO: 5.5 X10E3/UL (ref 1.4–7)
NEUTROPHILS NFR BLD AUTO: 54 %
PLATELET # BLD AUTO: 398 X10E3/UL (ref 150–450)
POTASSIUM SERPL-SCNC: 4.4 MMOL/L (ref 3.5–5.2)
PROT SERPL-MCNC: 7.3 G/DL (ref 6–8.5)
RBC # BLD AUTO: 5.02 X10E6/UL (ref 3.77–5.28)
SODIUM SERPL-SCNC: 142 MMOL/L (ref 134–144)
TRIGL SERPL-MCNC: 195 MG/DL (ref 0–149)
VLDLC SERPL CALC-MCNC: 34 MG/DL (ref 5–40)
WBC # BLD AUTO: 10.1 X10E3/UL (ref 3.4–10.8)

## 2024-01-18 ENCOUNTER — APPOINTMENT (OUTPATIENT)
Facility: HOSPITAL | Age: 69
End: 2024-01-18
Payer: MEDICARE

## 2024-01-18 ENCOUNTER — OFFICE VISIT (OUTPATIENT)
Age: 69
End: 2024-01-18
Payer: MEDICARE

## 2024-01-18 ENCOUNTER — HOSPITAL ENCOUNTER (EMERGENCY)
Facility: HOSPITAL | Age: 69
Discharge: HOME | End: 2024-01-18
Payer: MEDICARE

## 2024-01-18 VITALS
SYSTOLIC BLOOD PRESSURE: 141 MMHG | BODY MASS INDEX: 29.73 KG/M2 | DIASTOLIC BLOOD PRESSURE: 78 MMHG | HEIGHT: 66 IN | HEART RATE: 87 BPM | OXYGEN SATURATION: 98 % | RESPIRATION RATE: 17 BRPM | TEMPERATURE: 98 F | WEIGHT: 185 LBS

## 2024-01-18 DIAGNOSIS — E11.9 TYPE 2 DIABETES MELLITUS WITHOUT COMPLICATION, WITHOUT LONG-TERM CURRENT USE OF INSULIN (HCC): Primary | ICD-10-CM

## 2024-01-18 DIAGNOSIS — I10 ESSENTIAL (PRIMARY) HYPERTENSION: ICD-10-CM
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
## 2024-01-18 DIAGNOSIS — E78.5 HYPERLIPIDEMIA, UNSPECIFIED HYPERLIPIDEMIA TYPE: ICD-10-CM

## 2024-01-18 DIAGNOSIS — E55.9 HYPOVITAMINOSIS D: ICD-10-CM

## 2024-01-18 PROCEDURE — 3077F SYST BP >= 140 MM HG: CPT | Performed by: NURSE PRACTITIONER

## 2024-01-18 PROCEDURE — 72125 CT NECK SPINE W/O DYE: CPT

## 2024-01-18 PROCEDURE — 1123F ACP DISCUSS/DSCN MKR DOCD: CPT | Performed by: NURSE PRACTITIONER

## 2024-01-18 PROCEDURE — 3079F DIAST BP 80-89 MM HG: CPT | Performed by: NURSE PRACTITIONER

## 2024-01-18 PROCEDURE — 99284 EMERGENCY DEPT VISIT MOD MDM: CPT

## 2024-01-18 PROCEDURE — 3044F HG A1C LEVEL LT 7.0%: CPT | Performed by: NURSE PRACTITIONER

## 2024-01-18 PROCEDURE — 99214 OFFICE O/P EST MOD 30 MIN: CPT | Performed by: NURSE PRACTITIONER

## 2024-01-18 PROCEDURE — 70450 CT HEAD/BRAIN W/O DYE: CPT

## 2024-01-18 PROCEDURE — 73562 X-RAY EXAM OF KNEE 3: CPT

## 2024-01-18 RX ORDER — ACETAMINOPHEN 500 MG
500 TABLET ORAL 4 TIMES DAILY PRN
Qty: 30 TABLET | Refills: 0 | Status: SHIPPED | OUTPATIENT
Start: 2024-01-18

## 2024-01-18 RX ORDER — LIDOCAINE 50 MG/G
1 PATCH TOPICAL DAILY
Qty: 10 PATCH | Refills: 0 | Status: SHIPPED | OUTPATIENT
Start: 2024-01-18 | End: 2024-01-28

## 2024-01-18 RX ORDER — LIDOCAINE 4 G/G
1 PATCH TOPICAL
Status: DISCONTINUED | OUTPATIENT
Start: 2024-01-18 | End: 2024-01-18 | Stop reason: HOSPADM

## 2024-01-18 RX ORDER — ERGOCALCIFEROL 1.25 MG/1
50000 CAPSULE ORAL WEEKLY
Qty: 12 CAPSULE | Refills: 1 | Status: SHIPPED | OUTPATIENT
Start: 2024-01-18

## 2024-01-18 ASSESSMENT — ENCOUNTER SYMPTOMS
DIARRHEA: 0
NAUSEA: 0
ABDOMINAL DISTENTION: 0
ABDOMINAL PAIN: 0
VOMITING: 0
BACK PAIN: 1
RHINORRHEA: 0

## 2024-01-18 ASSESSMENT — PAIN SCALES - GENERAL: PAINLEVEL_OUTOF10: 8

## 2024-01-18 ASSESSMENT — PAIN - FUNCTIONAL ASSESSMENT: PAIN_FUNCTIONAL_ASSESSMENT: 0-10

## 2024-01-18 NOTE — ED TRIAGE NOTES
Pt brought in by EMS after fall in parking lot, pt Aox4, reports she hit the back of her head. Pt c/o head, mid back, and L knee pain. Denies LOC or blood thinners. No obvious deformities.

## 2024-01-18 NOTE — PROGRESS NOTES
Assessment/Plan:  Justine was seen today for diabetes, hypertension and cholesterol problem.    Diagnoses and all orders for this visit:    Type 2 diabetes mellitus without complication, without long-term current use of insulin (HCC)    Essential (primary) hypertension    BMI 30.0-30.9,adult    Hyperlipidemia, unspecified hyperlipidemia type    Hypovitaminosis D  -     vitamin D (ERGOCALCIFEROL) 1.25 MG (86503 UT) CAPS capsule; Take 1 capsule by mouth once a week      Reviewed diet, exercise and weight control.  Recommended sodium restriction..  Educational material distributed.  Addressed ADA diet.    I have discussed the diagnosis with the patient and the intended plan as seen in the above orders.  The patient has received an after-visit summary and questions were answered concerning future plans.  I have discussed medication side effects and warnings with the patient as well.  Patient agreeable with above plan and verbalizes understanding.    Return in about 4 months (around 5/18/2024) for DM/HTN/HLD, fasting labs 1 week prior, in office.    Subjective:    Justine Velasco is a 68 y.o. year old female alone  seen in follow up for   Chief Complaint   Patient presents with    Diabetes    Hypertension    Cholesterol Problem      She also has hypertension and hyperlipidemia.  Diabetic Review of Systems - medication compliance: compliant all of the time, diabetic diet compliance: noncompliant some of the time, home glucose monitoring: is not performed, further diabetic ROS: no polyuria or polydipsia, no chest pain, dyspnea or TIA's, no numbness, tingling or pain in extremities.  Last eye exam 1 week ago ([facility]), due to follow up in 1 year.    The following have been reviewed labs are reviewed, up to date and discussed with patient in detail.     Other symptoms and concerns: patient states she has been falling since her last visit.  Comments with her tendonitis she feels unstable.  Once she fell in the bathroom and

## 2024-01-18 NOTE — ED PROVIDER NOTES
EMERGENCY DEPARTMENT HISTORY AND PHYSICAL EXAM      Patient Name: Justine Velasco  MRN: [id number]  YOB: 1955  Provider: Nicole Renteria PA-C  PCP: Praveena Cueva APRN - NP   Time/Date of evaluation: 6:18 PM EST on 1/18/24    History of Presenting Illness     Chief Complaint   Patient presents with    Fall       History Provided By: Patient     History (Narative):   Justine Velasco is a 68 y.o. female with a PMHX of hypertension, tendinitis  who presents to the emergency department  by EMS C/O a fall.  Patient states that she was locked out of her car at [employer], and had to walk to a nearby [employer] for help, patient states that she has problems with her gait at baseline due to chronic pain and tendinitis.  Patient states that she missed stepped and fell backwards on the sidewalk hitting her head and neck.  Patient states that she is mainly having pain in her left knee and her lower lumbar area.    She denies any blood thinners, loss of consciousness, blurred vision, numbness, weakness, saddle anesthesia, loss of bowel or bladder, difficulty with urination or defecation.         Past History     Past Medical History:  Past Medical History:   Diagnosis Date    Essential hypertension, benign     Tendinitis        Past Surgical History:  Past Surgical History:   Procedure Laterality Date    APPENDECTOMY      BREAST LUMPECTOMY Right        Family History:  Family History   Problem Relation Age of Onset    No Known Problems Mother     No Known Problems Father     No Known Problems Sister     No Known Problems Brother     No Known Problems Maternal Aunt     No Known Problems Maternal Uncle     No Known Problems Paternal Aunt     No Known Problems Paternal Uncle     No Known Problems Maternal Grandmother     No Known Problems Maternal Grandfather     No Known Problems Paternal Grandmother     No Known Problems Paternal Grandfather     Hypertension Other        Social History:  Social History     Tobacco Use

## 2024-02-14 ENCOUNTER — OFFICE VISIT (OUTPATIENT)
Age: 69
End: 2024-02-14
Payer: MEDICARE

## 2024-02-14 VITALS
HEIGHT: 66 IN | OXYGEN SATURATION: 100 % | WEIGHT: 182.4 LBS | RESPIRATION RATE: 18 BRPM | BODY MASS INDEX: 29.32 KG/M2 | HEART RATE: 98 BPM | TEMPERATURE: 98.3 F

## 2024-02-14 DIAGNOSIS — M54.42 CHRONIC BILATERAL LOW BACK PAIN WITH BILATERAL SCIATICA: Primary | ICD-10-CM

## 2024-02-14 DIAGNOSIS — M54.41 CHRONIC BILATERAL LOW BACK PAIN WITH BILATERAL SCIATICA: Primary | ICD-10-CM

## 2024-02-14 DIAGNOSIS — R26.9 ABNORMAL GAIT: ICD-10-CM

## 2024-02-14 DIAGNOSIS — M47.816 LUMBAR SPONDYLOSIS: ICD-10-CM

## 2024-02-14 DIAGNOSIS — G89.29 CHRONIC BILATERAL LOW BACK PAIN WITH BILATERAL SCIATICA: Primary | ICD-10-CM

## 2024-02-14 PROCEDURE — 99214 OFFICE O/P EST MOD 30 MIN: CPT | Performed by: PHYSICAL MEDICINE & REHABILITATION

## 2024-02-14 PROCEDURE — 1123F ACP DISCUSS/DSCN MKR DOCD: CPT | Performed by: PHYSICAL MEDICINE & REHABILITATION

## 2024-02-14 NOTE — PROGRESS NOTES
[facility]  [address] [place] [address], [address]  [place] [address]  Phone: [phone]  Fax: [phone]      Patient: Justine Velasco                                                                              MRN: [id number]        YOB: 1955          AGE: 68 y.o.             PCP: Praveena Cueva APRN - NP  Date:  02/14/24    Reason for Consultation: Back Problem, Pain, and Back Pain      HPI:  Justine Velasco is a 68 y.o. female with relevant PMH of DM, HTN, lymphedema  who presents with low back pain which began many years ago.  Denies any precipitating incident or trauma.         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    Neurologic symptoms: No numbness, tingling, weakness, bowel or bladder changes.+ fall 1/21/2024- went to ED, CT head neck and neck unremarkable.      Location: The pain is located in the low back pain   Radiation: The pain does radiate into the let thigh towards the ankle    Pain Score: Currently: 8/10    Quality: Pain is of a throbbing quality.    Aggravating: Pain is exacerbated by walking, sitting, and standing  Alleviating: The pain is alleviated by lying down    Prior Treatments:  Physical therapy: Yes  Injections:Yes  Surgery:No  Previous Medications:   Current Medications: diclofenac 75mg   Previous work-up has included:   CT cervical spine 1/18/2024  Alignment - normal AP alignment.     Vertebral body height -  maintained.      There is no intracanal high density collection.     Disc space - mild disc space narrowing C3/4. Moderate disc space narrowing C5/6  with marginal artery.     MRI lumbar spine 1/9/2024-  Images: The imaging results as well as the actual images of the studies below were reviewed, visualized and interpreted by me 2/14/2024  T11-T12, T12-L1: Sagittal plane only. No significant degenerative change or  stenosis.     L1/L2: No significant disc disease. Mild facet arthropathy. Spinal canal

## 2024-02-14 NOTE — PROGRESS NOTES
Justine Velasco presents today for   Chief Complaint   Patient presents with    Back Problem    Pain    Back Pain       Is someone accompanying this pt? no    Is the patient using any DME equipment during OV? Yes, cane    Depression Screening:       No data to display                Learning Assessment:      Abuse Screening:       No data to display                Fall Risk      OPIOID RISK TOOL      Coordination of Care:  1. Have you been to the ER, urgent care clinic since your last visit? Yes fell on head  Hospitalized since your last visit? no    2. Have you seen or consulted any other health care providers outside of the [facility] since your last visit? no Include any pap smears or colon screening. no

## 2024-02-20 ENCOUNTER — TELEPHONE (OUTPATIENT)
Age: 69
End: 2024-02-20

## 2024-02-20 NOTE — TELEPHONE ENCOUNTER
PT came in asking for a new pt order. PT would like to start back at [facility]

## 2024-03-03 VITALS
OXYGEN SATURATION: 98 % | SYSTOLIC BLOOD PRESSURE: 150 MMHG | TEMPERATURE: 97.7 F | BODY MASS INDEX: 29.57 KG/M2 | WEIGHT: 184 LBS | HEIGHT: 66 IN | DIASTOLIC BLOOD PRESSURE: 82 MMHG | HEART RATE: 88 BPM | RESPIRATION RATE: 20 BRPM

## 2024-03-08 ENCOUNTER — TELEPHONE (OUTPATIENT)
Age: 69
End: 2024-03-08

## 2024-03-08 DIAGNOSIS — D25.9 LEIOMYOMA OF BODY OF UTERUS: Primary | ICD-10-CM

## 2024-03-08 NOTE — TELEPHONE ENCOUNTER
Attempted to contact the pt regarding the MRI results. She was not able to be reached. A message was left on the home number asking the pt to call the office at her convenience regarding MRI results. The number to the office was provided. The mobile number goes to a male with a different name. A message was not left on the voicemail.

## 2024-03-08 NOTE — TELEPHONE ENCOUNTER
In reviewing the patient's LS MRI an incidental finding was noted by the radiologist.  It show  leiomyomatous changes which is a fibroid in the fundus of the uterus.  These are mostly benign.  The radiologist recommends an US to further characterize it.  See if the patient wants to do this or if she wants to go to her GYN.

## 2024-03-11 NOTE — TELEPHONE ENCOUNTER
Second attempt to reach the pt was unsuccessful. Another message was left for the pt asking them to contact the office regarding her MRI results. The number to the office was provided.

## 2024-03-12 NOTE — TELEPHONE ENCOUNTER
I put in the ultrasound order.  Dr. Morales were you going to order the PT?  Per your note:  Physical therapy -  would consider referral back to PT in a few months for gait training

## 2024-03-12 NOTE — TELEPHONE ENCOUNTER
The pt called the office back. The pt was identified using 2 pt identifiers. She was notified of the incidental finding on the lumbar MRI. The pt verbalized understanding and thinks that she was told this when she was here for her last appt. I let the pt know that the radiologist is recommending an ultrasound for further evaluation. She verbalized understanding and is ok with us ordering the ultrasound. She is seeing Praveena Cueva NP for primary care and does not have a gynecologist. I let the pt know that an order will be placed for this and that she will be contacted by the scheduling dept for [facility]. The pt also wanted to know if there was going to be an order for physical therapy. She states that this was mentioned at her last appt and she has not heard from anyone about this. A chart review was done and it was mentioned but no referral was placed. The message will be forwarded to the NP first for review.

## 2024-03-13 NOTE — TELEPHONE ENCOUNTER
I tried to call the pt to see where she would like to have physical therapy done. She was not able to be reached. A message was no able to be left for the pt. The voicemail option has not been set up yet.

## 2024-03-15 NOTE — TELEPHONE ENCOUNTER
Second attempt to contact the pt was unsuccessful. Voicemail has not been set up for the pt. Unable to leave a message.

## 2024-03-18 ENCOUNTER — TELEPHONE (OUTPATIENT)
Age: 69
End: 2024-03-18

## 2024-03-18 NOTE — TELEPHONE ENCOUNTER
Patient walked into office today, I printed out and handed her the referrals for PT and OB US, I called and  attempted patient a PT appt with in motion therapy at [facility], was on hold for 15 min trying to schedule an appt and hung up due to long wait time.

## 2024-03-26 ENCOUNTER — HOSPITAL ENCOUNTER (OUTPATIENT)
Facility: HOSPITAL | Age: 69
Setting detail: RECURRING SERIES
Discharge: HOME | End: 2024-03-29
Payer: MEDICARE

## 2024-03-26 PROCEDURE — 97162 PT EVAL MOD COMPLEX 30 MIN: CPT

## 2024-03-26 NOTE — PROGRESS NOTES
gait/minimal heel strike; instances of instability.     Strength (MMT):                    Hip L (1-5) R (1-5)   Hip Flexion 2+ 3-                              Knee L (1-5) R (1-5)   Knee Flexion 3 4-   Knee Extension 3 4-   Ankle PF 2    Ankle DF 2-    Other       Balance/ Equilibrium:              Sitting Balance: Static:  [x] Good    [] Fair    [] Poor         Standing Balance: Static:   [] Good    [] Fair    [x] Poor     Dynamic:   [] Good    [] Fair    [x] Poor        Protective Extension:     [x] Delayed     Functional Mobility      Transfers:       Sit-Stand: patient required mod assist from therapist when transferring out of a chair without arm rests. Patient able to do it independently with (B) UE assist with a chair with arm rest. Patient denied pain with the transfer.      Behavior: [x] Cooperative      Cognition:   [x] Multiple Commands      Optional Tests:    Other test /comments:  Floor: Romberg EO: 30sec (unstable and increased UE movement to maintain stability noted) EC: 5seconds   MSR: EO:10sec    Romberg with head turns-25sec, but instability throughout   Foam: Romberg: EO: 5sec  Delayd protective reaction. Therapist provided SBA/CGA with balance activities and walking due to safety concerns and increased instability.     Ankle AROM DF: right: 10deg, left: lacking 5 deg     PF: right: 30deg, left: 20deg   Patient confirmed no numbness in (B) feet/left LE    ASSESSMENT/Changes in Function: See POC. Advised patient to perform HEP gently and to stop if pain increases.     Patient will continue to benefit from skilled PT services to modify and progress therapeutic interventions, analyze and address functional mobility deficits, analyze and address ROM deficits, analyze and address strength deficits, analyze and address soft tissue restrictions, analyze and cue for proper movement patterns, analyze and modify for postural abnormalities, and analyze and address imbalance/dizziness to address functional 
- *  Insurance: Payor: [facility] MEDICARE / Plan: [facility] [employer] MEDIBLUE PLUS / Product Type: *No Product type* /      ** Signature, Date and Time must be completed for valid certification **  Please [facility] and [facility] or you may fax the signed copy to [phone]. Thank you.

## 2024-03-28 ENCOUNTER — APPOINTMENT (OUTPATIENT)
Facility: HOSPITAL | Age: 69
End: 2024-03-28
Payer: MEDICARE

## 2024-04-05 ENCOUNTER — APPOINTMENT (OUTPATIENT)
Facility: HOSPITAL | Age: 69
End: 2024-04-05
Payer: MEDICARE

## 2024-04-10 ENCOUNTER — TELEPHONE (OUTPATIENT)
Facility: HOSPITAL | Age: 69
End: 2024-04-10

## 2024-04-10 ENCOUNTER — APPOINTMENT (OUTPATIENT)
Facility: HOSPITAL | Age: 69
End: 2024-04-10
Payer: MEDICARE

## 2024-04-12 ENCOUNTER — APPOINTMENT (OUTPATIENT)
Facility: HOSPITAL | Age: 69
End: 2024-04-12
Payer: MEDICARE

## 2024-04-12 ENCOUNTER — HOSPITAL ENCOUNTER (OUTPATIENT)
Facility: HOSPITAL | Age: 69
Setting detail: RECURRING SERIES
Discharge: HOME | End: 2024-04-15
Payer: MEDICARE

## 2024-04-12 PROCEDURE — 97110 THERAPEUTIC EXERCISES: CPT

## 2024-04-12 PROCEDURE — 97112 NEUROMUSCULAR REEDUCATION: CPT

## 2024-04-12 NOTE — PROGRESS NOTES
CGA/SBA    4. Patient will be able to stand on foam with EO for 30 seconds without LOB to increase safety with walking on uneven surfaces.               Eval: Foam: Romberg: EO: 5sec     PLAN  Yes  Continue plan of care  []  Upgrade activities as tolerated  []  Discharge due to :  []  Other:    Ted Payton PTA    4/12/2024    2:39 PM    Future Appointments   Date Time Provider Department Center   4/16/2024 10:00 AM Camila Chaudhry APRN - NP [facility]   4/17/2024  2:00 PM Omar Acosta MD [facility]   4/17/2024  3:10 PM Angel Portillo, PT [facility] [place]   4/19/2024  2:30 PM Joann Cortes, PTA [facility] [place]   4/22/2024  1:50 PM Jose Weems, PT [facility] [place]   4/25/2024  1:50 PM Joann Cortes, PTA [facility] [place]   5/14/2024  9:15 AM [facility] AM[facility]   5/21/2024  9:00 AM Praveena Cueva APRN - NP [facility] AM[facility]

## 2024-04-16 ENCOUNTER — OFFICE VISIT (OUTPATIENT)
Age: 69
End: 2024-04-16
Payer: MEDICARE

## 2024-04-16 VITALS
OXYGEN SATURATION: 98 % | HEIGHT: 66 IN | WEIGHT: 179.2 LBS | BODY MASS INDEX: 28.8 KG/M2 | TEMPERATURE: 98.2 F | HEART RATE: 82 BPM | RESPIRATION RATE: 18 BRPM
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
## 2024-04-16 PROCEDURE — 1123F ACP DISCUSS/DSCN MKR DOCD: CPT | Performed by: NURSE PRACTITIONER

## 2024-04-16 PROCEDURE — 99213 OFFICE O/P EST LOW 20 MIN: CPT | Performed by: NURSE PRACTITIONER

## 2024-04-16 NOTE — PROGRESS NOTES
Chief complaint   Chief Complaint   Patient presents with    Back Problem    Pain    Back Pain       History of Present Illness:  Justine Velasco is a  68 y.o.  female who comes in today after last seen Dr. Morales on February 14, 2024 for MRI follow-up  Her MRI did show some facet arthropathy but no stenosis.  Dr. Morales did not fill her pain or falls were coming from her back.  Patient states she does get some back pain but does not bother sugarless is the falls in her left thigh jumping.  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  She did have incidental finding nodularity in the uterine fundus and has been ordered ultrasound but she has not had that done yet.  She denies fever bowel bladder dysfunction.  She was placed in physical therapy for gait training and they recommended she use a rolling walker instead of a single-point cane.      Physical Exam: The patient is a 68-year-old female well-developed well-nourished who is alert and oriented with a normal mood and affect.  She has a full weightbearing slow gait utilizing a single-point cane.  She walks Flat-footed.  She has 4 out of 5 strength bilateral lower extremities.  Negative straight leg raise.      Assessment and Plan: This is a patient who is having multiple falls.  I will refer her to neurology for an evaluation.  I encouraged her to get the ultrasound of her uterus done.  We will see her back as needed.    Justine was seen today for back problem, pain and back pain.    Diagnoses and all orders for this visit:    roshan Larios  -     [facility] - Ruba Farmer MD, Neurology, [place]        No follow-ups on file.      has been .reviewed and is appropriate    Medications:  Current Outpatient Medications   Medication Sig Dispense Refill    vitamin D (ERGOCALCIFEROL) 1.25 MG (30079 UT) CAPS capsule Take 1 capsule

## 2024-04-16 NOTE — PROGRESS NOTES
Justine Velasco presents today for   Chief Complaint   Patient presents with    Back Problem    Pain    Back Pain       Is someone accompanying this pt? no    Is the patient using any DME equipment during OV? Yes cane    Depression Screening:       No data to display                Learning Assessment:  Failed to redirect to the Timeline version of the [employer] SmartLink.    Abuse Screening:       No data to display                Fall Risk  Failed to redirect to the Timeline version of the [employer] SmartLink.    OPIOID RISK TOOL  Failed to redirect to the Timeline version of the [employer] SmartLink.    Coordination of Care:  1. Have you been to the ER, urgent care clinic since your last visit? no  Hospitalized since your last visit? no    2. Have you seen or consulted any other health care providers outside of the [facility] since your last visit? no Include any pap smears or colon screening. no     No Decelerations

## 2024-04-17 ENCOUNTER — APPOINTMENT (OUTPATIENT)
Facility: HOSPITAL | Age: 69
End: 2024-04-17
Payer: MEDICARE

## 2024-04-17 ENCOUNTER — HOSPITAL ENCOUNTER (OUTPATIENT)
Facility: HOSPITAL | Age: 69
Setting detail: RECURRING SERIES
End: 2024-04-17
Payer: MEDICARE

## 2024-04-17 ENCOUNTER — HOSPITAL ENCOUNTER (EMERGENCY)
Facility: HOSPITAL | Age: 69
Discharge: HOME | End: 2024-04-17
Attending: EMERGENCY MEDICINE
Payer: MEDICARE

## 2024-04-17 VITALS
OXYGEN SATURATION: 100 % | HEART RATE: 94 BPM | RESPIRATION RATE: 16 BRPM | DIASTOLIC BLOOD PRESSURE: 107 MMHG | TEMPERATURE: 98.6 F | SYSTOLIC BLOOD PRESSURE: 175 MMHG

## 2024-04-17 DIAGNOSIS — I10 ASYMPTOMATIC HYPERTENSION: Primary | ICD-10-CM

## 2024-04-17 PROCEDURE — 99283 EMERGENCY DEPT VISIT LOW MDM: CPT

## 2024-04-17 RX ORDER — AMLODIPINE BESYLATE 5 MG/1
5 TABLET ORAL DAILY
Qty: 90 TABLET | Refills: 0 | Status: SHIPPED | OUTPATIENT
Start: 2024-04-17

## 2024-04-17 ASSESSMENT — PAIN - FUNCTIONAL ASSESSMENT: PAIN_FUNCTIONAL_ASSESSMENT: NONE - DENIES PAIN

## 2024-04-17 NOTE — ED TRIAGE NOTES
Pt c/o 2 high BP readings at 2 separate appts btwn yesterday and today. Pt c/o tenditis and family stresses as well. Denies dizziness.

## 2024-04-17 NOTE — ED PROVIDER NOTES
500 MG extended release tablet Take 1 tablet by mouth Daily with supper (Patient not taking: Reported on 1/18/2024)      triamcinolone (ARISTOCORT) 0.5 % cream Apply 2 g topically 2 times daily (Patient not taking: Reported on 1/18/2024)         Past History     Past Medical History:  Past Medical History:   Diagnosis Date    Essential hypertension, benign     Tendinitis        Past Surgical History:  Past Surgical History:   Procedure Laterality Date    APPENDECTOMY      BREAST LUMPECTOMY Right        Family History:  Family History   Problem Relation Age of Onset    No Known Problems Mother     No Known Problems Father     No Known Problems Sister     No Known Problems Brother     No Known Problems Maternal Aunt     No Known Problems Maternal Uncle     No Known Problems Paternal Aunt     No Known Problems Paternal Uncle     No Known Problems Maternal Grandmother     No Known Problems Maternal Grandfather     No Known Problems Paternal Grandmother     No Known Problems Paternal Grandfather     Hypertension Other        Social History:  Social History     Tobacco Use    Smoking status: Never     Passive exposure: Never    Smokeless tobacco: Never   Vaping Use    Vaping Use: Never used   Substance Use Topics    Alcohol use: No    Drug use: Never       Allergies:  Allergies   Allergen Reactions    Ibuprofen Dizziness or Vertigo         Review of Systems   Review of Systems    Physical Exam   Physical Exam    GENERAL: alert and oriented, no acute distress  EYES: PEERL, No injection, discharge or icterus.  ENT: Mucous membranes pink and moist.  NECK: Supple  LUNGS: Airway patent. Non-labored respirations. Breath sounds clear with good air entry bilaterally.  HEART: Regular rate and rhythm. No peripheral edema  ABDOMEN: Non-distended and non-tender, without guarding or rebound.  SKIN:  warm, dry  MSK/EXTREMITIES: Without swelling, tenderness or deformity, symmetric with normal ROM  NEUROLOGICAL: Alert,

## 2024-04-19 ENCOUNTER — APPOINTMENT (OUTPATIENT)
Facility: HOSPITAL | Age: 69
End: 2024-04-19
Payer: MEDICARE

## 2024-04-22 ENCOUNTER — APPOINTMENT (OUTPATIENT)
Facility: HOSPITAL | Age: 69
End: 2024-04-22
Payer: MEDICARE

## 2024-04-22 ENCOUNTER — TELEPHONE (OUTPATIENT)
Facility: HOSPITAL | Age: 69
End: 2024-04-22

## 2024-04-24 ENCOUNTER — APPOINTMENT (OUTPATIENT)
Facility: HOSPITAL | Age: 69
End: 2024-04-24
Payer: MEDICARE

## 2024-04-26 ENCOUNTER — APPOINTMENT (OUTPATIENT)
Facility: HOSPITAL | Age: 69
End: 2024-04-26
Payer: MEDICARE

## 2024-04-29 ENCOUNTER — APPOINTMENT (OUTPATIENT)
Facility: HOSPITAL | Age: 69
End: 2024-04-29
Payer: MEDICARE

## 2024-05-08 ENCOUNTER — TELEPHONE (OUTPATIENT)
Facility: HOSPITAL | Age: 69
End: 2024-05-08

## 2024-05-08 ENCOUNTER — OFFICE VISIT (OUTPATIENT)
Age: 69
End: 2024-05-08
Payer: MEDICARE

## 2024-05-08 VITALS — HEIGHT: 66 IN | BODY MASS INDEX: 28.92 KG/M2

## 2024-05-08 DIAGNOSIS — R26.89 BALANCE DISORDER: Primary | ICD-10-CM

## 2024-05-08 PROCEDURE — 1123F ACP DISCUSS/DSCN MKR DOCD: CPT | Performed by: ORTHOPAEDIC SURGERY

## 2024-05-08 PROCEDURE — 99214 OFFICE O/P EST MOD 30 MIN: CPT | Performed by: ORTHOPAEDIC SURGERY

## 2024-05-08 NOTE — PROGRESS NOTES
HCT 41.0 01/11/2024    MCV 82 01/11/2024     01/11/2024    LYMPHOPCT 38 01/11/2024    RBC 5.02 01/11/2024    MCH 26.1 (L) 01/11/2024    MCHC 32.0 01/11/2024    RDW 13.7 01/11/2024     Hemoglobin A1C   Date Value Ref Range Status   01/11/2024 6.8 (H) 4.8 - 5.6 % Final     Comment:                 Prediabetes: 5.7 - 6.4           Diabetes: >6.4           Glycemic control for adults with diabetes: <7.0     ,  Lab Results   Component Value Date     01/11/2024    K 4.4 01/11/2024     01/11/2024    CO2 24 01/11/2024    BUN 16 01/11/2024    CREATININE 0.68 01/11/2024    GLUCOSE 83 01/11/2024    CALCIUM 9.7 01/11/2024    BILITOT 0.3 01/11/2024    ALKPHOS 88 01/11/2024    AST 23 01/11/2024    ALT 30 01/11/2024    LABGLOM 95 01/11/2024    GFRAA >60 02/09/2022    AGRATIO 1.2 01/11/2024    GLOB 4.6 (H) 08/02/2023     Lab Results   Component Value Date/Time    VITD25 9.2 01/11/2024 01:56 AM    ,No results found for: \"INR\", \"PROTIME\", No results found for: \"APTT\"       REVIEW OF SYSTEMS : 5/8/2024  ALL BELOW ARE Negative except : SEE HPI     All other systems reviewed and are negative.     14 point review of systems otherwise negative unless noted in HPI.          I have spent 30 minutes reviewing the previous notes, reviewing diagnostic studies [Advanced  Imaging, Diagnostic test results (x-rays)] and had a direct face to face with the patient discussing the diagnosis and importance of compliance with the treatment and plan.  The treatment plan is listed in the above plan section of this Office encounter. I  answered all of her questions, as well as documenting patient care coordination for this individual on the day of the visit.      Disclaimer:     Sections of this note are dictated using utilizing voice recognition software, which may have resulted in some phonetic based errors in grammar and contents. Even though attempts were made to correct all the mistakes, some may have been missed, and remained in

## 2024-06-24 ENCOUNTER — APPOINTMENT (OUTPATIENT)
Facility: HOSPITAL | Age: 69
End: 2024-06-24
Payer: MEDICARE

## 2024-06-24 ENCOUNTER — HOSPITAL ENCOUNTER (EMERGENCY)
Facility: HOSPITAL | Age: 69
Discharge: HOME | End: 2024-06-24
Payer: MEDICARE

## 2024-06-24 VITALS
RESPIRATION RATE: 16 BRPM | HEART RATE: 88 BPM | TEMPERATURE: 98.2 F | DIASTOLIC BLOOD PRESSURE: 90 MMHG | SYSTOLIC BLOOD PRESSURE: 141 MMHG | OXYGEN SATURATION: 98 %

## 2024-06-24 DIAGNOSIS — S09.90XA INJURY OF HEAD, INITIAL ENCOUNTER: Primary | ICD-10-CM
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
## 2024-06-24 DIAGNOSIS — T14.8XXA HEMATOMA: ICD-10-CM

## 2024-06-24 DIAGNOSIS — S60.511A ABRASION OF RIGHT HAND, INITIAL ENCOUNTER: ICD-10-CM

## 2024-06-24 PROCEDURE — 70450 CT HEAD/BRAIN W/O DYE: CPT

## 2024-06-24 PROCEDURE — 99284 EMERGENCY DEPT VISIT MOD MDM: CPT

## 2024-06-24 PROCEDURE — 6370000000 HC RX 637 (ALT 250 FOR IP): Performed by: NURSE PRACTITIONER

## 2024-06-24 RX ORDER — ACETAMINOPHEN 325 MG/1
650 TABLET ORAL
Status: COMPLETED | OUTPATIENT
Start: 2024-06-24 | End: 2024-06-24

## 2024-06-24 RX ADMIN — ACETAMINOPHEN 325MG 650 MG: 325 TABLET ORAL at 10:34

## 2024-06-24 ASSESSMENT — PAIN SCALES - GENERAL: PAINLEVEL_OUTOF10: 5

## 2024-06-24 ASSESSMENT — PAIN DESCRIPTION - LOCATION: LOCATION: HEAD

## 2024-06-24 NOTE — ED PROVIDER NOTES
EMERGENCY DEPARTMENT HISTORY AND PHYSICAL EXAM    12:03 PM      Date: 6/24/2024  Patient Name: Justine Velasco    History of Presenting Illness     Chief Complaint   Patient presents with    Fall         History Provided By: Patient and medical chart review.    Additional History (Context): Justine Velasco is a 68 y.o. female with   Past Medical History:   Diagnosis Date    Essential hypertension, benign     Tendinitis    who presents with  complaints of fall that occurred today while she was in the [facility].  States she slipped on wet floor.  Reports she landed on her right side.  Complains of right head pain and right thumb abrasion.  Currently rating her head pain 7 out of 10.  Denies any loss of consciousness.  Patient reports she is not on a blood thinner monitor.  Denies any headaches or nausea vomiting.  Presents for evaluation status post fall.    PCP: Praveena Cueva APRN - NP    No current facility-administered medications for this encounter.     Current Outpatient Medications   Medication Sig Dispense Refill    amLODIPine (NORVASC) 5 MG tablet Take 1 tablet by mouth daily 90 tablet 0    vitamin D (ERGOCALCIFEROL) 1.25 MG (62847 UT) CAPS capsule Take 1 capsule by mouth once a week 12 capsule 1    acetaminophen (TYLENOL) 500 MG tablet Take 1 tablet by mouth 4 times daily as needed for Pain 30 tablet 0    diclofenac (VOLTAREN) 75 MG EC tablet Take 1 tablet by mouth 2 times daily 60 tablet 5    famotidine (PEPCID) 40 MG tablet Take 1 tablet by mouth 2 times daily as needed (reflux symptoms) 60 tablet 2    losartan (COZAAR) 50 MG tablet TAKE 1 TABLET BY MOUTH EVERY DAY 90 tablet 3    hydroCHLOROthiazide (MICROZIDE) 12.5 MG capsule Take 1 capsule by mouth every morning 30 capsule 2    diclofenac sodium (VOLTAREN) 1 % GEL Apply 2 g topically 4 times daily      metFORMIN (GLUCOPHAGE-XR) 500 MG extended release tablet Take 1 tablet by mouth Daily with supper      triamcinolone (ARISTOCORT) 0.5 % cream Apply 2 g

## 2024-06-24 NOTE — ED TRIAGE NOTES
Pt arrives via EMS c/o fall on wet floor today. No LOC. No blood thinners. Pt ambulatory with assist. A&Ox4. No deformities notes.

## 2024-07-24 DIAGNOSIS — I10 ESSENTIAL (PRIMARY) HYPERTENSION: ICD-10-CM

## 2024-07-25 ENCOUNTER — OFFICE VISIT (OUTPATIENT)
Facility: CLINIC | Age: 69
End: 2024-07-25
Payer: MEDICARE

## 2024-07-25 VITALS
RESPIRATION RATE: 20 BRPM | DIASTOLIC BLOOD PRESSURE: 84 MMHG | BODY MASS INDEX: 24.36 KG/M2 | OXYGEN SATURATION: 99 % | WEIGHT: 151.6 LBS | HEART RATE: 86 BPM | HEIGHT: 66 IN | SYSTOLIC BLOOD PRESSURE: 158 MMHG | TEMPERATURE: 97.7 F

## 2024-07-25 DIAGNOSIS — I10 ESSENTIAL (PRIMARY) HYPERTENSION: Primary | ICD-10-CM

## 2024-07-25 DIAGNOSIS — R29.6 RECURRENT FALLS WHILE WALKING: ICD-10-CM

## 2024-07-25 DIAGNOSIS — Z12.11 SCREENING FOR COLORECTAL CANCER: ICD-10-CM

## 2024-07-25 DIAGNOSIS — Z12.12 SCREENING FOR COLORECTAL CANCER: ICD-10-CM

## 2024-07-25 DIAGNOSIS — Z78.0 ASYMPTOMATIC MENOPAUSE: ICD-10-CM

## 2024-07-25 DIAGNOSIS — R29.898 WEAKNESS OF LEFT LOWER EXTREMITY: ICD-10-CM

## 2024-07-25 PROCEDURE — 3078F DIAST BP <80 MM HG: CPT | Performed by: NURSE PRACTITIONER

## 2024-07-25 PROCEDURE — 1123F ACP DISCUSS/DSCN MKR DOCD: CPT | Performed by: NURSE PRACTITIONER

## 2024-07-25 PROCEDURE — 99214 OFFICE O/P EST MOD 30 MIN: CPT | Performed by: NURSE PRACTITIONER

## 2024-07-25 PROCEDURE — 3077F SYST BP >= 140 MM HG: CPT | Performed by: NURSE PRACTITIONER

## 2024-07-25 RX ORDER — LOSARTAN POTASSIUM 50 MG/1
TABLET ORAL
Qty: 90 TABLET | Refills: 1 | Status: SHIPPED | OUTPATIENT
Start: 2024-07-25

## 2024-07-25 RX ORDER — AMLODIPINE BESYLATE 5 MG/1
5 TABLET ORAL DAILY
Qty: 90 TABLET | Refills: 0 | Status: SHIPPED | OUTPATIENT
Start: 2024-07-25

## 2024-07-25 RX ORDER — LOSARTAN POTASSIUM 50 MG/1
50 TABLET ORAL DAILY
Qty: 90 TABLET | Refills: 1 | Status: CANCELLED | OUTPATIENT
Start: 2024-07-25

## 2024-07-25 SDOH — ECONOMIC STABILITY: FOOD INSECURITY: WITHIN THE PAST 12 MONTHS, YOU WORRIED THAT YOUR FOOD WOULD RUN OUT BEFORE YOU GOT MONEY TO BUY MORE.: PATIENT DECLINED

## 2024-07-25 SDOH — ECONOMIC STABILITY: HOUSING INSECURITY
IN THE LAST 12 MONTHS, WAS THERE A TIME WHEN YOU DID NOT HAVE A STEADY PLACE TO SLEEP OR SLEPT IN A SHELTER (INCLUDING NOW)?: PATIENT DECLINED

## 2024-07-25 SDOH — ECONOMIC STABILITY: FOOD INSECURITY: WITHIN THE PAST 12 MONTHS, THE FOOD YOU BOUGHT JUST DIDN'T LAST AND YOU DIDN'T HAVE MONEY TO GET MORE.: PATIENT DECLINED

## 2024-07-25 SDOH — ECONOMIC STABILITY: INCOME INSECURITY: HOW HARD IS IT FOR YOU TO PAY FOR THE VERY BASICS LIKE FOOD, HOUSING, MEDICAL CARE, AND HEATING?: PATIENT DECLINED

## 2024-07-25 ASSESSMENT — PATIENT HEALTH QUESTIONNAIRE - PHQ9: DEPRESSION UNABLE TO ASSESS: PT REFUSES

## 2024-07-25 NOTE — PROGRESS NOTES
1. \"Have you been to the ER, urgent care clinic since your last visit?  Hospitalized since your last visit?\"  [facility] and [facility]    2. \"Have you seen or consulted any other health care providers outside of the [facility] since your last visit?\" No     3. For patients aged 45-75: Has the patient had a colonoscopy / FIT/ Cologuard? No      If the patient is female:    4. For patients aged 40-74: Has the patient had a mammogram within the past 2 years? Yes - Care Gap present. Most recent result on file      5. For patients aged 21-65: Has the patient had a pap smear? NA - based on age or sex

## 2024-07-25 NOTE — PROGRESS NOTES
Justine Velasco (:  1955) is a 68 y.o. female, Established patient, here for evaluation of the following chief complaint(s):  ED Follow-up       Assessment & Plan  1. Falls.  The patient is advised to utilize her walker or cane for mobility. A home health evaluation will be arranged to assess her hand and leg weakness. A referral for home physical therapy will be initiated.    2. Hypertension.  The patient's losartan was discontinued due to associated dizziness. The patient is advised to continue her amlodipine regimen.    Follow-up  A follow-up appointment is scheduled for 1 month from now.    Results    Justine was seen today for ed follow-up.    Diagnoses and all orders for this visit:    Essential (primary) hypertension    Recurrent falls while walking  -     Shower chair  -     Ambulatory referral to Physicial Medicine Rehab    Weakness of left lower extremity  -     Shower chair  -     Ambulatory referral to Physicial Medicine Rehab    Asymptomatic menopause  -     DEXA BONE DENSITY AXIAL SKELETON; Future    Screening for colorectal cancer  -     Amb External Referral To Gastroenterology    Other orders  -     amLODIPine (NORVASC) 5 MG tablet; Take 1 tablet by mouth daily      orders and follow up as documented in EpicCare, lab results reviewed with patient    I have discussed the diagnosis with the patient and the intended plan as seen in the above orders.  The patient has received an after-visit summary and questions were answered concerning future plans.  I have discussed medication side effects and warnings with the patient as well. Patient agreeable with above plan and verbalizes understanding.  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  Wed and Thur [id number].    Return in about 4 weeks (around 2024) for DM/HTN/HLD, fasting labs 1 week prior, in office ONLY.    Subjective   History of Present Illness  The patient presents for evaluation

## 2024-08-11 DIAGNOSIS — M54.16 LUMBAR RADICULOPATHY: ICD-10-CM

## 2024-08-11 DIAGNOSIS — M47.816 LUMBAR SPONDYLOSIS: ICD-10-CM

## 2024-08-12 RX ORDER — DICLOFENAC SODIUM 75 MG/1
75 TABLET, DELAYED RELEASE ORAL 2 TIMES DAILY
Qty: 60 TABLET | Refills: 5 | OUTPATIENT
Start: 2024-08-12

## 2024-08-16 LAB
25(OH)D3+25(OH)D2 SERPL-MCNC: 19.5 NG/ML (ref 30–100)
ALBUMIN SERPL-MCNC: 4.6 G/DL (ref 3.9–4.9)
ALP SERPL-CCNC: 92 IU/L (ref 44–121)
ALT SERPL-CCNC: 22 IU/L (ref 0–32)
AST SERPL-CCNC: 19 IU/L (ref 0–40)
BASOPHILS # BLD AUTO: 0.1 X10E3/UL (ref 0–0.2)
BASOPHILS NFR BLD AUTO: 1 %
BILIRUB SERPL-MCNC: 0.5 MG/DL (ref 0–1.2)
BUN SERPL-MCNC: 18 MG/DL (ref 8–27)
BUN/CREAT SERPL: 29 (ref 12–28)
CALCIUM SERPL-MCNC: 10.2 MG/DL (ref 8.7–10.3)
CHLORIDE SERPL-SCNC: 103 MMOL/L (ref 96–106)
CHOLEST SERPL-MCNC: 217 MG/DL (ref 100–199)
CO2 SERPL-SCNC: 23 MMOL/L (ref 20–29)
CREAT SERPL-MCNC: 0.63 MG/DL (ref 0.57–1)
EGFRCR SERPLBLD CKD-EPI 2021: 97 ML/MIN/1.73
EOSINOPHIL # BLD AUTO: 0.1 X10E3/UL (ref 0–0.4)
EOSINOPHIL NFR BLD AUTO: 1 %
ERYTHROCYTE [DISTWIDTH] IN BLOOD BY AUTOMATED COUNT: 14.4 % (ref 11.7–15.4)
GLOBULIN SER CALC-MCNC: 3.3 G/DL (ref 1.5–4.5)
GLUCOSE SERPL-MCNC: 108 MG/DL (ref 70–99)
HBA1C MFR BLD: 6.2 % (ref 4.8–5.6)
HCT VFR BLD AUTO: 45.5 % (ref 34–46.6)
HDLC SERPL-MCNC: 52 MG/DL
HGB BLD-MCNC: 14 G/DL (ref 11.1–15.9)
IMM GRANULOCYTES # BLD AUTO: 0 X10E3/UL (ref 0–0.1)
IMM GRANULOCYTES NFR BLD AUTO: 0 %
LDLC SERPL CALC-MCNC: 145 MG/DL (ref 0–99)
LYMPHOCYTES # BLD AUTO: 3.1 X10E3/UL (ref 0.7–3.1)
LYMPHOCYTES NFR BLD AUTO: 37 %
MCH RBC QN AUTO: 26.1 PG (ref 26.6–33)
MCHC RBC AUTO-ENTMCNC: 30.8 G/DL (ref 31.5–35.7)
MCV RBC AUTO: 85 FL (ref 79–97)
MONOCYTES # BLD AUTO: 0.3 X10E3/UL (ref 0.1–0.9)
MONOCYTES NFR BLD AUTO: 3 %
NEUTROPHILS # BLD AUTO: 4.9 X10E3/UL (ref 1.4–7)
NEUTROPHILS NFR BLD AUTO: 58 %
PLATELET # BLD AUTO: 352 X10E3/UL (ref 150–450)
POTASSIUM SERPL-SCNC: 4.3 MMOL/L (ref 3.5–5.2)
PROT SERPL-MCNC: 7.9 G/DL (ref 6–8.5)
RBC # BLD AUTO: 5.36 X10E6/UL (ref 3.77–5.28)
SODIUM SERPL-SCNC: 141 MMOL/L (ref 134–144)
TRIGL SERPL-MCNC: 112 MG/DL (ref 0–149)
VLDLC SERPL CALC-MCNC: 20 MG/DL (ref 5–40)
WBC # BLD AUTO: 8.4 X10E3/UL (ref 3.4–10.8)

## 2024-08-22 ENCOUNTER — OFFICE VISIT (OUTPATIENT)
Facility: CLINIC | Age: 69
End: 2024-08-22
Payer: MEDICARE

## 2024-08-22 VITALS
DIASTOLIC BLOOD PRESSURE: 87 MMHG | OXYGEN SATURATION: 96 % | TEMPERATURE: 98 F | SYSTOLIC BLOOD PRESSURE: 148 MMHG | HEART RATE: 83 BPM | BODY MASS INDEX: 24.11 KG/M2 | WEIGHT: 150 LBS | HEIGHT: 66 IN | RESPIRATION RATE: 18 BRPM

## 2024-08-22 DIAGNOSIS — I10 ESSENTIAL (PRIMARY) HYPERTENSION: ICD-10-CM

## 2024-08-22 DIAGNOSIS — I10 PRIMARY HYPERTENSION: ICD-10-CM

## 2024-08-22 DIAGNOSIS — E11.65 TYPE 2 DIABETES MELLITUS WITH HYPERGLYCEMIA, WITHOUT LONG-TERM CURRENT USE OF INSULIN (HCC): ICD-10-CM

## 2024-08-22 DIAGNOSIS — Z00.00 MEDICARE ANNUAL WELLNESS VISIT, SUBSEQUENT: Primary | ICD-10-CM

## 2024-08-22 DIAGNOSIS — E55.9 HYPOVITAMINOSIS D: ICD-10-CM

## 2024-08-22 DIAGNOSIS — Z12.31 SCREENING MAMMOGRAM FOR BREAST CANCER: ICD-10-CM

## 2024-08-22 PROCEDURE — 3077F SYST BP >= 140 MM HG: CPT | Performed by: NURSE PRACTITIONER

## 2024-08-22 PROCEDURE — 3079F DIAST BP 80-89 MM HG: CPT | Performed by: NURSE PRACTITIONER

## 2024-08-22 PROCEDURE — G0439 PPPS, SUBSEQ VISIT: HCPCS | Performed by: NURSE PRACTITIONER

## 2024-08-22 PROCEDURE — 99214 OFFICE O/P EST MOD 30 MIN: CPT | Performed by: NURSE PRACTITIONER

## 2024-08-22 PROCEDURE — 1123F ACP DISCUSS/DSCN MKR DOCD: CPT | Performed by: NURSE PRACTITIONER

## 2024-08-22 PROCEDURE — 3044F HG A1C LEVEL LT 7.0%: CPT | Performed by: NURSE PRACTITIONER

## 2024-08-22 RX ORDER — LOSARTAN POTASSIUM 100 MG/1
100 TABLET ORAL DAILY
Qty: 90 TABLET | Refills: 2 | Status: SHIPPED | OUTPATIENT
Start: 2024-08-22

## 2024-08-22 RX ORDER — ERGOCALCIFEROL 1.25 MG/1
50000 CAPSULE, LIQUID FILLED ORAL WEEKLY
Qty: 12 CAPSULE | Refills: 1 | Status: SHIPPED | OUTPATIENT
Start: 2024-08-22

## 2024-08-22 ASSESSMENT — PATIENT HEALTH QUESTIONNAIRE - PHQ9
SUM OF ALL RESPONSES TO PHQ QUESTIONS 1-9: 0
2. FEELING DOWN, DEPRESSED OR HOPELESS: NOT AT ALL
SUM OF ALL RESPONSES TO PHQ QUESTIONS 1-9: 0
1. LITTLE INTEREST OR PLEASURE IN DOING THINGS: NOT AT ALL
SUM OF ALL RESPONSES TO PHQ9 QUESTIONS 1 & 2: 0

## 2024-09-09 ENCOUNTER — NURSE ONLY (OUTPATIENT)
Facility: CLINIC | Age: 69
End: 2024-09-09

## 2024-09-09 VITALS
OXYGEN SATURATION: 96 % | SYSTOLIC BLOOD PRESSURE: 159 MMHG | RESPIRATION RATE: 18 BRPM | HEART RATE: 93 BPM | DIASTOLIC BLOOD PRESSURE: 84 MMHG

## 2024-09-13 ENCOUNTER — HOSPITAL ENCOUNTER (OUTPATIENT)
Facility: HOSPITAL | Age: 69
Discharge: HOME | End: 2024-09-16
Payer: MEDICARE

## 2024-09-13 VITALS — WEIGHT: 160 LBS | HEIGHT: 66 IN | BODY MASS INDEX: 25.71 KG/M2

## 2024-09-13 DIAGNOSIS — Z12.31 SCREENING MAMMOGRAM FOR BREAST CANCER: ICD-10-CM

## 2024-09-13 PROCEDURE — 77063 BREAST TOMOSYNTHESIS BI: CPT

## 2024-09-14 DIAGNOSIS — M54.16 LUMBAR RADICULOPATHY: ICD-10-CM

## 2024-09-14 DIAGNOSIS — M47.816 LUMBAR SPONDYLOSIS: ICD-10-CM

## 2024-09-23 ENCOUNTER — NURSE ONLY (OUTPATIENT)
Facility: CLINIC | Age: 69
End: 2024-09-23

## 2024-09-23 DIAGNOSIS — I10 PRIMARY HYPERTENSION: Primary | ICD-10-CM

## 2024-09-26 ENCOUNTER — OFFICE VISIT (OUTPATIENT)
Age: 69
End: 2024-09-26
Payer: MEDICARE

## 2024-09-26 VITALS — BODY MASS INDEX: 25.82 KG/M2 | OXYGEN SATURATION: 99 % | HEART RATE: 88 BPM | HEIGHT: 66 IN | TEMPERATURE: 97.5 F

## 2024-09-26 DIAGNOSIS — M54.16 LUMBAR RADICULOPATHY: ICD-10-CM

## 2024-09-26 DIAGNOSIS — M47.816 LUMBAR SPONDYLOSIS: ICD-10-CM

## 2024-09-26 PROCEDURE — 99213 OFFICE O/P EST LOW 20 MIN: CPT | Performed by: NURSE PRACTITIONER

## 2024-09-26 PROCEDURE — 1123F ACP DISCUSS/DSCN MKR DOCD: CPT | Performed by: NURSE PRACTITIONER

## 2024-09-26 RX ORDER — DICLOFENAC SODIUM 75 MG/1
75 TABLET, DELAYED RELEASE ORAL 2 TIMES DAILY
Qty: 60 TABLET | Refills: 5 | Status: SHIPPED | OUTPATIENT
Start: 2024-09-26

## 2024-09-26 RX ORDER — DICLOFENAC SODIUM 75 MG/1
75 TABLET, DELAYED RELEASE ORAL 2 TIMES DAILY
Qty: 60 TABLET | Refills: 5 | OUTPATIENT
Start: 2024-09-26

## 2024-10-09 ENCOUNTER — TELEPHONE (OUTPATIENT)
Facility: CLINIC | Age: 69
End: 2024-10-09

## 2024-10-09 DIAGNOSIS — R92.8 ABNORMAL MAMMOGRAM: Primary | ICD-10-CM

## 2024-10-09 NOTE — TELEPHONE ENCOUNTER
Bilateral diagnostic mammogram   Bilateral breast ultrasound   Dx abnormal mammogram   Per verbal order Praveena Cueva NP-C

## 2024-10-09 NOTE — TELEPHONE ENCOUNTER
ollow-Ups    Needs Follow-Up    Recommendation Side Recommended by Due   Additional Imaging Bilateral Cammie Watts 9/20/2024        Address Recommendation

## 2024-10-09 NOTE — TELEPHONE ENCOUNTER
Tried calling patient but patient did not answer. Left patient a voice message to contact our office.

## 2024-10-10 ENCOUNTER — TELEPHONE (OUTPATIENT)
Facility: CLINIC | Age: 69
End: 2024-10-10

## 2024-10-10 NOTE — TELEPHONE ENCOUNTER
Tried calling patient to inform her of her mammogram results and additional images are needed. Patient did not answer. Left patient a voice message to contact the office.    Bilateral diagnostic mammogram  Bilateral breast ultrasound  Dx abnormal mammogram  Per verbal order Praveena ATKINSC

## 2024-12-12 ENCOUNTER — HOSPITAL ENCOUNTER (INPATIENT)
Facility: HOSPITAL | Age: 69
LOS: 5 days | Discharge: NURSING, REHAB OR LONG-TERM CARE | DRG: 948 | End: 2024-12-18
Attending: EMERGENCY MEDICINE | Admitting: STUDENT IN AN ORGANIZED HEALTH CARE EDUCATION/TRAINING PROGRAM
Payer: MEDICARE

## 2024-12-12 ENCOUNTER — APPOINTMENT (OUTPATIENT)
Facility: HOSPITAL | Age: 69
DRG: 948 | End: 2024-12-12
Payer: MEDICARE

## 2024-12-12 DIAGNOSIS — I63.9 CEREBROVASCULAR ACCIDENT (CVA), UNSPECIFIED MECHANISM (HCC): ICD-10-CM

## 2024-12-12 DIAGNOSIS — G70.9 NEUROMUSCULAR WEAKNESS (HCC): ICD-10-CM

## 2024-12-12 DIAGNOSIS — S89.92XA LEFT LEG INJURY, INITIAL ENCOUNTER: ICD-10-CM

## 2024-12-12 DIAGNOSIS — R29.898 WEAKNESS OF BOTH LOWER EXTREMITIES: ICD-10-CM

## 2024-12-12 DIAGNOSIS — R26.9 GAIT DISORDER: ICD-10-CM

## 2024-12-12 DIAGNOSIS — R29.6 FREQUENT FALLS: Primary | ICD-10-CM

## 2024-12-12 LAB
ALBUMIN SERPL-MCNC: 3.6 G/DL (ref 3.4–5)
ALBUMIN/GLOB SERPL: 0.7 (ref 0.8–1.7)
ALP SERPL-CCNC: 86 U/L (ref 45–117)
ALT SERPL-CCNC: 18 U/L (ref 13–56)
ANION GAP SERPL CALC-SCNC: 8 MMOL/L (ref 3–18)
AST SERPL-CCNC: 16 U/L (ref 10–38)
BASOPHILS # BLD: 0 K/UL (ref 0–0.1)
BASOPHILS NFR BLD: 0 % (ref 0–2)
BILIRUB SERPL-MCNC: 1 MG/DL (ref 0.2–1)
BUN SERPL-MCNC: 28 MG/DL (ref 7–18)
BUN/CREAT SERPL: 40 (ref 12–20)
CALCIUM SERPL-MCNC: 10.5 MG/DL (ref 8.5–10.1)
CHLORIDE SERPL-SCNC: 107 MMOL/L (ref 100–111)
CO2 SERPL-SCNC: 28 MMOL/L (ref 21–32)
CREAT SERPL-MCNC: 0.7 MG/DL (ref 0.6–1.3)
DIFFERENTIAL METHOD BLD: ABNORMAL
EOSINOPHIL # BLD: 0 K/UL (ref 0–0.4)
EOSINOPHIL NFR BLD: 0 % (ref 0–5)
ERYTHROCYTE [DISTWIDTH] IN BLOOD BY AUTOMATED COUNT: 13.9 % (ref 11.6–14.5)
GLOBULIN SER CALC-MCNC: 5 G/DL (ref 2–4)
GLUCOSE SERPL-MCNC: 107 MG/DL (ref 74–99)
HCT VFR BLD AUTO: 49.9 % (ref 35–45)
HGB BLD-MCNC: 15.5 G/DL (ref 12–16)
IMM GRANULOCYTES # BLD AUTO: 0.1 K/UL (ref 0–0.04)
IMM GRANULOCYTES NFR BLD AUTO: 0 % (ref 0–0.5)
LYMPHOCYTES # BLD: 1.5 K/UL (ref 0.9–3.6)
LYMPHOCYTES NFR BLD: 13 % (ref 21–52)
MCH RBC QN AUTO: 25.7 PG (ref 24–34)
MCHC RBC AUTO-ENTMCNC: 31.1 G/DL (ref 31–37)
MCV RBC AUTO: 82.9 FL (ref 78–100)
MONOCYTES # BLD: 0.5 K/UL (ref 0.05–1.2)
MONOCYTES NFR BLD: 4 % (ref 3–10)
NEUTS SEG # BLD: 9.6 K/UL (ref 1.8–8)
NEUTS SEG NFR BLD: 83 % (ref 40–73)
NRBC # BLD: 0 K/UL (ref 0–0.01)
NRBC BLD-RTO: 0 PER 100 WBC
PLATELET # BLD AUTO: 314 K/UL (ref 135–420)
PMV BLD AUTO: 12 FL (ref 9.2–11.8)
POTASSIUM SERPL-SCNC: 3.7 MMOL/L (ref 3.5–5.5)
PROT SERPL-MCNC: 8.6 G/DL (ref 6.4–8.2)
RBC # BLD AUTO: 6.02 M/UL (ref 4.2–5.3)
SODIUM SERPL-SCNC: 143 MMOL/L (ref 136–145)
WBC # BLD AUTO: 11.7 K/UL (ref 4.6–13.2)

## 2024-12-12 PROCEDURE — 85025 COMPLETE CBC W/AUTO DIFF WBC: CPT

## 2024-12-12 PROCEDURE — 80053 COMPREHEN METABOLIC PANEL: CPT

## 2024-12-12 PROCEDURE — 94761 N-INVAS EAR/PLS OXIMETRY MLT: CPT

## 2024-12-12 PROCEDURE — 99285 EMERGENCY DEPT VISIT HI MDM: CPT

## 2024-12-12 PROCEDURE — 73552 X-RAY EXAM OF FEMUR 2/>: CPT

## 2024-12-12 RX ORDER — LOSARTAN POTASSIUM 50 MG/1
100 TABLET ORAL DAILY
Status: DISCONTINUED | OUTPATIENT
Start: 2024-12-13 | End: 2024-12-18 | Stop reason: HOSPADM

## 2024-12-12 RX ORDER — FAMOTIDINE 20 MG/1
40 TABLET, FILM COATED ORAL 2 TIMES DAILY
Status: DISCONTINUED | OUTPATIENT
Start: 2024-12-13 | End: 2024-12-14

## 2024-12-12 ASSESSMENT — PAIN DESCRIPTION - LOCATION: LOCATION: SACRUM

## 2024-12-12 ASSESSMENT — PAIN SCALES - GENERAL: PAINLEVEL_OUTOF10: 8

## 2024-12-12 ASSESSMENT — PAIN - FUNCTIONAL ASSESSMENT: PAIN_FUNCTIONAL_ASSESSMENT: 0-10

## 2024-12-12 NOTE — ED TRIAGE NOTES
Patient arrives to ED from home with report of left leg pain, son also states she is not eating well at home.

## 2024-12-12 NOTE — ED PROVIDER NOTES
EMERGENCY DEPARTMENT HISTORY AND PHYSICAL EXAM    Date: 12/12/2024  Patient Name: Justine Velasco    History of Presenting Illness     Chief Complaint   Patient presents with    Leg Pain         History Provided By: Patient       Additional History (Context): Justine Velasco is a 68 y.o. female with a history of hypertension, venous insufficiency and lymphedema who presents today for fall.  Patient reports that her living situation is her main concern.  Reports that she has 10 steps that she has to go up and down every day and does not feel safe doing so.  Is inquiring about rehab to increase her strength.  States she has multiple walking devices at home including a cane and a walker however despite using her walker last night she lost her footing and fell in front of her bed.  Reports that her son helped her get up and sit in a chair.  Reports she has been having left thigh pain since and came in for evaluation.  Patient denies hitting her head or LOC.  Has no other complaints or concerns at this time.      PCP: Praveena Cueva, MARIBEL - NP    No current facility-administered medications for this encounter.     Current Outpatient Medications   Medication Sig Dispense Refill    diclofenac (VOLTAREN) 75 MG EC tablet Take 1 tablet by mouth 2 times daily 60 tablet 5    vitamin D (ERGOCALCIFEROL) 1.25 MG (98727 UT) CAPS capsule Take 1 capsule by mouth once a week 12 capsule 1    losartan (COZAAR) 100 MG tablet Take 1 tablet by mouth daily 90 tablet 2    acetaminophen (TYLENOL) 500 MG tablet Take 1 tablet by mouth 4 times daily as needed for Pain 30 tablet 0    famotidine (PEPCID) 40 MG tablet Take 1 tablet by mouth 2 times daily as needed (reflux symptoms) 60 tablet 2    diclofenac sodium (VOLTAREN) 1 % GEL Apply 2 g topically 4 times daily as needed      metFORMIN (GLUCOPHAGE-XR) 500 MG extended release tablet Take 1 tablet by mouth Daily with supper      triamcinolone (ARISTOCORT) 0.5 % cream Apply 2 g topically 2 times  tablet  Commonly known as: COZAAR  Take 1 tablet by mouth daily     metFORMIN 500 MG extended release tablet  Commonly known as: GLUCOPHAGE-XR     triamcinolone 0.5 % cream  Commonly known as: ARISTOCORT     vitamin D 1.25 MG (90132 UT) Caps capsule  Commonly known as: ERGOCALCIFEROL  Take 1 capsule by mouth once a week                  Diagnosis     Clinical Impression:   1. Frequent falls    2. Left leg injury, initial encounter          \"Please note that this dictation was completed with [employer], the computer voice recognition software. Quite often unanticipated grammatical, syntax, homophones, and other interpretive errors are inadvertently transcribed by the computer software. Please disregard these errors. Please excuse any errors that have escaped final proofreading.\"         Codie Elizalde PA  12/12/24 1800

## 2024-12-12 NOTE — CASE COMMUNICATION
CM met with pt at the bedside, states has had frequent falls. Pt has [employer] medicare, will PT/OT for auth.    iTffany TUTTLEN RN  Case Management  [phone]

## 2024-12-12 NOTE — ED NOTES
Patient reports fall to sacrum and injured left leg, c/o tendonitis to LLE, patient lives with  and son, states she has had weight loss and decreased appetite

## 2024-12-12 NOTE — ED NOTES
6:03 PM Assumed care of the patient at this time. Discussed with CHEYENNE Elizalde concerning patient Justine Velasco, standard discussion of reason for visit, HPI, ROS, PE, and current results available.  Recommendation for case management consultation/recommendation to dispo. PT/OT consults placed as well given pt is experiencing frequent mechanical falls and difficulty with ADLs.     Rahel Hairston PA-C      9:20 PM pt is resting comfortably. No apparent distress. Daily meds and diet order placed. Pt will be turned over to night ED attending  who will assume care of the pt following completion of my shift. Rahel Hairston PA-C       Disposition: TBD     Dictation disclaimer:  Please note that this dictation was completed with [employer], the computer voice recognition software.  Quite often unanticipated grammatical, syntax, homophones, and other interpretive errors are inadvertently transcribed by the computer software.  Please disregard these errors.  Please excuse any errors that have escaped final proofreading.       Rahel Hairston PA-C  12/13/24 0158

## 2024-12-13 ENCOUNTER — APPOINTMENT (OUTPATIENT)
Facility: HOSPITAL | Age: 69
DRG: 948 | End: 2024-12-13
Payer: MEDICARE

## 2024-12-13 PROBLEM — G70.9 NEUROMUSCULAR WEAKNESS (HCC): Status: ACTIVE | Noted: 2024-12-13

## 2024-12-13 PROBLEM — R26.9 GAIT DISORDER: Status: ACTIVE | Noted: 2024-12-13

## 2024-12-13 PROBLEM — R26.9 GAIT DISORDER: Status: RESOLVED | Noted: 2024-12-13 | Resolved: 2024-12-13

## 2024-12-13 PROBLEM — E83.52 HYPERCALCEMIA: Status: ACTIVE | Noted: 2024-12-13

## 2024-12-13 LAB
APPEARANCE UR: ABNORMAL
BACTERIA URNS QL MICRO: ABNORMAL /HPF
BILIRUB UR QL: ABNORMAL
CAOX CRY URNS QL MICRO: ABNORMAL
COLOR UR: ABNORMAL
EPITH CASTS URNS QL MICRO: ABNORMAL /LPF (ref 0–5)
GLUCOSE BLD STRIP.AUTO-MCNC: 103 MG/DL (ref 70–110)
GLUCOSE BLD STRIP.AUTO-MCNC: 144 MG/DL (ref 70–110)
GLUCOSE UR STRIP.AUTO-MCNC: NEGATIVE MG/DL
HGB UR QL STRIP: NEGATIVE
KETONES UR QL STRIP.AUTO: 15 MG/DL
LEUKOCYTE ESTERASE UR QL STRIP.AUTO: ABNORMAL
MUCOUS THREADS URNS QL MICRO: ABNORMAL /LPF
NITRITE UR QL STRIP.AUTO: NEGATIVE
PH UR STRIP: 5.5 (ref 5–8)
PROT UR STRIP-MCNC: 30 MG/DL
RBC #/AREA URNS HPF: ABNORMAL /HPF (ref 0–5)
SP GR UR REFRACTOMETRY: >1.03 (ref 1–1.04)
UROBILINOGEN UR QL STRIP.AUTO: 1 EU/DL (ref 0.2–1)
WBC URNS QL MICRO: ABNORMAL /HPF (ref 0–5)

## 2024-12-13 PROCEDURE — 99221 1ST HOSP IP/OBS SF/LOW 40: CPT | Performed by: STUDENT IN AN ORGANIZED HEALTH CARE EDUCATION/TRAINING PROGRAM

## 2024-12-13 PROCEDURE — 82962 GLUCOSE BLOOD TEST: CPT

## 2024-12-13 PROCEDURE — 94761 N-INVAS EAR/PLS OXIMETRY MLT: CPT

## 2024-12-13 PROCEDURE — 74176 CT ABD & PELVIS W/O CONTRAST: CPT

## 2024-12-13 PROCEDURE — 97166 OT EVAL MOD COMPLEX 45 MIN: CPT

## 2024-12-13 PROCEDURE — 6370000000 HC RX 637 (ALT 250 FOR IP): Performed by: PHYSICIAN ASSISTANT

## 2024-12-13 PROCEDURE — 70450 CT HEAD/BRAIN W/O DYE: CPT

## 2024-12-13 PROCEDURE — 97535 SELF CARE MNGMENT TRAINING: CPT

## 2024-12-13 PROCEDURE — 97530 THERAPEUTIC ACTIVITIES: CPT

## 2024-12-13 PROCEDURE — 6370000000 HC RX 637 (ALT 250 FOR IP): Performed by: STUDENT IN AN ORGANIZED HEALTH CARE EDUCATION/TRAINING PROGRAM

## 2024-12-13 PROCEDURE — 2580000003 HC RX 258: Performed by: STUDENT IN AN ORGANIZED HEALTH CARE EDUCATION/TRAINING PROGRAM

## 2024-12-13 PROCEDURE — 97162 PT EVAL MOD COMPLEX 30 MIN: CPT

## 2024-12-13 PROCEDURE — 99222 1ST HOSP IP/OBS MODERATE 55: CPT | Performed by: PSYCHIATRY & NEUROLOGY

## 2024-12-13 PROCEDURE — 1100000003 HC PRIVATE W/ TELEMETRY

## 2024-12-13 PROCEDURE — 97116 GAIT TRAINING THERAPY: CPT

## 2024-12-13 PROCEDURE — 81001 URINALYSIS AUTO W/SCOPE: CPT

## 2024-12-13 RX ORDER — ENOXAPARIN SODIUM 100 MG/ML
40 INJECTION SUBCUTANEOUS DAILY
Status: DISCONTINUED | OUTPATIENT
Start: 2024-12-13 | End: 2024-12-18 | Stop reason: HOSPADM

## 2024-12-13 RX ORDER — INSULIN GLARGINE 100 [IU]/ML
10 INJECTION, SOLUTION SUBCUTANEOUS NIGHTLY
Status: DISCONTINUED | OUTPATIENT
Start: 2024-12-13 | End: 2024-12-18 | Stop reason: HOSPADM

## 2024-12-13 RX ORDER — SODIUM CHLORIDE 9 MG/ML
INJECTION, SOLUTION INTRAVENOUS PRN
Status: DISCONTINUED | OUTPATIENT
Start: 2024-12-13 | End: 2024-12-18 | Stop reason: HOSPADM

## 2024-12-13 RX ORDER — POTASSIUM CHLORIDE 1500 MG/1
40 TABLET, EXTENDED RELEASE ORAL PRN
Status: DISCONTINUED | OUTPATIENT
Start: 2024-12-13 | End: 2024-12-18 | Stop reason: HOSPADM

## 2024-12-13 RX ORDER — MAGNESIUM SULFATE IN WATER 40 MG/ML
2000 INJECTION, SOLUTION INTRAVENOUS PRN
Status: DISCONTINUED | OUTPATIENT
Start: 2024-12-13 | End: 2024-12-18 | Stop reason: HOSPADM

## 2024-12-13 RX ORDER — ONDANSETRON 4 MG/1
4 TABLET, ORALLY DISINTEGRATING ORAL EVERY 8 HOURS PRN
Status: DISCONTINUED | OUTPATIENT
Start: 2024-12-13 | End: 2024-12-18 | Stop reason: HOSPADM

## 2024-12-13 RX ORDER — POTASSIUM CHLORIDE 7.45 MG/ML
10 INJECTION INTRAVENOUS PRN
Status: DISCONTINUED | OUTPATIENT
Start: 2024-12-13 | End: 2024-12-18 | Stop reason: HOSPADM

## 2024-12-13 RX ORDER — ACETAMINOPHEN 650 MG/1
650 SUPPOSITORY RECTAL EVERY 6 HOURS PRN
Status: DISCONTINUED | OUTPATIENT
Start: 2024-12-13 | End: 2024-12-18 | Stop reason: HOSPADM

## 2024-12-13 RX ORDER — SODIUM CHLORIDE 0.9 % (FLUSH) 0.9 %
5-40 SYRINGE (ML) INJECTION EVERY 12 HOURS SCHEDULED
Status: DISCONTINUED | OUTPATIENT
Start: 2024-12-13 | End: 2024-12-18 | Stop reason: HOSPADM

## 2024-12-13 RX ORDER — ASPIRIN 81 MG/1
81 TABLET ORAL DAILY
Status: DISCONTINUED | OUTPATIENT
Start: 2024-12-13 | End: 2024-12-18 | Stop reason: HOSPADM

## 2024-12-13 RX ORDER — POLYETHYLENE GLYCOL 3350 17 G/17G
17 POWDER, FOR SOLUTION ORAL DAILY PRN
Status: DISCONTINUED | OUTPATIENT
Start: 2024-12-13 | End: 2024-12-18 | Stop reason: HOSPADM

## 2024-12-13 RX ORDER — INSULIN LISPRO 100 [IU]/ML
0-4 INJECTION, SOLUTION INTRAVENOUS; SUBCUTANEOUS
Status: DISCONTINUED | OUTPATIENT
Start: 2024-12-13 | End: 2024-12-18 | Stop reason: HOSPADM

## 2024-12-13 RX ORDER — ACETAMINOPHEN 325 MG/1
650 TABLET ORAL EVERY 6 HOURS PRN
Status: DISCONTINUED | OUTPATIENT
Start: 2024-12-13 | End: 2024-12-18 | Stop reason: HOSPADM

## 2024-12-13 RX ORDER — DEXTROSE MONOHYDRATE 100 MG/ML
INJECTION, SOLUTION INTRAVENOUS CONTINUOUS PRN
Status: DISCONTINUED | OUTPATIENT
Start: 2024-12-13 | End: 2024-12-18 | Stop reason: HOSPADM

## 2024-12-13 RX ORDER — ONDANSETRON 2 MG/ML
4 INJECTION INTRAMUSCULAR; INTRAVENOUS EVERY 6 HOURS PRN
Status: DISCONTINUED | OUTPATIENT
Start: 2024-12-13 | End: 2024-12-18 | Stop reason: HOSPADM

## 2024-12-13 RX ORDER — SODIUM CHLORIDE 0.9 % (FLUSH) 0.9 %
5-40 SYRINGE (ML) INJECTION PRN
Status: DISCONTINUED | OUTPATIENT
Start: 2024-12-13 | End: 2024-12-18 | Stop reason: HOSPADM

## 2024-12-13 RX ADMIN — FAMOTIDINE 40 MG: 20 TABLET, FILM COATED ORAL at 21:18

## 2024-12-13 RX ADMIN — LOSARTAN POTASSIUM 100 MG: 50 TABLET, FILM COATED ORAL at 11:11

## 2024-12-13 RX ADMIN — INSULIN GLARGINE 10 UNITS: 100 INJECTION, SOLUTION SUBCUTANEOUS at 21:18

## 2024-12-13 RX ADMIN — SODIUM CHLORIDE, PRESERVATIVE FREE 10 ML: 5 INJECTION INTRAVENOUS at 21:19

## 2024-12-13 ASSESSMENT — PAIN SCALES - GENERAL
PAINLEVEL_OUTOF10: 0
PAINLEVEL_OUTOF10: 0

## 2024-12-13 NOTE — ED NOTES
Assumed care of patient. Bedside and verbal shift report given by JUSTIN Cárdenas (off going nurse) to JUSTIN Vázquez (oncoming nurse). Report included the following information SBAR and ED Summary.

## 2024-12-13 NOTE — PROGRESS NOTES
3:06 AM : Pt care transferred to me from April HAO Hairston  ,ED provider. History of patient complaint(s), available diagnostic reports and current treatment plan has been discussed thoroughly.   Bedside rounding on patient occured : No .  Intended disposition of patient : Extended Care Facility  Pending diagnostics reports and/or labs (please list):   ED Course as of 12/13/24 0307   Thu Dec 12, 2024   1758 Have discussed case with case management who is going to see and evaluate the patient and see if she is a candidate for rehab.  Patient's imaging is overall reassuring as well as her labs are reassuring.  Have also ordered PT OT per case management recommendation. [CS]   Fri Dec 13, 2024   0300 Difficulty ambulating, frequent falls, no one to help her at home, seen by Case Mgmt, working on finding a facility, UA pending [JM]      ED Course User Index  [CS] Codie Elizalde PA  [JM] Ace Singh DO     Urine microscopy pending.  Patient signed out to Dr. Cain at shift change.    Ace Singh DO  Emergency Physician  .S[facility]

## 2024-12-13 NOTE — H&P
History and Physical          Chief Complaint: Lower extremity weakness     HPI: The patient is a 68-year-old female with prediabetes mellitus and hypertension who presented to the emergency room today with complaints of bilateral lower extremity weakness which has been steadily worsening over the last 5 years.  The patient said that her weakness was \"on and off \" initially but progressed over the years.  She also said that she has \"tendinitis \".  She said that her knees sometimes buckle under her, causing her to fall.  2 days ago, she had a fall at home but did not lose consciousness.  Since then, she also developed worsening right lower extremity weakness.  The patient was also evaluated by orthopedic surgery in May 2024 and was diagnosed with a balance disorder.  The patient has a follow-up appointment with neurology next month.  The patient denied any tingling, numbness, loss of sensation, bladder/bowel incontinence, saddle anesthesia, upper extremity weakness, slurred speech or difficulty swallowing.     In the emergency room, the patient was afebrile but tachycardic and hypertensive. Lab work revealed a serum calcium level of 10.5 but was otherwise unremarkable.  Head CT without contrast showed a possible old small cortical infarct in the posterior lateral left frontal lobe and was negative for any acute intracranial process.  CT of the abdomen and pelvis reported a myomatous uterus but was otherwise unremarkable.  Neurology were consulted and they recommended inpatient admission for further evaluation and management of a gait disorder.      PMHx:  Past Medical History:   Diagnosis Date    Essential hypertension, benign     Tendinitis        PSurgHx:  Past Surgical History:   Procedure Laterality Date    APPENDECTOMY      BREAST LUMPECTOMY Right        SocialHx:  Social History     Socioeconomic History    Marital status:    Tobacco Use    Smoking status: Never     Passive exposure: Never     lower extremity weakness. Concern for CVA. COMPARISON: 24 June 2024 and 18 January 2024. TECHNIQUE: Contiguous noncontrast axial images of the brain are obtained from the foramen magnum to the vertex and reviewed in brain and bone windows. FINDINGS: There is a small focus of cortical encephalomalacia in the posterolateral opercular left frontal lobe, unchanged. No other focal abnormalities are seen. There may be some very subtle decreased attenuation in the periventricular deep cerebral white matter. There is a small focus of increased density abutting the calvarial surface at the superolateral mid left temporal lobe at the upper margin of the middle cranial fossa. This has dimensions of 10 mm AP, 8 mm transverse and 10 mm SI. This is unchanged from the prior studies.  The cerebrospinal fluid spaces are normal for age.  No other significant extra-axial abnormalities are seen.  The pituitary fossa is unremarkable.  The mastoids and visualized paranasal sinuses are clear.  The visualized orbits look grossly normal.  No significant bony abnormalities are seen.     No acute ischemic change or intracranial hemorrhage. Possible old small cortical infarct in the posterolateral opercular left frontal lobe, stable. Very subtle periventricular lucencies may reflect some chronic microvascular ischemic change. The brain is otherwise unremarkable for age. There is a small hyperdense focus abutting the calvarium at the upper lateral mid left temporal lobe. This is not clearly extra-axial but is unchanged over about a year and is unlikely to be hemorrhage; possibly a small meningioma. MRI brain would be more definitive. =================== Note: [facility] maintains that all CT scans at their facilities are performed by using dose optimization technique as appropriate to a performed examination, to include automated exposure control, adjustment of the mAs and/or kVp according to patient size (including appropriate

## 2024-12-13 NOTE — PLAN OF CARE
Problem: Occupational Therapy - Adult  Goal: By Discharge: Performs self-care activities at highest level of function for planned discharge setting.  See evaluation for individualized goals.  Description: Occupational Therapy Goals:  Initiated 12/13/2024 to be met within 7-10 days.    1.  Patient will perform bed mobility in preparation for ADLs with minimal assistance.   2.  Patient will perform lower body dressing with minimal assistance.  3.  Patient will perform grooming  with supervision/set-up standing, F balance.  4.  Patient will perform toilet transfers with minimal assistance  5.  Patient will perform all aspects of toileting with minimal assistance.  6.  Patient will participate in upper extremity therapeutic exercise/activities with supervision/set-up for 8-10 minutes to increase strength/endurance for ADLs.    7.  Patient will utilize energy conservation techniques during functional activities with verbal cues.      PLOF: Pt was modified independent with self-care and used a RW for functional mobility  Outcome: Progressing      OCCUPATIONAL THERAPY EVALUATION    Patient: Justine Velasco (68 y.o. female)  Date: 12/13/2024  Primary Diagnosis: No admission diagnoses are documented for this encounter.   Precautions: Fall Risk    ASSESSMENT :  Pt cleared to participate in OT evaluation by ED RN. Upon entering the room, the pt was supine on stretcher, alert, and agreeable to participate in OT session. Patient emotional during session d/t overall recent health decline, offered tissues for support. Patient max assist for supine > sit with HR observed at 123 bpm highest from 88 at rest. Patient total assist for donning socks, stand by assist for wiping face and simulating brushing hair and moderate assist for sit > supine. Pt reported feeling dizzy EOB, /129. Pt educated on energy conservation techniques such as pursed lip breathing and keeping eyes open with focus on steady object. Pt reported dizziness did

## 2024-12-13 NOTE — PLAN OF CARE
Problem: Physical Therapy - Adult  Goal: By Discharge: Performs mobility at highest level of function for planned discharge setting.  See evaluation for individualized goals.  Description: Physical Therapy Goals:  Initiated 12/13/2024 to be met within 7-10 days.    1.  Patient will move from supine to sit and sit to supine  in bed with contact guard assistance.    2.  Patient will transfer from bed to chair and chair to bed with minimal assistance using the least restrictive device.  3.  Patient will perform sit to stand with minimal assistance.  4.  Patient will ambulate with minimal assistance for 20 feet with the least restrictive device.   5.  Patient will ascend/descend 3 stairs with 2 handrail(s) with moderate assistance.    PLOF:  ( works during the day), 12 steps to enter, ambulates with RW, has had multiple falls in the last year     12/13/2024 1211 by Willa Loza, PT  Outcome: Progressing    PHYSICAL THERAPY EVALUATION    Patient: Justine Velasco (68 y.o. female)  Date: 12/13/2024  Primary Diagnosis: No admission diagnoses are documented for this encounter.       Precautions: Fall Risk,    ASSESSMENT :  Pt is in the ER on stretcher and agreeable to PT evaluation.  Pt required frequent cues for sequencing and body mechanics during all transfers.  Pt required mod A for supine to sit transfer with increased time and constant encouragement and pulling on RW with Ues to assist.  Pt demonstrated fair+ static sitting balance at the edge of the stretcher.  Pt stood with mod A and then sidestepped 3 feet with RW and mod A.  Pt had increased difficulty moving left foot when ambulating.  Pt returned to supine with mod A and was left on stretcher with needs in reach.    DEFICITS/IMPAIRMENTS:    , Body Structures, Functions, Activity Limitations Requiring Skilled Therapeutic Intervention: Decreased functional mobility     Patient will benefit from skilled intervention to address the above  Apartment  Home Layout: One level  Home Access: Stairs to enter with rails  Entrance Stairs - Number of Steps: 12  Bathroom Shower/Tub: Tub/Shower unit  Bathroom Toilet: Standard  Home Equipment: Cane, Walker - Rolling  Prior Level of Assist for ADLs: Needs assistance  Critical Behavior:  Orientation  Orientation Level: Oriented X4  Cognition  Overall Cognitive Status: Exceptions  Arousal/Alertness: Appears intact  Following Commands: Appears intact  Attention Span: Attends with cues to redirect  Safety Judgement: Appears intact  Insights: Fully aware of deficits  Initiation: Does not require cues  Sequencing: Requires cues for some    Strength:    Strength:  (1+ to 2-/5 B ankles, 3+/5 quads, 2/5 hips)    Tone & Sensation:   Tone: Normal     Range Of Motion:  AROM:  (grossly decreased B ankles and generally decreased hips and knees)  PROM: Within functional limits    Functional Mobility:  Bed Mobility:  Bed Mobility Training  Bed Mobility Training: Yes  Supine to Sit: Moderate assistance;Additional time  Sit to Supine: Moderate assistance  Transfers:  Transfer Training  Transfer Training: Yes  Sit to Stand: Moderate assistance  Stand to Sit: Moderate assistance  Balance:   Balance  Sitting: Impaired  Sitting - Static: Fair (occasional) (+)  Sitting - Dynamic: Fair (occasional)  Standing: With support;Impaired  Standing - Static:  (fair-)  Standing - Dynamic:  (fair-)  Ambulation/Gait Training:  Gait  Gait Training: Yes  Overall Level of Assistance: Moderate assistance  Distance (ft): 3 Feet (sidestepping along the edge of the bed)  Assistive Device: Walker, rolling  Interventions: Verbal cues;Tactile cues;Manual cues  Base of Support: Widened  Speed/Linda: Shuffled  Step Length: Right shortened;Left shortened  Gait Abnormalities: Shuffling gait     Therapeutic Exercises/Neuromuscular Re-education:   Ankle pumps, heel slides, SLR x5 with assistance;  LAQ and seated marching x5    Pain:  Intensity Pre-treatment:

## 2024-12-13 NOTE — PROGRESS NOTES
MRI screening form needs to be filled out and faxed to [phone] BEFORE MRI can be scheduled. If unable to obtain information from the patient, [facility] needs to be contacted. If patient is claustrophobic or will need pain meds, please have ordered in advance in order to facilitate exam.

## 2024-12-13 NOTE — CONSULTS
Consult Note            Date:12/13/2024        Patient Name:Justine Velasco     YOB: 1955     Age:68 y.o.    Consults    Chief Complaint     Chief Complaint   Patient presents with    Leg Pain          History Obtained From   patient    History of Present Illness   The patient has had progressive gait disorder for the past several months.  She has been having more frequent falls. She was seen June 2024 due to fall, mild head trauma.  The patient states her left leg is \"off\".  She states she has tendonitis.  She has weakness in the left leg.  She has been seen by ortho spine. She has mild degenerative changes in the lumbar spine and it was recommended to keep her appointment with neurology on 10/11/24. Patient states she did not make the appointment due to her mobility issues. She states she can lose her balance.  She feels as if her legs shake when she walks.  She can have numbness.  She has seen ortho for left achilles tendonitis, bilateral foot pain, ankle pain.  Calf stretches and compound cream was recommended. Upon chart review, she has had visits for balance difficulties and falls since Jan 2024.      PMH  DM2  HTN  Chronic gait disorder      Past Medical History     Past Medical History:   Diagnosis Date    Essential hypertension, benign     Tendinitis         Past Surgical History     Past Surgical History:   Procedure Laterality Date    APPENDECTOMY      BREAST LUMPECTOMY Right         Medications     Prior to Admission medications    Medication Sig Start Date End Date Taking? Authorizing Provider   diclofenac (VOLTAREN) 75 MG EC tablet Take 1 tablet by mouth 2 times daily 9/26/24   Camila Chaudhry, APRN - NP   vitamin D (ERGOCALCIFEROL) 1.25 MG (53850 UT) CAPS capsule Take 1 capsule by mouth once a week 8/22/24   Praveena Cueva APRN - NP   losartan (COZAAR) 100 MG tablet Take 1 tablet by mouth daily 8/22/24   Praveena Cueva APRN - NP   acetaminophen (TYLENOL) 500 MG tablet Take 1 tablet by  movements symmetric Hearing intact to voice Speech clear Tongue midline  Motor: Limited effort on exam, 4/5 in bilateral upper ext, minimal movement in lower ext  Absent reflexes  Decreased distal temp/vib sensation in lower ext    Labs    CBC:  Recent Labs     12/12/24  1555   WBC 11.7   RBC 6.02*   HGB 15.5   HCT 49.9*   MCV 82.9   RDW 13.9        CHEMISTRIES:  Recent Labs     12/12/24  1555      K 3.7      CO2 28   BUN 28*   CREATININE 0.70   GLUCOSE 107*     PT/INR:No results for input(s): \"PROTIME\", \"INR\" in the last 72 hours.  APTT:No results for input(s): \"APTT\" in the last 72 hours.  LIVER PROFILE:  Recent Labs     12/12/24  1555   AST 16   ALT 18   BILITOT 1.0   ALKPHOS 86       Imaging/Diagnostics   CT ABDOMEN PELVIS WO CONTRAST Additional Contrast? None    Result Date: 12/13/2024  Gallbladder is moderately enlarged and filled with hyperdense bile and several relatively lucent gallstones. No significant inflammatory changes are seen and biliary ducts are not dilated. Probable myomatous uterus without significant adnexal abnormalities. The remainder the study is unremarkable. =================== Note: [facility] maintains that all CT scans at their facilities are performed by using dose optimization technique as appropriate to a performed examination, to include automated exposure control, adjustment of the mAs and/or kVp according to patient size (including appropriate matching for site specific examinations) or use of iterative reconstruction technique. Electronically signed by Shabbir Dimas    CT HEAD WO CONTRAST    Result Date: 12/13/2024  No acute ischemic change or intracranial hemorrhage. Possible old small cortical infarct in the posterolateral opercular left frontal lobe, stable. Very subtle periventricular lucencies may reflect some chronic microvascular ischemic change. The brain is otherwise unremarkable for age. There is a small hyperdense focus abutting the

## 2024-12-13 NOTE — ED NOTES
Patient has been resting in a position of comfort, vss and NAD. Respirations appear even and unlabored.

## 2024-12-13 NOTE — ED PROVIDER NOTES
Continue Effexor  Trazodone on hold  Supportive care    EMERGENCY DEPARTMENT HISTORY AND PHYSICAL EXAM      Patient Name: Justine Velasco  MRN: [id number]  YOB: 1955  Provider: Sissy Cain MD  PCP: Praveena Cueva APRN - NP   Time/Date of evaluation: 3:27 PM EST on 12/13/24    History of Presenting Illness     Chief Complaint   Patient presents with   • Leg Pain       History Provided By: Patient and Patient's Brother     History (Narative):   Justine Velasco is a 68 y.o. female with a PMHX of HTN  who presents to the emergency department  by EMS C/O bilateral LE weakness and multiple falls.  The patient is also c/o LLE pain.  She states that her left leg is weaker than her right.    Her brother states that the patient was in her usual state of health approximately 3 months ago where she was able to drive and function independently.  Over the past 3 months, the patient has has increased difficulty walking, decreasing LE strength, and has lost the ability to live and function independently.  The patient lives alone in an apartment that requires 10 steps to access.  She unable to live there and care for herself anymore.      She had one appointment with Ortho spine and was scheduled to have an outpatient appointment with Neurology but the patient was unable to get to her appointment due to her current state of immobility.      The patient's brother also stated that she has not been eating or drinking much because her weakness has gotten to the point where she is unable to get back and forth to the bathroom.  She has lost a significant amount of weight since her sx began according to her brother.            Past History     Past Medical History:  Past Medical History:   Diagnosis Date   • Essential hypertension, benign    • Tendinitis        Past Surgical History:  Past Surgical History:   Procedure Laterality Date   • APPENDECTOMY     • BREAST LUMPECTOMY Right        Family History:  Family History   Problem Relation Age of Onset   • No Known Problems Mother    •  OT per case management recommendation. [CS]   Fri Dec 13, 2024   0300 Difficulty ambulating, frequent falls, no one to help her at home, seen by Case Mgmt, working on finding a facility, UA pending [JM]   2149 Comprehensive Metabolic Panel(!):    Sodium 143   Potassium 3.7   Chloride 107   CARBON DIOXIDE 28   Anion Gap 8   Glucose 107(!)   BUN,BUNPL 28(!)   Creatinine 0.70   Bun/Cre 40(!)   Est, Glom Filt Rate >90   Calcium 10.5(!)   Total Bilirubin 1.0   ALT 18   AST 16   Alkaline Phosphatase 86   Total Protein 8.6(!)   Albumin 3.6   Globulin 5.0(!)   Albumin/Globulin Ratio 0.7(!) [EB]   2149 CBC with Auto Differential(!):    WBC 11.7   RBC 6.02(!)   Hemoglobin Quant 15.5   Hematocrit 49.9(!)   MCV 82.9   MCH 25.7   MCHC 31.1   RDW 13.9   Platelet Count 314   MPV 12.0(!)   Nucleated Red Blood Cells 0.0   Nucleated Red Blood Cells 0.00   Neutrophils % 83(!)   Lymphocyte % 13(!)   Monocytes % 4   Eosinophils % 0   Basophils % 0   Immature Granulocytes % 0   Neutrophils Absolute 9.6(!)   Lymphocytes Absolute 1.5   Monocytes Absolute 0.5   Eosinophils Absolute 0.0   Basophils Absolute 0.0   Immature Granulocytes Absolute 0.1(!)   Differential Type AUTOMATED [EB]      ED Course User Index  [CS] Codie Elizalde PA  [EB] Sissy Cain MD  [JM] Ace Singh, DO       Medical Decision Making     CC/HPI Summary, DDx, ED Course, and Reassessment: The patient is a 69 y/o woman with pmh sig for HTN was was in her usual state of health until approx 3 months ago after which she has had worsening bilat LE weakness, difficulty ambulating, and multiple falls.      My DDX is CVA vs spinal cord compression vs radiculopathy vs degenerative neurological disease (upper/lower/ or mixed) vs hip fx vs pelvic fx vs severe DJD.    CT head shows an age indeterminate infarct.  CT abdomen and pelvis does not show any significant bony abnormalities.    I consulted Neuro who recs admission for full neuro eval and testing.  I then consulted

## 2024-12-13 NOTE — ED NOTES
Report performed with jennifer walker     Pt on continuous VS monitoring. Side rails up x 2, HOB elevated per pt comfort request, call bell/side table within reach, blankets provided. NAD voiced or noted. Pt denies further needs at this time.

## 2024-12-13 NOTE — ED NOTES
TRANSFER - OUT REPORT:    Verbal report given to JUSTIN Mo on Justine Velasco  being transferred to [facility] for routine progression of patient care       Report consisted of patient's Situation, Background, Assessment and   Recommendations(SBAR).     Information from the following report(s) Nurse Handoff Report, ED Encounter Summary, ED SBAR, Intake/Output, MAR, Quality Measures, Neuro Assessment, and Event Log was reviewed with the receiving nurse.    [place] Fall Assessment:    Presents to emergency department  because of falls (Syncope, seizure, or loss of consciousness): Yes  Age > 70: No  Altered Mental Status, Intoxication with alcohol or substance confusion (Disorientation, impaired judgment, poor safety awaremess, or inability to follow instructions): No  Impaired Mobility: Ambulates or transfers with assistive devices or assistance; Unable to ambulate or transer.: Yes             Lines:   Peripheral IV 12/12/24 Left Antecubital (Active)   Site Assessment Clean, dry & intact 12/12/24 1601   Line Status Blood return noted 12/12/24 1601   Line Care Cap changed 12/12/24 1601   Phlebitis Assessment No symptoms 12/12/24 1601   Infiltration Assessment 0 12/12/24 1601   Alcohol Cap Used No 12/12/24 1601   Dressing Status New dressing applied 12/12/24 1601   Dressing Type Transparent 12/12/24 1601   Dressing Intervention New 12/12/24 1601        Opportunity for questions and clarification was provided.

## 2024-12-14 ENCOUNTER — APPOINTMENT (OUTPATIENT)
Facility: HOSPITAL | Age: 69
DRG: 948 | End: 2024-12-14
Payer: MEDICARE

## 2024-12-14 ENCOUNTER — APPOINTMENT (OUTPATIENT)
Facility: HOSPITAL | Age: 69
DRG: 948 | End: 2024-12-14
Attending: STUDENT IN AN ORGANIZED HEALTH CARE EDUCATION/TRAINING PROGRAM
Payer: MEDICARE

## 2024-12-14 PROBLEM — R26.89 MULTIFACTORIAL GAIT DISORDER: Status: ACTIVE | Noted: 2024-12-13

## 2024-12-14 LAB
25(OH)D3 SERPL-MCNC: 44 NG/ML (ref 30–100)
AMORPH CRY URNS QL MICRO: ABNORMAL
ANION GAP SERPL CALC-SCNC: 6 MMOL/L (ref 3–18)
APPEARANCE UR: ABNORMAL
BACTERIA URNS QL MICRO: ABNORMAL /HPF
BILIRUB UR QL: ABNORMAL
BUN SERPL-MCNC: 23 MG/DL (ref 7–18)
BUN/CREAT SERPL: 31 (ref 12–20)
CALCIUM SERPL-MCNC: 10.2 MG/DL (ref 8.5–10.1)
CALCIUM SERPL-MCNC: 10.3 MG/DL (ref 8.5–10.1)
CHLORIDE SERPL-SCNC: 109 MMOL/L (ref 100–111)
CHOLEST SERPL-MCNC: 174 MG/DL
CO2 SERPL-SCNC: 32 MMOL/L (ref 21–32)
COLOR UR: ABNORMAL
CREAT SERPL-MCNC: 0.74 MG/DL (ref 0.6–1.3)
EPITH CASTS URNS QL MICRO: ABNORMAL /LPF (ref 0–5)
ERYTHROCYTE [DISTWIDTH] IN BLOOD BY AUTOMATED COUNT: 13.9 % (ref 11.6–14.5)
EST. AVERAGE GLUCOSE BLD GHB EST-MCNC: 126 MG/DL
FOLATE SERPL-MCNC: 5.6 NG/ML (ref 3.1–17.5)
GLUCOSE BLD STRIP.AUTO-MCNC: 100 MG/DL (ref 70–110)
GLUCOSE BLD STRIP.AUTO-MCNC: 106 MG/DL (ref 70–110)
GLUCOSE BLD STRIP.AUTO-MCNC: 109 MG/DL (ref 70–110)
GLUCOSE BLD STRIP.AUTO-MCNC: 120 MG/DL (ref 70–110)
GLUCOSE SERPL-MCNC: 93 MG/DL (ref 74–99)
GLUCOSE UR STRIP.AUTO-MCNC: NEGATIVE MG/DL
HBA1C MFR BLD: 6 % (ref 4.2–5.6)
HCT VFR BLD AUTO: 45.8 % (ref 35–45)
HDLC SERPL-MCNC: 46 MG/DL (ref 40–60)
HDLC SERPL: 3.8 (ref 0–5)
HGB BLD-MCNC: 14.4 G/DL (ref 12–16)
HGB UR QL STRIP: NEGATIVE
KETONES UR QL STRIP.AUTO: ABNORMAL MG/DL
LDLC SERPL CALC-MCNC: 109 MG/DL (ref 0–100)
LEUKOCYTE ESTERASE UR QL STRIP.AUTO: ABNORMAL
LIPID PANEL: ABNORMAL
MCH RBC QN AUTO: 26.5 PG (ref 24–34)
MCHC RBC AUTO-ENTMCNC: 31.4 G/DL (ref 31–37)
MCV RBC AUTO: 84.2 FL (ref 78–100)
NITRITE UR QL STRIP.AUTO: NEGATIVE
NRBC # BLD: 0 K/UL (ref 0–0.01)
NRBC BLD-RTO: 0 PER 100 WBC
PH UR STRIP: 5.5 (ref 5–8)
PHOSPHATE SERPL-MCNC: 2.5 MG/DL (ref 2.5–4.9)
PLATELET # BLD AUTO: 277 K/UL (ref 135–420)
PMV BLD AUTO: 11.6 FL (ref 9.2–11.8)
POTASSIUM SERPL-SCNC: 3.4 MMOL/L (ref 3.5–5.5)
PROT UR STRIP-MCNC: 30 MG/DL
PTH-INTACT SERPL-MCNC: 38.7 PG/ML (ref 18.4–88)
RBC # BLD AUTO: 5.44 M/UL (ref 4.2–5.3)
RBC #/AREA URNS HPF: ABNORMAL /HPF (ref 0–5)
SODIUM SERPL-SCNC: 147 MMOL/L (ref 136–145)
SP GR UR REFRACTOMETRY: 1.02 (ref 1–1.03)
TRIGL SERPL-MCNC: 95 MG/DL
TSH SERPL DL<=0.05 MIU/L-ACNC: 0.57 UIU/ML (ref 0.36–3.74)
UROBILINOGEN UR QL STRIP.AUTO: 1 EU/DL (ref 0.2–1)
VIT B12 SERPL-MCNC: 1142 PG/ML (ref 211–911)
VLDLC SERPL CALC-MCNC: 19 MG/DL
WBC # BLD AUTO: 11.2 K/UL (ref 4.6–13.2)
WBC URNS QL MICRO: ABNORMAL /HPF (ref 0–5)

## 2024-12-14 PROCEDURE — 82746 ASSAY OF FOLIC ACID SERUM: CPT

## 2024-12-14 PROCEDURE — 1100000003 HC PRIVATE W/ TELEMETRY

## 2024-12-14 PROCEDURE — 6370000000 HC RX 637 (ALT 250 FOR IP): Performed by: HOSPITALIST

## 2024-12-14 PROCEDURE — 83036 HEMOGLOBIN GLYCOSYLATED A1C: CPT

## 2024-12-14 PROCEDURE — 2580000003 HC RX 258: Performed by: STUDENT IN AN ORGANIZED HEALTH CARE EDUCATION/TRAINING PROGRAM

## 2024-12-14 PROCEDURE — 36415 COLL VENOUS BLD VENIPUNCTURE: CPT

## 2024-12-14 PROCEDURE — 72157 MRI CHEST SPINE W/O & W/DYE: CPT

## 2024-12-14 PROCEDURE — 85027 COMPLETE CBC AUTOMATED: CPT

## 2024-12-14 PROCEDURE — 80048 BASIC METABOLIC PNL TOTAL CA: CPT

## 2024-12-14 PROCEDURE — 82962 GLUCOSE BLOOD TEST: CPT

## 2024-12-14 PROCEDURE — 2580000003 HC RX 258: Performed by: HOSPITALIST

## 2024-12-14 PROCEDURE — 93306 TTE W/DOPPLER COMPLETE: CPT

## 2024-12-14 PROCEDURE — 84443 ASSAY THYROID STIM HORMONE: CPT

## 2024-12-14 PROCEDURE — 6370000000 HC RX 637 (ALT 250 FOR IP): Performed by: STUDENT IN AN ORGANIZED HEALTH CARE EDUCATION/TRAINING PROGRAM

## 2024-12-14 PROCEDURE — A9577 INJ MULTIHANCE: HCPCS | Performed by: STUDENT IN AN ORGANIZED HEALTH CARE EDUCATION/TRAINING PROGRAM

## 2024-12-14 PROCEDURE — 6360000002 HC RX W HCPCS: Performed by: STUDENT IN AN ORGANIZED HEALTH CARE EDUCATION/TRAINING PROGRAM

## 2024-12-14 PROCEDURE — 6360000004 HC RX CONTRAST MEDICATION: Performed by: STUDENT IN AN ORGANIZED HEALTH CARE EDUCATION/TRAINING PROGRAM

## 2024-12-14 PROCEDURE — 93880 EXTRACRANIAL BILAT STUDY: CPT

## 2024-12-14 PROCEDURE — 83970 ASSAY OF PARATHORMONE: CPT

## 2024-12-14 PROCEDURE — 87086 URINE CULTURE/COLONY COUNT: CPT

## 2024-12-14 PROCEDURE — 70551 MRI BRAIN STEM W/O DYE: CPT

## 2024-12-14 PROCEDURE — 82607 VITAMIN B-12: CPT

## 2024-12-14 PROCEDURE — 72158 MRI LUMBAR SPINE W/O & W/DYE: CPT

## 2024-12-14 PROCEDURE — 99232 SBSQ HOSP IP/OBS MODERATE 35: CPT | Performed by: PSYCHIATRY & NEUROLOGY

## 2024-12-14 PROCEDURE — 82306 VITAMIN D 25 HYDROXY: CPT

## 2024-12-14 PROCEDURE — 84100 ASSAY OF PHOSPHORUS: CPT

## 2024-12-14 PROCEDURE — 81001 URINALYSIS AUTO W/SCOPE: CPT

## 2024-12-14 PROCEDURE — 99232 SBSQ HOSP IP/OBS MODERATE 35: CPT | Performed by: HOSPITALIST

## 2024-12-14 PROCEDURE — 80061 LIPID PANEL: CPT

## 2024-12-14 PROCEDURE — 6370000000 HC RX 637 (ALT 250 FOR IP): Performed by: PHYSICIAN ASSISTANT

## 2024-12-14 RX ORDER — SODIUM CHLORIDE 450 MG/100ML
INJECTION, SOLUTION INTRAVENOUS CONTINUOUS
Status: DISCONTINUED | OUTPATIENT
Start: 2024-12-14 | End: 2024-12-18 | Stop reason: HOSPADM

## 2024-12-14 RX ORDER — FAMOTIDINE 20 MG/1
20 TABLET, FILM COATED ORAL 2 TIMES DAILY
Status: DISCONTINUED | OUTPATIENT
Start: 2024-12-14 | End: 2024-12-18 | Stop reason: HOSPADM

## 2024-12-14 RX ORDER — POTASSIUM CHLORIDE 1500 MG/1
40 TABLET, EXTENDED RELEASE ORAL ONCE
Status: COMPLETED | OUTPATIENT
Start: 2024-12-14 | End: 2024-12-14

## 2024-12-14 RX ADMIN — SODIUM CHLORIDE: 4.5 INJECTION, SOLUTION INTRAVENOUS at 14:40

## 2024-12-14 RX ADMIN — ASPIRIN 81 MG: 81 TABLET, COATED ORAL at 08:34

## 2024-12-14 RX ADMIN — FAMOTIDINE 20 MG: 20 TABLET ORAL at 20:46

## 2024-12-14 RX ADMIN — GADOBENATE DIMEGLUMINE 12 ML: 529 INJECTION, SOLUTION INTRAVENOUS at 18:30

## 2024-12-14 RX ADMIN — SODIUM CHLORIDE, PRESERVATIVE FREE 10 ML: 5 INJECTION INTRAVENOUS at 20:46

## 2024-12-14 RX ADMIN — LOSARTAN POTASSIUM 100 MG: 50 TABLET, FILM COATED ORAL at 08:34

## 2024-12-14 RX ADMIN — ENOXAPARIN SODIUM 40 MG: 100 INJECTION SUBCUTANEOUS at 08:35

## 2024-12-14 RX ADMIN — SODIUM CHLORIDE: 4.5 INJECTION, SOLUTION INTRAVENOUS at 19:56

## 2024-12-14 RX ADMIN — FAMOTIDINE 40 MG: 20 TABLET, FILM COATED ORAL at 08:34

## 2024-12-14 RX ADMIN — POTASSIUM CHLORIDE 40 MEQ: 1500 TABLET, EXTENDED RELEASE ORAL at 14:36

## 2024-12-14 ASSESSMENT — PAIN SCALES - GENERAL
PAINLEVEL_OUTOF10: 0
PAINLEVEL_OUTOF10: 0

## 2024-12-14 NOTE — PLAN OF CARE
Progressing  Note: Monitor pt's pain and treat according to score     Problem: Safety - Adult  Goal: Free from fall injury  12/14/2024 0228 by Willa Hart, RN  Outcome: Progressing  12/13/2024 1955 by Chely Jasso, RN  Outcome: Progressing  Note: Ensure safe ambulation of pt

## 2024-12-14 NOTE — PROGRESS NOTES
New OT orders received and chart reviewed. Patient was evaluated on 12/13/2024 and on OT caseload. New OT order will be acknowledged. Thank you for the referral.     Lico Fontenot MS, OTR/L

## 2024-12-14 NOTE — PROGRESS NOTES
[facility] Hospitalist Group  Progress Note    Patient: Justine Velasco Age: 68 y.o. : 1955 MR#: [id number] SSN: [id number]  Date/Time: 2024     Subjective: Patient lying in the bed, continues to feel weak in her legs, denies any back pain, no bowel or bladder incontinence.     Assessment/Plan:   Lower extremity weakness and ataxia with recurrent falls, multifactorial  Mild hypernatremia  Hypokalemia  Mild hypercalcemia  Diabetes mellitus type 2  Hypertension    Plan  Will start IV hydration with half-normal saline, monitor sodium, calcium levels  Hypercalcemia workup pending but suspect most likely dehydration related or immobilization related  Neurology input noted, MRI of spine and brain pending  Will replace potassium and monitor  Continue Lantus and SSI monitor blood sugars  PT/OT eval and treatment  Further plan based on hospital course    Discussed with patient at bedside explained about my above plan care.  Patient understood and agreed with my plan.  Offered to talk to family, patient states she will update her family.      Dispo plan: Home with home health care once medically stable, anticipated discharge date 12/15/2024    Addendum  Patient developed urinary retention  Ely catheter placed with 700 mL of urine output  Will send UA and urine culture  MRI still pending      Case discussed with:  [x]Patient  []Family  [x]Nursing  []Case Management  DVT Prophylaxis:  [x]Lovenox  []Hep SQ  []SCDs  []Coumadin   []Eliquis/Xarelto     Objective:   VS: BP (!) 164/102   Pulse 81   Temp 98.2 °F (36.8 °C) (Oral)   Resp 18   Ht 1.676 m (5' 6\")   Wt 61.2 kg (135 lb)   SpO2 99%   BMI 21.79 kg/m²    Tmax/24hrs: Temp (24hrs), Av.1 °F (36.7 °C), Min:97.7 °F (36.5 °C), Max:98.6 °F (37 °C)  IOBRIEF  Intake/Output Summary (Last 24 hours) at 2024 6806  Last data filed at 2024  Gross per 24 hour   Intake 480 ml   Output --   Net 480 ml       General:  Alert,  Value Ref Range    Phosphorus 2.5 2.5 - 4.9 MG/DL   Lipid Panel    Collection Time: 12/14/24  5:09 AM   Result Value Ref Range    LIPID PANEL        Cholesterol, Total 174 <200 MG/DL    Triglycerides 95 <150 MG/DL    HDL 46 40 - 60 MG/DL    LDL Cholesterol 109 (H) 0 - 100 MG/DL    VLDL Cholesterol Calculated 19 MG/DL    Chol/HDL Ratio 3.8 0 - 5.0     Vitamin B12 & Folate    Collection Time: 12/14/24  5:09 AM   Result Value Ref Range    Vitamin B-12 1142 (H) 211 - 911 pg/mL    Folate 5.6 3.10 - 17.50 ng/mL   Vitamin D 25 Hydroxy    Collection Time: 12/14/24  5:09 AM   Result Value Ref Range    Vit D, 25-Hydroxy 44.0 30 - 100 ng/mL   POCT Glucose    Collection Time: 12/14/24  7:04 AM   Result Value Ref Range    POC Glucose 106 70 - 110 mg/dL   Vascular duplex carotid bilateral    Collection Time: 12/14/24 10:24 AM   Result Value Ref Range    Body Surface Area 1.69 m2    Right ECA PSV 65.1 cm/s    Right ECA EDV 6.39 cm/s    Right vertebral PSV 46.8 cm/s    Right vertebral EDV 14.22 cm/s    Right cca dist PSV 67.7 cm/s    Right CCA dist EDV 12.9 cm/s    Right CCA mid PSV 82.04 cm/s    Right CCA mid EDV 16.83 cm/s    Right CCA prox PSV 78.1 cm/s    Right CCA prox EDV 7.7 cm/s    Right bulb PSV 70.3 cm/s    Right bulb EDV 15.5 cm/s    Right ICA dist PSV 69.2 cm/s    Right ICA dist EDV 22.0 cm/s    Right ICA mid PSV 48.5 cm/s    Right ICA mid EDV 13.6 cm/s    Right ICA prox PSV 45.0 cm/s    Right ICA prox EDV 9.8 cm/s    Right subclavian prox PSV 62.5 cm/s    Right subclavian prox EDV 0.0 cm/s    Right ICA/CCA PSV 1.0 no units    Left ECA PSV 54.8 cm/s    Left ECA EDV 0.00 cm/s    Left vertebral PSV 38.3 cm/s    Left vertebral EDV 11.25 cm/s    Left CCA dist PSV 75.8 cm/s    Left CCA dist EDV 13.7 cm/s    Left CCA mid PSV 87.48 cm/s    Left CCA mid EDV 16.31 cm/s    Left CCA prox PSV 91.4 cm/s    Left CCA prox EDV 11.1 cm/s    Left bulb PSV 33.1 cm/s    Left bulb EDV 8.6 cm/s    Left ICA dist PSV 89.8 cm/s    Left ICA dist

## 2024-12-14 NOTE — CARE COORDINATION
12/14/24 1220   Service Assessment   Patient Orientation Alert and Oriented   Cognition Alert   History Provided By Patient   Primary Caregiver Self   Accompanied By/Relationship Spouse and son   Support Systems Spouse/Significant Other;Children   Patient's Healthcare Decision Maker is: Legal Next of Kin   PCP Verified by CM Yes   Last Visit to PCP Within last 3 months   Prior Functional Level Assistance with the following:;Cooking;Housework;Shopping   Current Functional Level Assistance with the following:;Cooking;Housework;Shopping;Bathing   Can patient return to prior living arrangement Unknown at present   Ability to make needs known: Good   Family able to assist with home care needs: Yes   Would you like for me to discuss the discharge plan with any other family members/significant others, and if so, who? Yes  (Spouse)   Financial Resources Medicare   Community Resources None   CM/SW Referral Other (see comment)   Social/Functional History   Lives With Spouse   Type of Home Apartment   Home Layout Multi-level   Home Access Stairs to enter with rails   Entrance Stairs - Number of Steps 12   Entrance Stairs - Rails Both   Bathroom Shower/Tub Tub/Shower unit   Bathroom Toilet Standard   Bathroom Equipment None   Bathroom Accessibility Accessible   Home Equipment Cane;Walker - Rolling   Prior Level of Assist for ADLs Needs assistance   Toileting Needs assistance   Prior Level of Assist for Homemaking Needs assistance   Homemaking Responsibilities No   Ambulation Assistance Needs assistance   Prior Level of Assist for Transfers Independent   Active  No   Patient's  Info Depends on other for transportation   Occupation Retired   Discharge Planning   Type of Residence Skilled Nursing Facility   Living Arrangements Spouse/Significant Other   Current Services Prior To Admission None   Potential Assistance Needed N/A   DME Ordered? No   Potential Assistance Purchasing Medications No   Type of Home Care

## 2024-12-14 NOTE — PROGRESS NOTES
4 Eyes Skin Assessment     NAME:  Justine Velasco  YOB: 1955  MEDICAL RECORD NUMBER:  [id number]    The patient is being assessed for  Admission    I agree that at least one RN has performed a thorough Head to Toe Skin Assessment on the patient. ALL assessment sites listed below have been assessed.      Areas assessed by both nurses:    Head, Face, Ears, Shoulders, Back, Chest, Arms, Elbows, Hands, Sacrum. Buttock, Coccyx, Ischium, and Legs. Feet and Heels        Does the Patient have a Wound? No noted wound(s)       Haider Prevention initiated by RN: No  Wound Care Orders initiated by RN: No    Pressure Injury (Stage 3,4, Unstageable, DTI, NWPT, and Complex wounds) if present, place Wound referral order by RN under : No    New Ostomies, if present place, Ostomy referral order under : No     Nurse 1 eSignature: Electronically signed by Chely Jasso RN on 12/13/24 at 8:23 PM EST    **SHARE this note so that the co-signing nurse can place an eSignature**    Nurse 2 eSignature: Electronically signed by ROCHELLE ALEMAN RN on 12/15/24 at 1:50 AM EST

## 2024-12-14 NOTE — PROGRESS NOTES
Progress Note  Date:2024       Room:[facility]  Patient Name:Justine Velasco     YOB: 1955     Age:68 y.o.        Subjective    Subjective patient feels legs are still weak  Review of Systems  Objective         Vitals Last 24 Hours:  TEMPERATURE:  Temp  Av.1 °F (36.7 °C)  Min: 97.7 °F (36.5 °C)  Max: 98.6 °F (37 °C)  RESPIRATIONS RANGE: Resp  Av  Min: 16  Max: 18  PULSE OXIMETRY RANGE: SpO2  Av.8 %  Min: 96 %  Max: 100 %  PULSE RANGE: Pulse  Av.3  Min: 65  Max: 89  BLOOD PRESSURE RANGE: Systolic (24hrs), Av , Min:135 , Max:165   ; Diastolic (24hrs), Av, Min:85, Max:102    I/O (24Hr):    Intake/Output Summary (Last 24 hours) at 2024 1627  Last data filed at 2024  Gross per 24 hour   Intake 480 ml   Output --   Net 480 ml     Objective    Exam  General: Awake, alert nad  Neuro: MS Aox3 language fluent, no neglect  CN: EOM intact Facial movements symmetric Hearing intact to voice Speech clear Tongue midline  Motor: Lifts both arms against gravity  Minimal voluntary movement in legs  Labs/Imaging/Diagnostics    Labs:  CBC:  Recent Labs     24  1555 24  0509   WBC 11.7 11.2   RBC 6.02* 5.44*   HGB 15.5 14.4   HCT 49.9* 45.8*   MCV 82.9 84.2   RDW 13.9 13.9    277     CHEMISTRIES:  Recent Labs     24  1555 24  0509    147*   K 3.7 3.4*    109   CO2 28 32   BUN 28* 23*   CREATININE 0.70 0.74   GLUCOSE 107* 93   PHOS  --  2.5     PT/INR:No results for input(s): \"PROTIME\", \"INR\" in the last 72 hours.  APTT:No results for input(s): \"APTT\" in the last 72 hours.  LIVER PROFILE:  Recent Labs     24  1555   AST 16   ALT 18   BILITOT 1.0   ALKPHOS 86       Imaging Last 24 Hours:  CT ABDOMEN PELVIS WO CONTRAST Additional Contrast? None    Result Date: 2024  EXAM: CT ABDOMEN/PELVIS  CPT code: 67410, 22540 CLINICAL INDICATION/HISTORY: Multiple falls; bilateral lower extremity weakness. COMPARISON: None. TECHNIQUE:  adjustment of the mAs and/or kVp according to patient size (including appropriate matching for site specific examinations) or use of iterative reconstruction technique. Electronically signed by Shabbir Dimas    CT HEAD WO CONTRAST    Result Date: 12/13/2024  EXAM: CT BRAIN  CPT code: 73601  CLINICAL INDICATION/HISTORY: Multiple falls with bilateral lower extremity weakness. Concern for CVA. COMPARISON: 24 June 2024 and 18 January 2024. TECHNIQUE: Contiguous noncontrast axial images of the brain are obtained from the foramen magnum to the vertex and reviewed in brain and bone windows. FINDINGS: There is a small focus of cortical encephalomalacia in the posterolateral opercular left frontal lobe, unchanged. No other focal abnormalities are seen. There may be some very subtle decreased attenuation in the periventricular deep cerebral white matter. There is a small focus of increased density abutting the calvarial surface at the superolateral mid left temporal lobe at the upper margin of the middle cranial fossa. This has dimensions of 10 mm AP, 8 mm transverse and 10 mm SI. This is unchanged from the prior studies.  The cerebrospinal fluid spaces are normal for age.  No other significant extra-axial abnormalities are seen.  The pituitary fossa is unremarkable.  The mastoids and visualized paranasal sinuses are clear.  The visualized orbits look grossly normal.  No significant bony abnormalities are seen.     No acute ischemic change or intracranial hemorrhage. Possible old small cortical infarct in the posterolateral opercular left frontal lobe, stable. Very subtle periventricular lucencies may reflect some chronic microvascular ischemic change. The brain is otherwise unremarkable for age. There is a small hyperdense focus abutting the calvarium at the upper lateral mid left temporal lobe. This is not clearly extra-axial but is unchanged over about a year and is unlikely to be hemorrhage; possibly a small meningioma. MRI

## 2024-12-14 NOTE — PROGRESS NOTES
Unable to remove earring in left ear. Patient states she would rather wait to do exam tomorrow after her  visits and cuts post of earring for her. Will attempt exam again when metal free.

## 2024-12-14 NOTE — PLAN OF CARE
Problem: Safety - Adult  Goal: Free from fall injury  Outcome: Progressing  Note: Ensure safe ambulation of pt     Problem: Pain  Goal: Verbalizes/displays adequate comfort level or baseline comfort level  Outcome: Progressing  Note: Monitor pt's pain and treat according to score     Problem: Discharge Planning  Goal: Discharge to home or other facility with appropriate resources  Outcome: Progressing  Flowsheets (Taken 12/13/2024 1955)  Discharge to home or other facility with appropriate resources:   Identify barriers to discharge with patient and caregiver   Arrange for needed discharge resources and transportation as appropriate   Identify discharge learning needs (meds, wound care, etc)     Problem: Chronic Conditions and Co-morbidities  Goal: Patient's chronic conditions and co-morbidity symptoms are monitored and maintained or improved  Outcome: Progressing  Flowsheets (Taken 12/13/2024 1955)  Care Plan - Patient's Chronic Conditions and Co-Morbidity Symptoms are Monitored and Maintained or Improved:   Monitor and assess patient's chronic conditions and comorbid symptoms for stability, deterioration, or improvement   Collaborate with multidisciplinary team to address chronic and comorbid conditions and prevent exacerbation or deterioration   Update acute care plan with appropriate goals if chronic or comorbid symptoms are exacerbated and prevent overall improvement and discharge

## 2024-12-15 LAB
ANION GAP SERPL CALC-SCNC: 7 MMOL/L (ref 3–18)
BASOPHILS # BLD: 0 K/UL (ref 0–0.1)
BASOPHILS NFR BLD: 0 % (ref 0–2)
BUN SERPL-MCNC: 22 MG/DL (ref 7–18)
BUN/CREAT SERPL: 35 (ref 12–20)
CA-I SERPL-SCNC: 1.29 MMOL/L (ref 1.15–1.33)
CALCIUM SERPL-MCNC: 9.6 MG/DL (ref 8.5–10.1)
CHLORIDE SERPL-SCNC: 109 MMOL/L (ref 100–111)
CO2 SERPL-SCNC: 27 MMOL/L (ref 21–32)
CREAT SERPL-MCNC: 0.62 MG/DL (ref 0.6–1.3)
DIFFERENTIAL METHOD BLD: ABNORMAL
ECHO AO ROOT DIAM: 2.4 CM
ECHO AO ROOT INDEX: 1.42 CM/M2
ECHO AV AREA PEAK VELOCITY: 1.8 CM2
ECHO AV AREA VTI: 1.9 CM2
ECHO AV AREA/BSA PEAK VELOCITY: 1.1 CM2/M2
ECHO AV AREA/BSA VTI: 1.1 CM2/M2
ECHO AV MEAN GRADIENT: 3 MMHG
ECHO AV MEAN VELOCITY: 0.8 M/S
ECHO AV PEAK GRADIENT: 7 MMHG
ECHO AV PEAK VELOCITY: 1.3 M/S
ECHO AV VELOCITY RATIO: 0.69
ECHO AV VTI: 21.6 CM
ECHO BSA: 1.69 M2
ECHO LA DIAMETER INDEX: 1.66 CM/M2
ECHO LA DIAMETER: 2.8 CM
ECHO LA TO AORTIC ROOT RATIO: 1.17
ECHO LA VOL A-L A4C: 21 ML (ref 22–52)
ECHO LA VOL MOD A4C: 20 ML (ref 22–52)
ECHO LA VOLUME INDEX A-L A4C: 12 ML/M2 (ref 16–34)
ECHO LA VOLUME INDEX MOD A4C: 12 ML/M2 (ref 16–34)
ECHO LV E' LATERAL VELOCITY: 9.74 CM/S
ECHO LV E' SEPTAL VELOCITY: 6.77 CM/S
ECHO LV EF PHYSICIAN: 60 %
ECHO LV FRACTIONAL SHORTENING: 31 % (ref 28–44)
ECHO LV INTERNAL DIMENSION DIASTOLE INDEX: 2.31 CM/M2
ECHO LV INTERNAL DIMENSION DIASTOLIC: 3.9 CM (ref 3.9–5.3)
ECHO LV INTERNAL DIMENSION SYSTOLIC INDEX: 1.6 CM/M2
ECHO LV INTERNAL DIMENSION SYSTOLIC: 2.7 CM
ECHO LV IVSD: 1 CM (ref 0.6–0.9)
ECHO LV MASS 2D: 122.1 G (ref 67–162)
ECHO LV MASS INDEX 2D: 72.3 G/M2 (ref 43–95)
ECHO LV POSTERIOR WALL DIASTOLIC: 1 CM (ref 0.6–0.9)
ECHO LV RELATIVE WALL THICKNESS RATIO: 0.51
ECHO LVOT AREA: 2.5 CM2
ECHO LVOT AV VTI INDEX: 0.78
ECHO LVOT DIAM: 1.8 CM
ECHO LVOT MEAN GRADIENT: 1 MMHG
ECHO LVOT PEAK GRADIENT: 3 MMHG
ECHO LVOT PEAK VELOCITY: 0.9 M/S
ECHO LVOT STROKE VOLUME INDEX: 25.3 ML/M2
ECHO LVOT SV: 42.7 ML
ECHO LVOT VTI: 16.8 CM
ECHO MV A VELOCITY: 0.84 M/S
ECHO MV E DECELERATION TIME (DT): 205.1 MS
ECHO MV E VELOCITY: 0.47 M/S
ECHO MV E/A RATIO: 0.56
ECHO MV E/E' LATERAL: 4.83
ECHO MV E/E' RATIO (AVERAGED): 5.88
ECHO MV E/E' SEPTAL: 6.94
ECHO MV REGURGITANT PEAK GRADIENT: 41 MMHG
ECHO MV REGURGITANT PEAK VELOCITY: 3.2 M/S
ECHO MV REGURGITANT VTIA: 88.4 CM
ECHO PV AREA CONTINUITY EQ VELOCITY: 2.7 CM2
ECHO PV AREA VTI: 3 CM2
ECHO PV MAX VELOCITY: 0.8 M/S
ECHO PV MEAN GRADIENT: 1 MMHG
ECHO PV MEAN VELOCITY: 0.5 M/S
ECHO PV PEAK GRADIENT: 2 MMHG
ECHO PV VTI: 14.3 CM
ECHO QP:QS RATIO: 0.99
ECHO RV FREE WALL PEAK S': 15.7 CM/S
ECHO RV TAPSE: 1.7 CM (ref 1.7–?)
ECHO RVOT AREA: 3.8 CM2
ECHO RVOT DIAMETER: 2.2 CM
ECHO RVOT MEAN GRADIENT: 1 MMHG
ECHO RVOT PEAK GRADIENT: 1 MMHG
ECHO RVOT PEAK VELOCITY: 0.6 M/S
ECHO RVOT STROKE VOLUME: 42.2 ML
ECHO RVOT VTI: 11.1 CM
ECHO TV REGURGITANT MAX VELOCITY: 2.16 M/S
ECHO TV REGURGITANT PEAK GRADIENT: 19 MMHG
EOSINOPHIL # BLD: 0 K/UL (ref 0–0.4)
EOSINOPHIL NFR BLD: 0 % (ref 0–5)
ERYTHROCYTE [DISTWIDTH] IN BLOOD BY AUTOMATED COUNT: 13.7 % (ref 11.6–14.5)
GLUCOSE BLD STRIP.AUTO-MCNC: 109 MG/DL (ref 70–110)
GLUCOSE BLD STRIP.AUTO-MCNC: 110 MG/DL (ref 70–110)
GLUCOSE BLD STRIP.AUTO-MCNC: 85 MG/DL (ref 70–110)
GLUCOSE BLD STRIP.AUTO-MCNC: 91 MG/DL (ref 70–110)
GLUCOSE SERPL-MCNC: 79 MG/DL (ref 74–99)
HCT VFR BLD AUTO: 42.4 % (ref 35–45)
HGB BLD-MCNC: 13.3 G/DL (ref 12–16)
IMM GRANULOCYTES # BLD AUTO: 0 K/UL (ref 0–0.04)
IMM GRANULOCYTES NFR BLD AUTO: 0 % (ref 0–0.5)
LYMPHOCYTES # BLD: 2.1 K/UL (ref 0.9–3.6)
LYMPHOCYTES NFR BLD: 23 % (ref 21–52)
MAGNESIUM SERPL-MCNC: 2 MG/DL (ref 1.6–2.6)
MCH RBC QN AUTO: 26.2 PG (ref 24–34)
MCHC RBC AUTO-ENTMCNC: 31.4 G/DL (ref 31–37)
MCV RBC AUTO: 83.5 FL (ref 78–100)
MONOCYTES # BLD: 0.5 K/UL (ref 0.05–1.2)
MONOCYTES NFR BLD: 5 % (ref 3–10)
NEUTS SEG # BLD: 6.6 K/UL (ref 1.8–8)
NEUTS SEG NFR BLD: 71 % (ref 40–73)
NRBC # BLD: 0 K/UL (ref 0–0.01)
NRBC BLD-RTO: 0 PER 100 WBC
PLATELET # BLD AUTO: 265 K/UL (ref 135–420)
PMV BLD AUTO: 12.1 FL (ref 9.2–11.8)
POTASSIUM SERPL-SCNC: 3.5 MMOL/L (ref 3.5–5.5)
RBC # BLD AUTO: 5.08 M/UL (ref 4.2–5.3)
SODIUM SERPL-SCNC: 143 MMOL/L (ref 136–145)
WBC # BLD AUTO: 9.2 K/UL (ref 4.6–13.2)

## 2024-12-15 PROCEDURE — 99232 SBSQ HOSP IP/OBS MODERATE 35: CPT | Performed by: FAMILY MEDICINE

## 2024-12-15 PROCEDURE — 82962 GLUCOSE BLOOD TEST: CPT

## 2024-12-15 PROCEDURE — 6360000002 HC RX W HCPCS: Performed by: STUDENT IN AN ORGANIZED HEALTH CARE EDUCATION/TRAINING PROGRAM

## 2024-12-15 PROCEDURE — 2580000003 HC RX 258: Performed by: HOSPITALIST

## 2024-12-15 PROCEDURE — 85025 COMPLETE CBC W/AUTO DIFF WBC: CPT

## 2024-12-15 PROCEDURE — 6370000000 HC RX 637 (ALT 250 FOR IP): Performed by: STUDENT IN AN ORGANIZED HEALTH CARE EDUCATION/TRAINING PROGRAM

## 2024-12-15 PROCEDURE — 93306 TTE W/DOPPLER COMPLETE: CPT | Performed by: INTERNAL MEDICINE

## 2024-12-15 PROCEDURE — 2580000003 HC RX 258: Performed by: STUDENT IN AN ORGANIZED HEALTH CARE EDUCATION/TRAINING PROGRAM

## 2024-12-15 PROCEDURE — 82330 ASSAY OF CALCIUM: CPT

## 2024-12-15 PROCEDURE — 36415 COLL VENOUS BLD VENIPUNCTURE: CPT

## 2024-12-15 PROCEDURE — 99232 SBSQ HOSP IP/OBS MODERATE 35: CPT | Performed by: PSYCHIATRY & NEUROLOGY

## 2024-12-15 PROCEDURE — 6370000000 HC RX 637 (ALT 250 FOR IP): Performed by: PHYSICIAN ASSISTANT

## 2024-12-15 PROCEDURE — 1100000003 HC PRIVATE W/ TELEMETRY

## 2024-12-15 PROCEDURE — 80048 BASIC METABOLIC PNL TOTAL CA: CPT

## 2024-12-15 PROCEDURE — 83735 ASSAY OF MAGNESIUM: CPT

## 2024-12-15 RX ADMIN — INSULIN GLARGINE 10 UNITS: 100 INJECTION, SOLUTION SUBCUTANEOUS at 21:32

## 2024-12-15 RX ADMIN — SODIUM CHLORIDE: 4.5 INJECTION, SOLUTION INTRAVENOUS at 21:32

## 2024-12-15 RX ADMIN — ASPIRIN 81 MG: 81 TABLET, COATED ORAL at 10:34

## 2024-12-15 RX ADMIN — ENOXAPARIN SODIUM 40 MG: 100 INJECTION SUBCUTANEOUS at 10:34

## 2024-12-15 RX ADMIN — FAMOTIDINE 20 MG: 20 TABLET ORAL at 21:05

## 2024-12-15 RX ADMIN — LOSARTAN POTASSIUM 100 MG: 50 TABLET, FILM COATED ORAL at 10:34

## 2024-12-15 RX ADMIN — FAMOTIDINE 20 MG: 20 TABLET ORAL at 10:34

## 2024-12-15 RX ADMIN — SODIUM CHLORIDE, PRESERVATIVE FREE 10 ML: 5 INJECTION INTRAVENOUS at 21:33

## 2024-12-15 ASSESSMENT — PAIN SCALES - GENERAL
PAINLEVEL_OUTOF10: 0

## 2024-12-15 NOTE — PROGRESS NOTES
Right ICA prox PSV 45.0 cm/s    Right ICA prox EDV 9.8 cm/s    Right subclavian prox PSV 62.5 cm/s    Right subclavian prox EDV 0.0 cm/s    Right ICA/CCA PSV 1.0 no units    Left ECA PSV 54.8 cm/s    Left ECA EDV 0.00 cm/s    Left vertebral PSV 38.3 cm/s    Left vertebral EDV 11.25 cm/s    Left CCA dist PSV 75.8 cm/s    Left CCA dist EDV 13.7 cm/s    Left CCA mid PSV 87.48 cm/s    Left CCA mid EDV 16.31 cm/s    Left CCA prox PSV 91.4 cm/s    Left CCA prox EDV 11.1 cm/s    Left bulb PSV 33.1 cm/s    Left bulb EDV 8.6 cm/s    Left ICA dist PSV 89.8 cm/s    Left ICA dist EDV 30.1 cm/s    Left ICA mid PSV 69.4 cm/s    Left ICA mid EDV 19.5 cm/s    Left ICA prox PSV 62.9 cm/s    Left ICA prox EDV 18.4 cm/s    Left subclavian prox PSV 65.9 cm/s    Left subclavian prox EDV 0.0 cm/s    Left ICA/CCA PSV 1.18 no units   POCT Glucose    Collection Time: 12/14/24 11:55 AM   Result Value Ref Range    POC Glucose 100 70 - 110 mg/dL   Urinalysis    Collection Time: 12/14/24  4:21 PM   Result Value Ref Range    Color, UA DARK YELLOW      Appearance TURBID      Specific Gravity, UA 1.021 1.005 - 1.030      pH, Urine 5.5 5.0 - 8.0      Protein, UA 30 (A) NEG mg/dL    Glucose, Ur Negative NEG mg/dL    Ketones, Urine TRACE (A) NEG mg/dL    Bilirubin, Urine SMALL (A) NEG      Blood, Urine Negative NEG      Urobilinogen, Urine 1.0 0.2 - 1.0 EU/dL    Nitrite, Urine Negative NEG      Leukocyte Esterase, Urine MODERATE (A) NEG     Urinalysis, Micro    Collection Time: 12/14/24  4:21 PM   Result Value Ref Range    WBC, UA 4-10 0 - 5 /hpf    RBC, UA NONE 0 - 5 /hpf    Epithelial Cells, UA FEW 0 - 5 /lpf    BACTERIA, URINE FEW (A) NEG /hpf    Amorphous Crystal 2+ (A) NEG   POCT Glucose    Collection Time: 12/14/24  4:34 PM   Result Value Ref Range    POC Glucose 109 70 - 110 mg/dL   Echo (TTE) complete (PRN contrast/bubble/strain/3D)    Collection Time: 12/14/24  4:35 PM   Result Value Ref Range    IVSd 1.0 (A) 0.6 - 0.9 cm    LVIDd 3.9 3.9 -  5.3 cm    LVIDs 2.7 cm    LVOT Diameter 1.8 cm    LVPWd 1.0 (A) 0.6 - 0.9 cm    LVOT Peak Gradient 3 mmHg    LVOT Mean Gradient 1 mmHg    LVOT SV 42.7 ml    LVOT Peak Velocity 0.9 m/s    LVOT VTI 16.8 cm    RVOT Diameter 2.2 cm    RV Free Wall Peak S' 15.7 cm/s    RVOT Peak Gradient 1 mmHg    RVOT Mean Gradient 1 mmHg    RVOT Peak Velocity 0.6 m/s    RVOT VTI 11.1 cm    LA Diameter 2.8 cm    LA Volume A-L A4C 21 (A) 22 - 52 mL    LA Volume MOD A4C 20 (A) 22 - 52 mL    AV Area by Peak Velocity 1.8 cm2    AV Area by VTI 1.9 cm2    AV Peak Gradient 7 mmHg    AV Mean Gradient 3 mmHg    AV Peak Velocity 1.3 m/s    AV Mean Velocity 0.8 m/s    AV VTI 21.6 cm    MV A Velocity 0.84 m/s    MV E Wave Deceleration Time 205.1 ms    MV E Velocity 0.47 m/s    LV E' Lateral Velocity 9.74 cm/s    LV E' Septal Velocity 6.77 cm/s    MR Peak Gradient 41 mmHg    MR Peak Velocity 3.2 m/s    MR VTI 88.4 cm    PV Area by Continuity 2.7 cm2    PV Area by VTI 3.0 cm2    PV Peak Gradient 2 mmHg    PV Mean Gradient 1 mmHg    PV Max Velocity 0.8 m/s    PV Mean Velocity 0.5 m/s    PV VTI 14.3 cm    TAPSE 1.7 1.7 cm    TR Peak Gradient 19 mmHg    TR Max Velocity 2.16 m/s    Aortic Root 2.4 cm    Body Surface Area 1.69 m2    Fractional Shortening 2D 31 28 - 44 %    LVIDd Index 2.31 cm/m2    LVIDs Index 1.60 cm/m2    LV RWT Ratio 0.51     LV Mass 2D 122.1 67 - 162 g    LV Mass 2D Index 72.3 43 - 95 g/m2    MV E/A 0.56     E/E' Ratio (Averaged) 5.88     E/E' Lateral 4.83     E/E' Septal 6.94     LVOT Stroke Volume Index 25.3 mL/m2    LVOT Area 2.5 cm2    Qp:Qs ratio 0.99     LA Volume Index A-L A4C 12 (A) 16 - 34 mL/m2    LA Volume Index MOD A4C 12 (A) 16 - 34 ml/m2    LA Size Index 1.66 cm/m2    LA/AO Root Ratio 1.17     RVOT Area 3.8 cm2    RVOT Stroke Volume 42.2 mL    Ao Root Index 1.42 cm/m2    AV Velocity Ratio 0.69     LVOT:AV VTI Index 0.78     DILMA/BSA VTI 1.1 cm2/m2    DILMA/BSA Peak Velocity 1.1 cm2/m2   POCT Glucose    Collection Time:

## 2024-12-15 NOTE — PLAN OF CARE
Problem: Chronic Conditions and Co-morbidities  Goal: Patient's chronic conditions and co-morbidity symptoms are monitored and maintained or improved  12/14/2024 2308 by Brian Miller RN  Outcome: Progressing     Problem: Safety - Adult  Goal: Free from fall injury  12/14/2024 2308 by Brian Miller RN  Outcome: Progressing  Note: Patient is free from falls and self harm      Problem: Musculoskeletal - Adult  Goal: Return ADL status to a safe level of function  12/14/2024 2308 by Brian Miller RN  Outcome: Progressing  Note: Patient still told not to get up yet because of falls      Problem: Metabolic/Fluid and Electrolytes - Adult  Goal: Glucose maintained within prescribed range  12/14/2024 2308 by Brian Miller RN  Outcome: Progressing  Flowsheets (Taken 12/14/2024 2308)  Glucose maintained within prescribed range: Monitor blood glucose as ordered  Note: Patient blood glucose monitored as ordered   12/14/2024 2019 by Chely Jasso RN  Flowsheets (Taken 12/14/2024 0923)  Glucose maintained within prescribed range:   Monitor blood glucose as ordered   Assess for signs and symptoms of hyperglycemia and hypoglycemia  Note: Monitor pt's blood sugar and instruct pt on appropriate diet     Problem: Pain  Goal: Verbalizes/displays adequate comfort level or baseline comfort level  12/14/2024 2019 by Chely Jasso RN  Outcome: Completed  Flowsheets (Taken 12/14/2024 0823)  Verbalizes/displays adequate comfort level or baseline comfort level:   Encourage patient to monitor pain and request assistance   Assess pain using appropriate pain scale   Administer analgesics based on type and severity of pain and evaluate response  Note: Pt has no complaints of pain

## 2024-12-15 NOTE — PLAN OF CARE
Problem: Musculoskeletal - Adult  Goal: Return mobility to safest level of function  Flowsheets (Taken 12/14/2024 0923)  Return Mobility to Safest Level of Function:   Assess patient stability and activity tolerance for standing, transferring and ambulating with or without assistive devices   Assist with transfers and ambulation using safe patient handling equipment as needed   Instruct patient/family in ordered activity level   Obtain physical therapy/occupational therapy consults as needed  Goal: Return ADL status to a safe level of function  Flowsheets (Taken 12/14/2024 0923)  Return ADL Status to a Safe Level of Function: Assess activities of daily living deficits and provide assistive devices as needed     Problem: Metabolic/Fluid and Electrolytes - Adult  Goal: Electrolytes maintained within normal limits  Flowsheets (Taken 12/14/2024 0923)  Electrolytes maintained within normal limits:   Monitor labs and assess patient for signs and symptoms of electrolyte imbalances   Administer electrolyte replacement as ordered  Note: Replete pt's potassium as ordered  Goal: Glucose maintained within prescribed range  Flowsheets (Taken 12/14/2024 0923)  Glucose maintained within prescribed range:   Monitor blood glucose as ordered   Assess for signs and symptoms of hyperglycemia and hypoglycemia  Note: Monitor pt's blood sugar and instruct pt on appropriate diet     Problem: Chronic Conditions and Co-morbidities  Goal: Patient's chronic conditions and co-morbidity symptoms are monitored and maintained or improved  Outcome: Progressing  Flowsheets (Taken 12/14/2024 0823)  Care Plan - Patient's Chronic Conditions and Co-Morbidity Symptoms are Monitored and Maintained or Improved:   Monitor and assess patient's chronic conditions and comorbid symptoms for stability, deterioration, or improvement   Collaborate with multidisciplinary team to address chronic and comorbid conditions and prevent exacerbation or deterioration

## 2024-12-15 NOTE — PROGRESS NOTES
Progress Note  Date:12/15/2024       Room:[facility] [place]  Patient Name:Justine Velasco     YOB: 1955     Age:68 y.o.        Subjective    Subjective no change in leg weakness  Review of Systems  Objective         Vitals Last 24 Hours:  TEMPERATURE:  Temp  Av.4 °F (36.9 °C)  Min: 97.7 °F (36.5 °C)  Max: 98.9 °F (37.2 °C)  RESPIRATIONS RANGE: Resp  Av.6  Min: 17  Max: 18  PULSE OXIMETRY RANGE: SpO2  Av %  Min: 96 %  Max: 99 %  PULSE RANGE: Pulse  Av.4  Min: 71  Max: 97  BLOOD PRESSURE RANGE: Systolic (24hrs), Av , Min:115 , Max:165   ; Diastolic (24hrs), Av, Min:74, Max:108    I/O (24Hr):    Intake/Output Summary (Last 24 hours) at 12/15/2024 1526  Last data filed at 2024  Gross per 24 hour   Intake --   Output 800 ml   Net -800 ml     Objective    Exam  General: Awake, alert nad  Neuro: MS Aox3 language fluent, no neglect  CN: EOM intact Facial movements symmetric Hearing intact to voice Speech clear Tongue midline  Motor: Moves both upper ext spontaneously, minimal movement noted in lower ext  Labs/Imaging/Diagnostics    Labs:  CBC:  Recent Labs     24  1555 24  0509 12/15/24  0610   WBC 11.7 11.2 9.2   RBC 6.02* 5.44* 5.08   HGB 15.5 14.4 13.3   HCT 49.9* 45.8* 42.4   MCV 82.9 84.2 83.5   RDW 13.9 13.9 13.7    277 265     CHEMISTRIES:  Recent Labs     24  1555 24  0509 12/15/24  0610    147* 143   K 3.7 3.4* 3.5    109 109   CO2 28 32 27   BUN 28* 23* 22*   CREATININE 0.70 0.74 0.62   GLUCOSE 107* 93 79   PHOS  --  2.5  --    MG  --   --  2.0     PT/INR:No results for input(s): \"PROTIME\", \"INR\" in the last 72 hours.  APTT:No results for input(s): \"APTT\" in the last 72 hours.  LIVER PROFILE:  Recent Labs     24  1555   AST 16   ALT 18   BILITOT 1.0   ALKPHOS 86       Imaging Last 24 Hours:  Echo (TTE) complete (PRN contrast/bubble/strain/3D)    Result Date: 12/15/2024    Left Ventricle: Normal left ventricular systolic  explain sx upon my review.  Nl tsh/b12. Hgba1c 6.  Carotid u/s no high grade stenosis. Echo nl other than grade 1 diastolic dysfxn.  Pt will need pt/ot and snf vs inpt rehab. Outpt emg.  Sx progressive over several years.  Limited effort on exam. Significant deconditioning component.  Await official mr t/ls spine result, if no concerning process, dispo planning per primary team.     Will sign off. Pls call with further questions.  Thank you for allowing me to participate in the care of your patient.   Electronically signed by Eliezer Doll MD on 12/15/24 at 3:26 PM EST

## 2024-12-15 NOTE — PLAN OF CARE
Problem: Chronic Conditions and Co-morbidities  Goal: Patient's chronic conditions and co-morbidity symptoms are monitored and maintained or improved  12/15/2024 1611 by Chely Jasso RN  Note: Monitor pt's blood sugar and ensure it maintains in prescribed range, monitor pt's blood pressure and ensure it maintains in prescribed range  12/15/2024 1611 by Chely Jasso, RN  Outcome: Progressing  Flowsheets (Taken 12/15/2024 0800)  Care Plan - Patient's Chronic Conditions and Co-Morbidity Symptoms are Monitored and Maintained or Improved:   Monitor and assess patient's chronic conditions and comorbid symptoms for stability, deterioration, or improvement   Collaborate with multidisciplinary team to address chronic and comorbid conditions and prevent exacerbation or deterioration   Update acute care plan with appropriate goals if chronic or comorbid symptoms are exacerbated and prevent overall improvement and discharge     Problem: Discharge Planning  Goal: Discharge to home or other facility with appropriate resources  12/15/2024 1611 by Chely Jasso, RN  Note: Identify pt's discharge need  12/15/2024 1611 by Chely Jasso, RN  Outcome: Progressing  Flowsheets (Taken 12/15/2024 0800)  Discharge to home or other facility with appropriate resources:   Identify barriers to discharge with patient and caregiver   Arrange for needed discharge resources and transportation as appropriate   Identify discharge learning needs (meds, wound care, etc)     Problem: Safety - Adult  Goal: Free from fall injury  12/15/2024 1611 by Chely Jasso, RN  Note: [facility] fall risk precautions   12/15/2024 1611 by Chely Jasso, RN  Outcome: Progressing     Problem: Musculoskeletal - Adult  Goal: Return mobility to safest level of function  12/15/2024 1611 by Chely Jasso, RN  Note: Monitor pt's ability to transfer  12/15/2024 1611 by Chely Jasso, RN  Outcome: Progressing  Flowsheets (Taken 12/15/2024 0800)  Return Mobility to Safest Level of

## 2024-12-15 NOTE — PROGRESS NOTES
4 Eyes Skin Assessment     NAME:  Justine Velasco  YOB: 1955  MEDICAL RECORD NUMBER:  [id number]    The patient is being assessed for  Shift Handoff    I agree that at least one RN has performed a thorough Head to Toe Skin Assessment on the patient. ALL assessment sites listed below have been assessed.      Areas assessed by both nurses:    Head, Face, Ears, Shoulders, Back, Chest, Arms, Elbows, Hands, Sacrum. Buttock, Coccyx, Ischium, and Legs. Feet and Heels        Does the Patient have a Wound? No noted wound(s)       Haider Prevention initiated by RN: No  Wound Care Orders initiated by RN: No    Pressure Injury (Stage 3,4, Unstageable, DTI, NWPT, and Complex wounds) if present, place Wound referral order by RN under : No    New Ostomies, if present place, Ostomy referral order under : No     Nurse 1 eSignature: Electronically signed by Chely Jasso RN on 12/14/24 at 8:37 PM EST    **SHARE this note so that the co-signing nurse can place an eSignature**    Nurse 2 eSignature: Electronically signed by ANAMARIA JUÁREZ RN on 12/15/24 at 8:06 PM EST

## 2024-12-16 LAB
ANION GAP SERPL CALC-SCNC: 3 MMOL/L (ref 3–18)
BACTERIA SPEC CULT: NORMAL
BUN SERPL-MCNC: 20 MG/DL (ref 7–18)
BUN/CREAT SERPL: 33 (ref 12–20)
CA-I SERPL-SCNC: 1.3 MMOL/L (ref 1.15–1.33)
CALCIUM SERPL-MCNC: 8.9 MG/DL (ref 8.5–10.1)
CHLORIDE SERPL-SCNC: 108 MMOL/L (ref 100–111)
CO2 SERPL-SCNC: 28 MMOL/L (ref 21–32)
CREAT SERPL-MCNC: 0.6 MG/DL (ref 0.6–1.3)
GLUCOSE BLD STRIP.AUTO-MCNC: 88 MG/DL (ref 70–110)
GLUCOSE BLD STRIP.AUTO-MCNC: 91 MG/DL (ref 70–110)
GLUCOSE BLD STRIP.AUTO-MCNC: 93 MG/DL (ref 70–110)
GLUCOSE BLD STRIP.AUTO-MCNC: 97 MG/DL (ref 70–110)
GLUCOSE SERPL-MCNC: 85 MG/DL (ref 74–99)
POTASSIUM SERPL-SCNC: 3.1 MMOL/L (ref 3.5–5.5)
SERVICE CMNT-IMP: NORMAL
SODIUM SERPL-SCNC: 139 MMOL/L (ref 136–145)

## 2024-12-16 PROCEDURE — 1100000003 HC PRIVATE W/ TELEMETRY

## 2024-12-16 PROCEDURE — 82330 ASSAY OF CALCIUM: CPT

## 2024-12-16 PROCEDURE — 2580000003 HC RX 258: Performed by: HOSPITALIST

## 2024-12-16 PROCEDURE — 82962 GLUCOSE BLOOD TEST: CPT

## 2024-12-16 PROCEDURE — 99232 SBSQ HOSP IP/OBS MODERATE 35: CPT | Performed by: FAMILY MEDICINE

## 2024-12-16 PROCEDURE — 2580000003 HC RX 258: Performed by: STUDENT IN AN ORGANIZED HEALTH CARE EDUCATION/TRAINING PROGRAM

## 2024-12-16 PROCEDURE — 6370000000 HC RX 637 (ALT 250 FOR IP): Performed by: PHYSICIAN ASSISTANT

## 2024-12-16 PROCEDURE — 94761 N-INVAS EAR/PLS OXIMETRY MLT: CPT

## 2024-12-16 PROCEDURE — 6370000000 HC RX 637 (ALT 250 FOR IP): Performed by: STUDENT IN AN ORGANIZED HEALTH CARE EDUCATION/TRAINING PROGRAM

## 2024-12-16 PROCEDURE — 36415 COLL VENOUS BLD VENIPUNCTURE: CPT

## 2024-12-16 PROCEDURE — 6360000002 HC RX W HCPCS: Performed by: STUDENT IN AN ORGANIZED HEALTH CARE EDUCATION/TRAINING PROGRAM

## 2024-12-16 PROCEDURE — 97530 THERAPEUTIC ACTIVITIES: CPT

## 2024-12-16 PROCEDURE — 80048 BASIC METABOLIC PNL TOTAL CA: CPT

## 2024-12-16 PROCEDURE — 97535 SELF CARE MNGMENT TRAINING: CPT

## 2024-12-16 RX ADMIN — POLYETHYLENE GLYCOL 3350 17 G: 17 POWDER, FOR SOLUTION ORAL at 08:00

## 2024-12-16 RX ADMIN — SODIUM CHLORIDE: 4.5 INJECTION, SOLUTION INTRAVENOUS at 10:56

## 2024-12-16 RX ADMIN — ENOXAPARIN SODIUM 40 MG: 100 INJECTION SUBCUTANEOUS at 08:55

## 2024-12-16 RX ADMIN — INSULIN GLARGINE 10 UNITS: 100 INJECTION, SOLUTION SUBCUTANEOUS at 21:30

## 2024-12-16 RX ADMIN — LOSARTAN POTASSIUM 100 MG: 50 TABLET, FILM COATED ORAL at 08:54

## 2024-12-16 RX ADMIN — SODIUM CHLORIDE, PRESERVATIVE FREE 10 ML: 5 INJECTION INTRAVENOUS at 21:33

## 2024-12-16 RX ADMIN — FAMOTIDINE 20 MG: 20 TABLET ORAL at 21:32

## 2024-12-16 RX ADMIN — FAMOTIDINE 20 MG: 20 TABLET ORAL at 08:54

## 2024-12-16 RX ADMIN — ASPIRIN 81 MG: 81 TABLET, COATED ORAL at 08:54

## 2024-12-16 ASSESSMENT — PAIN SCALES - GENERAL
PAINLEVEL_OUTOF10: 0
PAINLEVEL_OUTOF10: 0

## 2024-12-16 NOTE — PLAN OF CARE
Problem: Physical Therapy - Adult  Goal: By Discharge: Performs mobility at highest level of function for planned discharge setting.  See evaluation for individualized goals.  Description: Physical Therapy Goals:  Initiated 12/13/2024 to be met within 7-10 days.    1.  Patient will move from supine to sit and sit to supine  in bed with contact guard assistance.    2.  Patient will transfer from bed to chair and chair to bed with minimal assistance using the least restrictive device.  3.  Patient will perform sit to stand with minimal assistance.  4.  Patient will ambulate with minimal assistance for 20 feet with the least restrictive device.   5.  Patient will ascend/descend 3 stairs with 2 handrail(s) with moderate assistance.    PLOF:  ( works during the day), 12 steps to enter, ambulates with RW, has had multiple falls in the last year     Outcome: Progressing     PHYSICAL THERAPY TREATMENT    Patient: Justine Velasco (68 y.o. female)  Date: 12/16/2024  Diagnosis: Gait disorder [R26.9]  Neuromuscular weakness (HCC) [G70.9]  Frequent falls [R29.6]  Left leg injury, initial encounter [S89.92XA]  Cerebrovascular accident (CVA), unspecified mechanism (HCC) [I63.9] Multifactorial gait disorder      Precautions: Fall Risk, General Precautions     ASSESSMENT:  Pt received in bed in NAD and agreeable. Pt requires increased time with all functional mobility. Pt reports needing to use the bathroom thus BSC placed next to bed. Instructed pt in SPT bed > BSC with hand placement however pt resistant upon standing and pushes self back onto bed after 2 attempts and reports she is fearful of falling. Given reassurance of her safety and instruction in what we were going to be doing. Unable to progress further OOB this date. Pt c/o sacral pain from falling throughout all mobility, not rated. Positioned for comfort with all needs met. Will continue to follow per POC.     Progression toward goals:   [x]      Improving

## 2024-12-16 NOTE — PROGRESS NOTES
Received report from JUSTIN Hall. Pt AAOx3, NAD, breathing non labored, on room air, HOB up. IV site clean, dry and intact. IVF going per order. FC in place. Bed at the lowest level on lock position, call bell w/i reach. Bed alarm on.    4 Eyes Skin Assessment     NAME:  Justine Velasco  YOB: 1955  MEDICAL RECORD NUMBER:  [id number]    The patient is being assessed for  Shift Handoff    I agree that at least one RN has performed a thorough Head to Toe Skin Assessment on the patient. ALL assessment sites listed below have been assessed.      Areas assessed by both nurses:    Head, Face, Ears, Shoulders, Back, Chest, Arms, Elbows, Hands, Sacrum. Buttock, Coccyx, Ischium, and Legs. Feet and Heels        Does the Patient have a Wound? No noted wound(s)       Haider Prevention initiated by RN: Yes  Wound Care Orders initiated by RN: No    Pressure Injury (Stage 3,4, Unstageable, DTI, NWPT, and Complex wounds) if present, place Wound referral order by RN under : No    New Ostomies, if present place, Ostomy referral order under : No     Nurse 1 eSignature: Electronically signed by ANAMARIA JUÁREZ RN on 12/15/24 at 8:02 PM EST    **SHARE this note so that the co-signing nurse can place an eSignature**    Nurse 2 eSignature: Electronically signed by Chely Jasso RN on 12/15/24 at 8:31 PM EST

## 2024-12-16 NOTE — CARE COORDINATION
Went to patient room , HIPAA verified. She completes the SNF Choice List with this CM.    1.) [facility]  2.) [facility]  3.) [facility]  4.) [facility].     She had no further questions or concerns, left in bed, lowest locked position, call bell in reach.

## 2024-12-16 NOTE — PROGRESS NOTES
IntraVENous PRN    glucagon injection 1 mg  1 mg SubCUTAneous PRN    dextrose 10 % infusion   IntraVENous Continuous PRN    insulin glargine (LANTUS) injection vial 10 Units  10 Units SubCUTAneous Nightly    insulin lispro (HUMALOG,ADMELOG) injection vial 0-4 Units  0-4 Units SubCUTAneous 4x Daily AC & HS    sodium chloride flush 0.9 % injection 5-40 mL  5-40 mL IntraVENous 2 times per day    sodium chloride flush 0.9 % injection 5-40 mL  5-40 mL IntraVENous PRN    0.9 % sodium chloride infusion   IntraVENous PRN    potassium chloride (KLOR-CON M) extended release tablet 40 mEq  40 mEq Oral PRN    Or    potassium bicarb-citric acid (EFFER-K) effervescent tablet 40 mEq  40 mEq Oral PRN    Or    potassium chloride 10 mEq/100 mL IVPB (Peripheral Line)  10 mEq IntraVENous PRN    magnesium sulfate 2000 mg in 50 mL IVPB premix  2,000 mg IntraVENous PRN    enoxaparin (LOVENOX) injection 40 mg  40 mg SubCUTAneous Daily    ondansetron (ZOFRAN-ODT) disintegrating tablet 4 mg  4 mg Oral Q8H PRN    Or    ondansetron (ZOFRAN) injection 4 mg  4 mg IntraVENous Q6H PRN    polyethylene glycol (GLYCOLAX) packet 17 g  17 g Oral Daily PRN    acetaminophen (TYLENOL) tablet 650 mg  650 mg Oral Q6H PRN    Or    acetaminophen (TYLENOL) suppository 650 mg  650 mg Rectal Q6H PRN    aspirin EC tablet 81 mg  81 mg Oral Daily    losartan (COZAAR) tablet 100 mg  100 mg Oral Daily        Labs:    Recent Results (from the past 24 hour(s))   POCT Glucose    Collection Time: 12/15/24 11:46 AM   Result Value Ref Range    POC Glucose 91 70 - 110 mg/dL   POCT Glucose    Collection Time: 12/15/24  4:29 PM   Result Value Ref Range    POC Glucose 110 70 - 110 mg/dL   POCT Glucose    Collection Time: 12/15/24  9:08 PM   Result Value Ref Range    POC Glucose 109 70 - 110 mg/dL   Basic Metabolic Panel    Collection Time: 12/16/24  2:15 AM   Result Value Ref Range    Sodium 139 136 - 145 mmol/L    Potassium 3.1 (L) 3.5 - 5.5 mmol/L    Chloride 108 100 - 111  mmol/L    CO2 28 21 - 32 mmol/L    Anion Gap 3 3.0 - 18 mmol/L    Glucose 85 74 - 99 mg/dL    BUN 20 (H) 7.0 - 18 MG/DL    Creatinine 0.60 0.6 - 1.3 MG/DL    BUN/Creatinine Ratio 33 (H) 12 - 20      Est, Glom Filt Rate >90 >60 ml/min/1.73m2    Calcium 8.9 8.5 - 10.1 MG/DL   Calcium, Ionized    Collection Time: 12/16/24  2:15 AM   Result Value Ref Range    Calcium, Ionized 1.30 1.15 - 1.33 MMOL/L   POCT Glucose    Collection Time: 12/16/24  6:36 AM   Result Value Ref Range    POC Glucose 88 70 - 110 mg/dL         Signed By: Khalif Esposito MD

## 2024-12-16 NOTE — PLAN OF CARE
Problem: Chronic Conditions and Co-morbidities  Goal: Patient's chronic conditions and co-morbidity symptoms are monitored and maintained or improved  12/16/2024 0131 by Mary Ellen Bartlett, RN  Outcome: Progressing  Flowsheets  Taken 12/16/2024 0131  Care Plan - Patient's Chronic Conditions and Co-Morbidity Symptoms are Monitored and Maintained or Improved:   Monitor and assess patient's chronic conditions and comorbid symptoms for stability, deterioration, or improvement   Collaborate with multidisciplinary team to address chronic and comorbid conditions and prevent exacerbation or deterioration   Update acute care plan with appropriate goals if chronic or comorbid symptoms are exacerbated and prevent overall improvement and discharge  Taken 12/15/2024 2000  Care Plan - Patient's Chronic Conditions and Co-Morbidity Symptoms are Monitored and Maintained or Improved:   Monitor and assess patient's chronic conditions and comorbid symptoms for stability, deterioration, or improvement   Collaborate with multidisciplinary team to address chronic and comorbid conditions and prevent exacerbation or deterioration   Update acute care plan with appropriate goals if chronic or comorbid symptoms are exacerbated and prevent overall improvement and discharge     Problem: Discharge Planning  Goal: Discharge to home or other facility with appropriate resources  12/16/2024 0131 by Mary Ellen Bartlett, RN  Outcome: Progressing  Flowsheets  Taken 12/16/2024 0131  Discharge to home or other facility with appropriate resources:   Identify barriers to discharge with patient and caregiver   Identify discharge learning needs (meds, wound care, etc)   Refer to discharge planning if patient needs post-hospital services based on physician order or complex needs related to functional status, cognitive ability or social support system   Arrange for needed discharge resources and transportation as appropriate  Taken 12/15/2024 2000  Discharge to home

## 2024-12-16 NOTE — PROGRESS NOTES
met patient at bedside for an initial visit.    Patient said she is having trouble walking and her blood pressure has been high.Patient said she is trying to make it. Patient thanked  for the visit.     provided presence and support for patient.    Chaplains will provide follow-up care for patient and family as needed.    Spiritual Health History and Assessment/Progress Note  [facility]    Spiritual/Emotional Needs,  ,  ,      Name: Justine Velasco MRN: [id number]    Age: 68 y.o.     Sex: female   Language: English   Mormonism: Non-Orthodox   Multifactorial gait disorder     Date: 12/16/2024            Total Time Calculated: 10 min              Spiritual Assessment began in [facility] MED[facility]            Encounter Overview/Reason: Spiritual/Emotional Needs  Service Provided For: Patient    Nu, Belief, Meaning:   Patient identifies as spiritual, is connected with a nu tradition or spiritual practice, and has beliefs or practices that help with coping during difficult times  Family/Friends No family/friends present      Importance and Influence:  Patient unable to assess at this time  Family/Friends No family/friends present    Community:  Patient feels well-supported. Support system includes: Spouse/Partner  Family/Friends feel well-supported. Support system includes: Children    Assessment and Plan of Care:     Patient Interventions include: Facilitated expression of thoughts and feelings, Explored spiritual coping/struggle/distress, and Affirmed coping skills/support systems  Family/Friends Interventions include: Facilitated expression of thoughts and feelings, Explored spiritual coping/struggle/distress, and Affirmed coping skills/support systems    Patient Plan of Care: No spiritual needs identified for follow-up  Family/Friends Plan of Care: No spiritual needs identified for follow-up    Electronically signed by Chaplain Macey on 12/16/2024 at 1:27 PM

## 2024-12-16 NOTE — PLAN OF CARE
treatment:   []  Patient left in no apparent distress sitting up in chair  [x]  Patient left in no apparent distress in bed  [x]  Call bell left within reach  [x]  Nursing notified  []  Caregiver present  [x]  Bed alarm activated    COMMUNICATION/EDUCATION:   Patient Education  Education Given To: Patient  Education Provided: Role of Therapy;Plan of Care;ADL Adaptive Strategies;Precautions;Home Exercise Program;Fall Prevention Strategies;Energy Conservation;Transfer Training  Education Method: Demonstration;Verbal;Teach Back  Barriers to Learning: None  Education Outcome: Verbalized understanding;Continued education needed      Thank you for this referral.  SOFIA Hay  Minutes: 25

## 2024-12-16 NOTE — CARDIO/PULMONARY
Met with Patient at bedside. CM introduces self and explains role. Patient is alert and oriented x 4. HIPAA verified. This CM advises that her SNF Choice of [facility] has declined her as they are out of network with her Health Insurance, This CM provides a SNF star Rated Choice letter and List, reviewed with the patient. She is undecided on which SNF to choose at this time. This CM encourages her to choose at least three top choices. Advised will send a SNF referral blast, we can review after this CM returns from the 1420 LOS meeting. Patient agrees.     White board in room updated. Patient denies any immediate needs or concerns. Left in bed, lowest locked position, call bell in reach

## 2024-12-16 NOTE — CASE COMMUNICATION
[facility] does not accept BCBS medicare, primary CM made aware via perfect serve.    Tiffany TUTTLEN RN  Case Management  [phone]

## 2024-12-16 NOTE — PROGRESS NOTES
mL IntraVENous PRN    Or    dextrose bolus 10% 250 mL  250 mL IntraVENous PRN    glucagon injection 1 mg  1 mg SubCUTAneous PRN    dextrose 10 % infusion   IntraVENous Continuous PRN    insulin glargine (LANTUS) injection vial 10 Units  10 Units SubCUTAneous Nightly    insulin lispro (HUMALOG,ADMELOG) injection vial 0-4 Units  0-4 Units SubCUTAneous 4x Daily AC & HS    sodium chloride flush 0.9 % injection 5-40 mL  5-40 mL IntraVENous 2 times per day    sodium chloride flush 0.9 % injection 5-40 mL  5-40 mL IntraVENous PRN    0.9 % sodium chloride infusion   IntraVENous PRN    potassium chloride (KLOR-CON M) extended release tablet 40 mEq  40 mEq Oral PRN    Or    potassium bicarb-citric acid (EFFER-K) effervescent tablet 40 mEq  40 mEq Oral PRN    Or    potassium chloride 10 mEq/100 mL IVPB (Peripheral Line)  10 mEq IntraVENous PRN    magnesium sulfate 2000 mg in 50 mL IVPB premix  2,000 mg IntraVENous PRN    enoxaparin (LOVENOX) injection 40 mg  40 mg SubCUTAneous Daily    ondansetron (ZOFRAN-ODT) disintegrating tablet 4 mg  4 mg Oral Q8H PRN    Or    ondansetron (ZOFRAN) injection 4 mg  4 mg IntraVENous Q6H PRN    polyethylene glycol (GLYCOLAX) packet 17 g  17 g Oral Daily PRN    acetaminophen (TYLENOL) tablet 650 mg  650 mg Oral Q6H PRN    Or    acetaminophen (TYLENOL) suppository 650 mg  650 mg Rectal Q6H PRN    aspirin EC tablet 81 mg  81 mg Oral Daily    losartan (COZAAR) tablet 100 mg  100 mg Oral Daily        Labs:    Recent Results (from the past 24 hour(s))   POCT Glucose    Collection Time: 12/15/24  4:29 PM   Result Value Ref Range    POC Glucose 110 70 - 110 mg/dL   POCT Glucose    Collection Time: 12/15/24  9:08 PM   Result Value Ref Range    POC Glucose 109 70 - 110 mg/dL   Basic Metabolic Panel    Collection Time: 12/16/24  2:15 AM   Result Value Ref Range    Sodium 139 136 - 145 mmol/L    Potassium 3.1 (L) 3.5 - 5.5 mmol/L    Chloride 108 100 - 111 mmol/L    CO2 28 21 - 32 mmol/L    Anion Gap 3 3.0

## 2024-12-16 NOTE — CARE COORDINATION
No response form [facility] in [facility], call to     Conc[facility] Francisco    Basic Information    Address:  [place] [address]  [facility]  Phone:  [phone]  Fax:  [phone]    Spoke to Fatoumata, patient HIPAA verified, she states unfortunately they do not have any bed availability until next week. Call Concluded.    Patients 2cd Choice of :   [facility]    Basic Information    Address:  [facility] [place] [address]  [facility]  Phone:  [phone]  Fax:  [phone]   Laura Bland  [facility]. - 02:21 PM  Decline reason: No Bed Available    3rd Choice of  [facility]    Basic Information    Address:  [facility] [place] [address]  [facility]  Phone:  [phone]  Fax:  [phone]  Has accepted in [facility].   Call to Elba Johnson @ [phone], patient HIPAA verified. She states she will start Auth for this patient in the AM.

## 2024-12-17 LAB
ANION GAP SERPL CALC-SCNC: 4 MMOL/L (ref 3–18)
BUN SERPL-MCNC: 15 MG/DL (ref 7–18)
BUN/CREAT SERPL: 26 (ref 12–20)
CALCIUM SERPL-MCNC: 8.9 MG/DL (ref 8.5–10.1)
CHLORIDE SERPL-SCNC: 109 MMOL/L (ref 100–111)
CO2 SERPL-SCNC: 27 MMOL/L (ref 21–32)
CREAT SERPL-MCNC: 0.58 MG/DL (ref 0.6–1.3)
GLUCOSE BLD STRIP.AUTO-MCNC: 103 MG/DL (ref 70–110)
GLUCOSE BLD STRIP.AUTO-MCNC: 111 MG/DL (ref 70–110)
GLUCOSE BLD STRIP.AUTO-MCNC: 74 MG/DL (ref 70–110)
GLUCOSE BLD STRIP.AUTO-MCNC: 76 MG/DL (ref 70–110)
GLUCOSE SERPL-MCNC: 81 MG/DL (ref 74–99)
POTASSIUM SERPL-SCNC: 3.1 MMOL/L (ref 3.5–5.5)
SODIUM SERPL-SCNC: 140 MMOL/L (ref 136–145)

## 2024-12-17 PROCEDURE — 99232 SBSQ HOSP IP/OBS MODERATE 35: CPT | Performed by: FAMILY MEDICINE

## 2024-12-17 PROCEDURE — 6370000000 HC RX 637 (ALT 250 FOR IP): Performed by: PHYSICIAN ASSISTANT

## 2024-12-17 PROCEDURE — 1100000003 HC PRIVATE W/ TELEMETRY

## 2024-12-17 PROCEDURE — 6370000000 HC RX 637 (ALT 250 FOR IP): Performed by: STUDENT IN AN ORGANIZED HEALTH CARE EDUCATION/TRAINING PROGRAM

## 2024-12-17 PROCEDURE — 97535 SELF CARE MNGMENT TRAINING: CPT

## 2024-12-17 PROCEDURE — 36415 COLL VENOUS BLD VENIPUNCTURE: CPT

## 2024-12-17 PROCEDURE — 97530 THERAPEUTIC ACTIVITIES: CPT

## 2024-12-17 PROCEDURE — 80048 BASIC METABOLIC PNL TOTAL CA: CPT

## 2024-12-17 PROCEDURE — 6360000002 HC RX W HCPCS: Performed by: STUDENT IN AN ORGANIZED HEALTH CARE EDUCATION/TRAINING PROGRAM

## 2024-12-17 PROCEDURE — 2500000003 HC RX 250 WO HCPCS: Performed by: STUDENT IN AN ORGANIZED HEALTH CARE EDUCATION/TRAINING PROGRAM

## 2024-12-17 PROCEDURE — 94761 N-INVAS EAR/PLS OXIMETRY MLT: CPT

## 2024-12-17 PROCEDURE — 82962 GLUCOSE BLOOD TEST: CPT

## 2024-12-17 RX ADMIN — ASPIRIN 81 MG: 81 TABLET, COATED ORAL at 09:25

## 2024-12-17 RX ADMIN — FAMOTIDINE 20 MG: 20 TABLET ORAL at 09:26

## 2024-12-17 RX ADMIN — LOSARTAN POTASSIUM 100 MG: 50 TABLET, FILM COATED ORAL at 09:25

## 2024-12-17 RX ADMIN — ENOXAPARIN SODIUM 40 MG: 100 INJECTION SUBCUTANEOUS at 09:25

## 2024-12-17 RX ADMIN — SODIUM CHLORIDE, PRESERVATIVE FREE 10 ML: 5 INJECTION INTRAVENOUS at 21:10

## 2024-12-17 RX ADMIN — INSULIN GLARGINE 10 UNITS: 100 INJECTION, SOLUTION SUBCUTANEOUS at 21:10

## 2024-12-17 RX ADMIN — FAMOTIDINE 20 MG: 20 TABLET ORAL at 21:10

## 2024-12-17 RX ADMIN — POLYETHYLENE GLYCOL 3350 17 G: 17 POWDER, FOR SOLUTION ORAL at 09:25

## 2024-12-17 ASSESSMENT — PAIN SCALES - GENERAL: PAINLEVEL_OUTOF10: 0

## 2024-12-17 NOTE — PROGRESS NOTES
4 Eyes Skin Assessment     NAME:  Justine Velasco  YOB: 1955  MEDICAL RECORD NUMBER:  [id number]    The patient is being assessed for  Shift Handoff    I agree that at least one RN has performed a thorough Head to Toe Skin Assessment on the patient. ALL assessment sites listed below have been assessed.      Areas assessed by both nurses:    Head, Face, Ears, Shoulders, Back, Chest, Arms, Elbows, Hands, Sacrum. Buttock, Coccyx, Ischium, Legs. Feet and Heels, and Under Medical Devices         Does the Patient have a Wound? No noted wound(s)       Haider Prevention initiated by RN: No  Wound Care Orders initiated by RN: No    Pressure Injury (Stage 3,4, Unstageable, DTI, NWPT, and Complex wounds) if present, place Wound referral order by RN under : No    New Ostomies, if present place, Ostomy referral order under : No     Nurse 1 eSignature: {Esignature:[id number]}    **SHARE this note so that the co-signing nurse can place an eSignature**    Nurse 2 eSignature: {Esignature:[id number]}

## 2024-12-17 NOTE — PLAN OF CARE
Problem: Occupational Therapy - Adult  Goal: By Discharge: Performs self-care activities at highest level of function for planned discharge setting.  See evaluation for individualized goals.  Description: Occupational Therapy Goals:  Initiated 12/13/2024 to be met within 7-10 days.    1.  Patient will perform bed mobility in preparation for ADLs with minimal assistance.   2.  Patient will perform lower body dressing with minimal assistance.  3.  Patient will perform grooming  with supervision/set-up standing, F balance.  4.  Patient will perform toilet transfers with minimal assistance  5.  Patient will perform all aspects of toileting with minimal assistance.  6.  Patient will participate in upper extremity therapeutic exercise/activities with supervision/set-up for 8-10 minutes to increase strength/endurance for ADLs.    7.  Patient will utilize energy conservation techniques during functional activities with verbal cues.    PLOF: Pt was modified independent with self-care and used a RW for functional mobility    Outcome: Progressing   OCCUPATIONAL THERAPY TREATMENT    Patient: Justine Velasco (69 y.o. female)  Date: 12/17/2024  Diagnosis: Gait disorder [R26.9]  Neuromuscular weakness (HCC) [G70.9]  Frequent falls [R29.6]  Left leg injury, initial encounter [S89.92XA]  Cerebrovascular accident (CVA), unspecified mechanism (HCC) [I63.9] Multifactorial gait disorder      Precautions: Fall Risk, General Precautions    Chart, occupational therapy assessment, plan of care, and goals were reviewed.  ASSESSMENT:  Co-treated w/PT part for the session for increase safety w/functional transfer training 2/2 pt fear of falling. Pt requires increase time w/bed mobility to maneuver to EOB /2 BLE weakness. Educated on use of side rails to increase independence. Educated on importance of hand placement and rocking technique w/STS. Pt requires 2 person assist w/STS and assist w/RW mgt 2/2 safety awareness. Pt performs ADL grooming

## 2024-12-17 NOTE — PLAN OF CARE
Problem: Occupational Therapy - Adult  Goal: By Discharge: Performs self-care activities at highest level of function for planned discharge setting.  See evaluation for individualized goals.  Description: Occupational Therapy Goals:  Initiated 12/13/2024 to be met within 7-10 days.    1.  Patient will perform bed mobility in preparation for ADLs with minimal assistance.   2.  Patient will perform lower body dressing with minimal assistance.  3.  Patient will perform grooming  with supervision/set-up standing, F balance.  4.  Patient will perform toilet transfers with minimal assistance  5.  Patient will perform all aspects of toileting with minimal assistance.  6.  Patient will participate in upper extremity therapeutic exercise/activities with supervision/set-up for 8-10 minutes to increase strength/endurance for ADLs.    7.  Patient will utilize energy conservation techniques during functional activities with verbal cues.      PLOF: Pt was modified independent with self-care and used a RW for functional mobility  12/16/2024 1513 by Rose Ratliff OTA  Outcome: Progressing     Problem: Physical Therapy - Adult  Goal: By Discharge: Performs mobility at highest level of function for planned discharge setting.  See evaluation for individualized goals.  Description: Physical Therapy Goals:  Initiated 12/13/2024 to be met within 7-10 days.    1.  Patient will move from supine to sit and sit to supine  in bed with contact guard assistance.    2.  Patient will transfer from bed to chair and chair to bed with minimal assistance using the least restrictive device.  3.  Patient will perform sit to stand with minimal assistance.  4.  Patient will ambulate with minimal assistance for 20 feet with the least restrictive device.   5.  Patient will ascend/descend 3 stairs with 2 handrail(s) with moderate assistance.    PLOF:  ( works during the day), 12 steps to enter, ambulates with RW, has had multiple falls

## 2024-12-17 NOTE — PROGRESS NOTES
[facility] Hospitalist Group  Progress Note    Patient: Justine Velasco Age: 69 y.o. : 1955 MR#: [id number] SSN: [id number]      Subjective/24-hour events:     Other than feeling constipated, no new complaints.    Assessment:   Lower extremity weakness and ataxia with recurrent falls, multifactorial  Mild hypernatremia  Hypokalemia  Mild hypercalcemia  Diabetes mellitus type 2  Hypertension    Plan:   Continue current supportive medical management as ordered.  Patient medically stable for discharge once disposition arrangements finalized.  SNF being worked on.  Updated patient at bedside and brother by phone on plan.  All current questions answered.    Anticipated discharge: Medically stable.  SNF    Case discussed with:  [x]Patient  []Family  [x] Nursing  [x]Case Management  DVT Prophylaxis:  [x]Lovenox  []Hep SQ  []SCDs  []Coumadin   []On Heparin gtt []PO anticoagulant    Objective:   VS: BP (!) 147/91   Pulse 86   Temp 98.6 °F (37 °C) (Oral)   Resp 17   Ht 1.676 m (5' 5.98\")   Wt 61.2 kg (134 lb 14.7 oz)   SpO2 98%   BMI 21.79 kg/m²      Tmax/24hrs: Temp (24hrs), Av.2 °F (36.8 °C), Min:97.3 °F (36.3 °C), Max:98.6 °F (37 °C)    Intake/Output Summary (Last 24 hours) at 2024 0753  Last data filed at 2024 0650  Gross per 24 hour   Intake --   Output 1725 ml   Net -1725 ml       Gen:  In NAD.  Lungs: Clear, no wheezes  Effort nonlabored.  CV: RRR.  Abdomen: Soft, NTTP.  Extremities: Warm, no pitting edema or ischemia.  Neuro:  Awake and alert, moves extremities spontaneously.    Current Facility-Administered Medications   Medication Dose Route Frequency    0.45 % sodium chloride infusion   IntraVENous Continuous    famotidine (PEPCID) tablet 20 mg  20 mg Oral BID    glucose chewable tablet 16 g  4 tablet Oral PRN    dextrose bolus 10% 125 mL  125 mL IntraVENous PRN    Or    dextrose bolus 10% 250 mL  250 mL IntraVENous PRN    glucagon injection 1 mg  1 mg

## 2024-12-17 NOTE — PLAN OF CARE
Problem: Physical Therapy - Adult  Goal: By Discharge: Performs mobility at highest level of function for planned discharge setting.  See evaluation for individualized goals.  Description: Physical Therapy Goals:  Initiated 12/13/2024 to be met within 7-10 days.    1.  Patient will move from supine to sit and sit to supine  in bed with contact guard assistance.    2.  Patient will transfer from bed to chair and chair to bed with minimal assistance using the least restrictive device.  3.  Patient will perform sit to stand with minimal assistance.  4.  Patient will ambulate with minimal assistance for 20 feet with the least restrictive device.   5.  Patient will ascend/descend 3 stairs with 2 handrail(s) with moderate assistance.    PLOF:  ( works during the day), 12 steps to enter, ambulates with RW, has had multiple falls in the last year     12/17/2024 1521 by Anastasiia Lr, PT  Outcome: Progressing    PHYSICAL THERAPY TREATMENT    Patient: Jusitne Velasco (69 y.o. female)  Date: 12/17/2024  Diagnosis: Gait disorder [R26.9]  Neuromuscular weakness (HCC) [G70.9]  Frequent falls [R29.6]  Left leg injury, initial encounter [S89.92XA]  Cerebrovascular accident (CVA), unspecified mechanism (HCC) [I63.9] Multifactorial gait disorder      Precautions: Fall Risk, General Precautions      ASSESSMENT:  Pt received in bed and agreeable, seen with OT to maximize functional mobility and safety. Pt with fear of falling with transfers. Slow shuffled gait with min A to turn x 5 ft to sit in recliner. Pt sits prematurely into recliner due to fatigue and lowered into recliner safely with mod A. Educated on safety techniques prior to sitting to prevent falls. Pt left in recliner with LE elevated and all needs met. Chair alarm on. Will continue to follow per POC.     Progression toward goals:   [x]      Improving appropriately and progressing toward goals  []      Improving slowly and progressing toward goals  []       Not making progress toward goals and plan of care will be adjusted     PLAN:  Patient continues to benefit from skilled intervention to address the above impairments.  Continue treatment per established plan of care.    Further Equipment Recommendations for Discharge: LAYNE JOINER    [facility]: AM-PAC Inpatient Mobility Raw Score : 16      Current research shows that an AM-PAC score of 17 (13 without stairs) or less is not associated with a discharge to the patient's home setting.    This AMPA score should be considered in conjunction with interdisciplinary team recommendations to determine the most appropriate discharge setting. Patient's social support, diagnosis, medical stability, and prior level of function should also be taken into consideration.     SUBJECTIVE:   Patient stated, \"I am doing better today.\"    OBJECTIVE DATA SUMMARY:   Critical Behavior:  Orientation  Overall Orientation Status: Within Functional Limits  Orientation Level: Oriented to place;Oriented to person       Functional Mobility Training:  Bed Mobility:  Bed Mobility Training  Bed Mobility Training: Yes  Supine to Sit: Additional time;Moderate assistance  Scooting: Minimum assistance  Transfers:  Transfer Training  Transfer Training: Yes  Sit to Stand: Minimum assistance;Assist X2  Bed to Chair: Minimum assistance;Assist X2  Balance:  Balance  Sitting: Impaired  Sitting - Static: Fair (occasional)  Sitting - Dynamic: Fair (occasional)  Standing: Impaired;With support  Standing - Static: Fair  Standing - Dynamic: Poor     Ambulation/Gait Training:     Gait  Gait Training: Yes  Overall Level of Assistance: Minimum assistance  Distance (ft): 5 Feet  Assistive Device: Walker, rolling  Interventions: Verbal cues;Tactile cues;Manual cues  Speed/Linda: Shuffled;Slow;Pace decreased (< 100 feet/min)  Step Length: Right shortened;Left shortened  Gait Abnormalities: Shuffling gait;Decreased step clearance        Pain:  Intensity Pre-treatment: 0/10

## 2024-12-17 NOTE — PROGRESS NOTES
4 Eyes Skin Assessment     NAME:  Justine Velasco  YOB: 1955  MEDICAL RECORD NUMBER:  [id number]    The patient is being assessed for  Shift Handoff    I agree that at least one RN has performed a thorough Head to Toe Skin Assessment on the patient. ALL assessment sites listed below have been assessed.      Areas assessed by both nurses:    Head, Face, Ears, Shoulders, Back, Chest, Arms, Elbows, Hands, Sacrum. Buttock, Coccyx, Ischium, Legs. Feet and Heels, and Under Medical Devices         Does the Patient have a Wound? No noted wound(s)       Haider Prevention initiated by RN: No  Wound Care Orders initiated by RN: No    Pressure Injury (Stage 3,4, Unstageable, DTI, NWPT, and Complex wounds) if present, place Wound referral order by RN under : No    New Ostomies, if present place, Ostomy referral order under : No     Nurse 1 eSignature: Electronically signed by CELINE MENON RN on 12/16/24 at 8:00 PM EST    **SHARE this note so that the co-signing nurse can place an eSignature**    Nurse 2 eSignature: Electronically signed by Donna Jeffries RN on 12/17/24 at 4:44 AM EST

## 2024-12-18 VITALS
WEIGHT: 134.92 LBS | RESPIRATION RATE: 17 BRPM | HEIGHT: 66 IN | HEART RATE: 90 BPM | SYSTOLIC BLOOD PRESSURE: 143 MMHG | OXYGEN SATURATION: 97 % | DIASTOLIC BLOOD PRESSURE: 82 MMHG | TEMPERATURE: 99.5 F | BODY MASS INDEX: 21.68 KG/M2

## 2024-12-18 LAB
ANION GAP SERPL CALC-SCNC: 3 MMOL/L (ref 3–18)
BUN SERPL-MCNC: 8 MG/DL (ref 7–18)
BUN/CREAT SERPL: 17 (ref 12–20)
CALCIUM SERPL-MCNC: 9.1 MG/DL (ref 8.5–10.1)
CHLORIDE SERPL-SCNC: 106 MMOL/L (ref 100–111)
CO2 SERPL-SCNC: 30 MMOL/L (ref 21–32)
CREAT SERPL-MCNC: 0.46 MG/DL (ref 0.6–1.3)
ECHO BSA: 1.69 M2
GLUCOSE BLD STRIP.AUTO-MCNC: 79 MG/DL (ref 70–110)
GLUCOSE BLD STRIP.AUTO-MCNC: 96 MG/DL (ref 70–110)
GLUCOSE BLD STRIP.AUTO-MCNC: 99 MG/DL (ref 70–110)
GLUCOSE SERPL-MCNC: 138 MG/DL (ref 74–99)
MAGNESIUM SERPL-MCNC: 1.6 MG/DL (ref 1.6–2.6)
POTASSIUM SERPL-SCNC: 2.9 MMOL/L (ref 3.5–5.5)
POTASSIUM SERPL-SCNC: 4.1 MMOL/L (ref 3.5–5.5)
SODIUM SERPL-SCNC: 139 MMOL/L (ref 136–145)
VAS LEFT BULB EDV: 8.6 CM/S
VAS LEFT BULB PSV: 33.1 CM/S
VAS LEFT CCA DIST EDV: 13.7 CM/S
VAS LEFT CCA DIST PSV: 75.8 CM/S
VAS LEFT CCA MID EDV: 16.31 CM/S
VAS LEFT CCA MID PSV: 87.48 CM/S
VAS LEFT CCA PROX EDV: 11.1 CM/S
VAS LEFT CCA PROX PSV: 91.4 CM/S
VAS LEFT ECA EDV: 0 CM/S
VAS LEFT ECA PSV: 54.8 CM/S
VAS LEFT ICA DIST EDV: 30.1 CM/S
VAS LEFT ICA DIST PSV: 89.8 CM/S
VAS LEFT ICA MID EDV: 19.5 CM/S
VAS LEFT ICA MID PSV: 69.4 CM/S
VAS LEFT ICA PROX EDV: 18.4 CM/S
VAS LEFT ICA PROX PSV: 62.9 CM/S
VAS LEFT ICA/CCA PSV: 1.18 NO UNITS
VAS LEFT SUBCLAVIAN PROX EDV: 0 CM/S
VAS LEFT SUBCLAVIAN PROX PSV: 65.9 CM/S
VAS LEFT VERTEBRAL EDV: 11.25 CM/S
VAS LEFT VERTEBRAL PSV: 38.3 CM/S
VAS RIGHT BULB EDV: 15.5 CM/S
VAS RIGHT BULB PSV: 70.3 CM/S
VAS RIGHT CCA DIST EDV: 12.9 CM/S
VAS RIGHT CCA DIST PSV: 67.7 CM/S
VAS RIGHT CCA MID EDV: 16.83 CM/S
VAS RIGHT CCA MID PSV: 82.04 CM/S
VAS RIGHT CCA PROX EDV: 7.7 CM/S
VAS RIGHT CCA PROX PSV: 78.1 CM/S
VAS RIGHT ECA EDV: 6.39 CM/S
VAS RIGHT ECA PSV: 65.1 CM/S
VAS RIGHT ICA DIST EDV: 22 CM/S
VAS RIGHT ICA DIST PSV: 69.2 CM/S
VAS RIGHT ICA MID EDV: 13.6 CM/S
VAS RIGHT ICA MID PSV: 48.5 CM/S
VAS RIGHT ICA PROX EDV: 9.8 CM/S
VAS RIGHT ICA PROX PSV: 45 CM/S
VAS RIGHT ICA/CCA PSV: 1 NO UNITS
VAS RIGHT SUBCLAVIAN PROX EDV: 0 CM/S
VAS RIGHT SUBCLAVIAN PROX PSV: 62.5 CM/S
VAS RIGHT VERTEBRAL EDV: 14.22 CM/S
VAS RIGHT VERTEBRAL PSV: 46.8 CM/S

## 2024-12-18 PROCEDURE — 36415 COLL VENOUS BLD VENIPUNCTURE: CPT

## 2024-12-18 PROCEDURE — 99239 HOSP IP/OBS DSCHRG MGMT >30: CPT | Performed by: FAMILY MEDICINE

## 2024-12-18 PROCEDURE — 83735 ASSAY OF MAGNESIUM: CPT

## 2024-12-18 PROCEDURE — 6370000000 HC RX 637 (ALT 250 FOR IP): Performed by: HOSPITALIST

## 2024-12-18 PROCEDURE — 80048 BASIC METABOLIC PNL TOTAL CA: CPT

## 2024-12-18 PROCEDURE — 84132 ASSAY OF SERUM POTASSIUM: CPT

## 2024-12-18 PROCEDURE — 51702 INSERT TEMP BLADDER CATH: CPT

## 2024-12-18 PROCEDURE — 6370000000 HC RX 637 (ALT 250 FOR IP): Performed by: PHYSICIAN ASSISTANT

## 2024-12-18 PROCEDURE — 6360000002 HC RX W HCPCS: Performed by: HOSPITALIST

## 2024-12-18 PROCEDURE — 93880 EXTRACRANIAL BILAT STUDY: CPT | Performed by: SURGERY

## 2024-12-18 PROCEDURE — 97110 THERAPEUTIC EXERCISES: CPT

## 2024-12-18 PROCEDURE — 6370000000 HC RX 637 (ALT 250 FOR IP): Performed by: STUDENT IN AN ORGANIZED HEALTH CARE EDUCATION/TRAINING PROGRAM

## 2024-12-18 PROCEDURE — 82962 GLUCOSE BLOOD TEST: CPT

## 2024-12-18 PROCEDURE — 6360000002 HC RX W HCPCS: Performed by: STUDENT IN AN ORGANIZED HEALTH CARE EDUCATION/TRAINING PROGRAM

## 2024-12-18 RX ORDER — POTASSIUM CHLORIDE 7.45 MG/ML
10 INJECTION INTRAVENOUS
Status: COMPLETED | OUTPATIENT
Start: 2024-12-18 | End: 2024-12-18

## 2024-12-18 RX ORDER — POLYETHYLENE GLYCOL 3350 17 G/17G
17 POWDER, FOR SOLUTION ORAL DAILY PRN
Qty: 30 PACKET | Refills: 0
Start: 2024-12-18 | End: 2025-01-17

## 2024-12-18 RX ORDER — POTASSIUM CHLORIDE 1500 MG/1
40 TABLET, EXTENDED RELEASE ORAL
Status: COMPLETED | OUTPATIENT
Start: 2024-12-18 | End: 2024-12-18

## 2024-12-18 RX ORDER — INSULIN GLARGINE 100 [IU]/ML
10 INJECTION, SOLUTION SUBCUTANEOUS NIGHTLY
Qty: 10 ML | Refills: 3
Start: 2024-12-18

## 2024-12-18 RX ADMIN — FAMOTIDINE 20 MG: 20 TABLET ORAL at 08:53

## 2024-12-18 RX ADMIN — POTASSIUM CHLORIDE 40 MEQ: 1500 TABLET, EXTENDED RELEASE ORAL at 08:53

## 2024-12-18 RX ADMIN — POTASSIUM CHLORIDE 10 MEQ: 7.46 INJECTION, SOLUTION INTRAVENOUS at 03:13

## 2024-12-18 RX ADMIN — POTASSIUM CHLORIDE 10 MEQ: 7.46 INJECTION, SOLUTION INTRAVENOUS at 04:12

## 2024-12-18 RX ADMIN — POTASSIUM CHLORIDE 10 MEQ: 7.46 INJECTION, SOLUTION INTRAVENOUS at 05:03

## 2024-12-18 RX ADMIN — LOSARTAN POTASSIUM 100 MG: 50 TABLET, FILM COATED ORAL at 08:53

## 2024-12-18 RX ADMIN — MAGNESIUM SULFATE HEPTAHYDRATE 2000 MG: 40 INJECTION, SOLUTION INTRAVENOUS at 06:04

## 2024-12-18 RX ADMIN — ASPIRIN 81 MG: 81 TABLET, COATED ORAL at 08:53

## 2024-12-18 RX ADMIN — POTASSIUM CHLORIDE 40 MEQ: 1500 TABLET, EXTENDED RELEASE ORAL at 04:10

## 2024-12-18 RX ADMIN — ENOXAPARIN SODIUM 40 MG: 100 INJECTION SUBCUTANEOUS at 08:54

## 2024-12-18 ASSESSMENT — PAIN SCALES - GENERAL
PAINLEVEL_OUTOF10: 0

## 2024-12-18 NOTE — PLAN OF CARE
Problem: Discharge Planning  Goal: Discharge to home or other facility with appropriate resources  12/18/2024 1044 by Mariano Fajardo RN  Outcome: Progressing  Flowsheets (Taken 12/17/2024 0750 by Sophia Son, RN)  Discharge to home or other facility with appropriate resources: Identify barriers to discharge with patient and caregiver  Note: Waiting for authorization approval.  12/17/2024 2136 by Chey Echeverria RN  Outcome: Progressing  Flowsheets (Taken 12/17/2024 0750 by Sophia Son, RN)  Discharge to home or other facility with appropriate resources: Identify barriers to discharge with patient and caregiver  Note: Patient waiting on SNF authorization.      Problem: Pain  Goal: Verbalizes/displays adequate comfort level or baseline comfort level  Outcome: Progressing  Flowsheets (Taken 12/14/2024 0823 by Chely Jasso RN)  Verbalizes/displays adequate comfort level or baseline comfort level:   Encourage patient to monitor pain and request assistance   Assess pain using appropriate pain scale   Administer analgesics based on type and severity of pain and evaluate response  Note: Patient educated on pain medication availability per MAR for pain management. Pt verbalized understanding and denies pain.  Pt tolerating pain with prn pain medication.       Problem: Safety - Adult  Goal: Free from fall injury  12/18/2024 1046 by Mariano Fajardo RN  Outcome: Progressing  12/18/2024 1044 by Mariano Fajardo RN  Outcome: Progressing  Flowsheets (Taken 12/17/2024 2136 by Chey Echeverria, RN)  Free From Fall Injury:   Instruct family/caregiver on patient safety   Based on caregiver fall risk screen, instruct family/caregiver to ask for assistance with transferring infant if caregiver noted to have fall risk factors  Note: Educate patient/family to use call bell, bed alarm on, bed on lowest position    12/17/2024 2136 by Chey Echeverria RN  Outcome: Progressing  Flowsheets (Taken 12/17/2024 2136)  Free

## 2024-12-18 NOTE — PROGRESS NOTES
RN called [facility] and give report to Ms Jaimee Geronimo LPN, she was given a chance to ask some question and were answered.

## 2024-12-18 NOTE — PLAN OF CARE
Problem: Chronic Conditions and Co-morbidities  Goal: Patient's chronic conditions and co-morbidity symptoms are monitored and maintained or improved  Outcome: Progressing  Flowsheets (Taken 12/17/2024 0750 by Sophia Son, RN)  Care Plan - Patient's Chronic Conditions and Co-Morbidity Symptoms are Monitored and Maintained or Improved: Monitor and assess patient's chronic conditions and comorbid symptoms for stability, deterioration, or improvement     Problem: Discharge Planning  Goal: Discharge to home or other facility with appropriate resources  Outcome: Progressing  Flowsheets (Taken 12/17/2024 0750 by Sophia Son, RN)  Discharge to home or other facility with appropriate resources: Identify barriers to discharge with patient and caregiver  Note: Patient waiting on SNF authorization.      Problem: Safety - Adult  Goal: Free from fall injury  Outcome: Progressing  Flowsheets (Taken 12/17/2024 2136)  Free From Fall Injury:   Instruct family/caregiver on patient safety   Based on caregiver fall risk screen, instruct family/caregiver to ask for assistance with transferring infant if caregiver noted to have fall risk factors  Note: Patients bed in lowest position, non skid socks on, bed alarm on, and call bell within reach.      Problem: Musculoskeletal - Adult  Goal: Return mobility to safest level of function  Outcome: Progressing  Flowsheets (Taken 12/17/2024 0750 by Sophia Son, RN)  Return Mobility to Safest Level of Function: Assess patient stability and activity tolerance for standing, transferring and ambulating with or without assistive devices  Note: Patient working with PT.      Problem: Musculoskeletal - Adult  Goal: Return ADL status to a safe level of function  Outcome: Progressing  Flowsheets (Taken 12/17/2024 0750 by Sophia Son, RN)  Return ADL Status to a Safe Level of Function: Administer medication as ordered  Note: Patient working with PT.

## 2024-12-18 NOTE — DISCHARGE SUMMARY
Patient is discharged to [facility] and Per Elba Johnson patient has auth and can be transported today.    Patient is agreeable with discharge and per patient she will notify her . JUSTIN Quintana has been notified of patients discharge time.    Discharge order noted for today. Orders received. No other  needs identified at this time. Case management remains available as needed.      Camila Alexander BSW   Case Management

## 2024-12-18 NOTE — PLAN OF CARE
Problem: Physical Therapy - Adult  Goal: By Discharge: Performs mobility at highest level of function for planned discharge setting.  See evaluation for individualized goals.  Description: Physical Therapy Goals:  Initiated 12/13/2024 to be met within 7-10 days.    1.  Patient will move from supine to sit and sit to supine  in bed with contact guard assistance.    2.  Patient will transfer from bed to chair and chair to bed with minimal assistance using the least restrictive device.  3.  Patient will perform sit to stand with minimal assistance.  4.  Patient will ambulate with minimal assistance for 20 feet with the least restrictive device.   5.  Patient will ascend/descend 3 stairs with 2 handrail(s) with moderate assistance.    PLOF:  ( works during the day), 12 steps to enter, ambulates with RW, has had multiple falls in the last year     Outcome: Progressing   PHYSICAL THERAPY TREATMENT    Patient: Justine Velasco (69 y.o. female)  Date: 12/18/2024  Diagnosis: Gait disorder [R26.9]  Neuromuscular weakness (HCC) [G70.9]  Frequent falls [R29.6]  Left leg injury, initial encounter [S89.92XA]  Cerebrovascular accident (CVA), unspecified mechanism (HCC) [I63.9] Multifactorial gait disorder  Precautions: Fall Risk  ASSESSMENT:  Min A for supine to sit. Seated EOB with supervision for balance; however increased postural sway in sitting. Uses BUE to assist with BLE.  RLE with decreased AROM and strength; grossly 2-3/5. No active dorsiflexion/plantarflexion and no hip flexion on left leg. Able to extend leg at left knee; cannot hold against resistance. Attempted sit to stand with 3 attempts. Max A and unable to progress weight anteriorly over BLE to initiate stand. May benefit from 2 person assist d/t fear of falling. May benefit from AFO to left ankle to promote foot clearance. Min A for sit to supine. Seated in bed with HOB elevated. Educated on need for RN assistance with mobility; verbalized  notified  [] Caregiver present  [x] Bed alarm activated  [] Chair alarm activated  [] SCDs applied      COMMUNICATION/EDUCATION:   Patient Education  Education Given To: Patient  Education Provided: Role of Therapy;Plan of Care  Education Method: Demonstration;Verbal;Teach Back  Barriers to Learning: Cognition  Education Outcome: Verbalized understanding;Demonstrated understanding;Continued education needed      Florinda Boyer, PT  Minutes: 23

## 2024-12-18 NOTE — PROGRESS NOTES
4 Eyes Skin Assessment     NAME:  Justine Velasco  YOB: 1955  MEDICAL RECORD NUMBER:  [id number]    The patient is being assessed for  {Reason for Assessment:62244}    I agree that at least one RN has performed a thorough Head to Toe Skin Assessment on the patient. ALL assessment sites listed below have been assessed.      Areas assessed by both nurses:    Head, Face, Ears, Shoulders, Back, Chest, Arms, Elbows, Hands, Sacrum. Buttock, Coccyx, Ischium, Legs. Feet and Heels, and Under Medical Devices         Does the Patient have a Wound? No noted wound(s)       Haider Prevention initiated by RN: No  Wound Care Orders initiated by RN: No    Pressure Injury (Stage 3,4, Unstageable, DTI, NWPT, and Complex wounds) if present, place Wound referral order by RN under : No    New Ostomies, if present place, Ostomy referral order under : No     Nurse 1 eSignature: Electronically signed by CELINE MENON RN on 12/17/24 at 7:45 PM EST    **SHARE this note so that the co-signing nurse can place an eSignature**    Nurse 2 eSignature: {Esignature:[id number]}

## 2024-12-18 NOTE — DISCHARGE SUMMARY
Discharge Summary    Patient: Justine Velasco MRN: [id number]  [facility]: [id number]    YOB: 1955  Age: 69 y.o.  Sex: female    DOA: 12/12/2024 LOS:  LOS: 5 days   Discharge Date: 12/18/2024     Admission Diagnoses: Gait disorder [R26.9]  Neuromuscular weakness (HCC) [G70.9]  Frequent falls [R29.6]  Left leg injury, initial encounter [S89.92XA]  Cerebrovascular accident (CVA), unspecified mechanism (HCC) [I63.9]    Discharge Diagnoses:    Lower extremity weakness and ataxia with recurrent falls, multifactorial  Mild hypernatremia  Hypokalemia  Mild hypercalcemia  Diabetes mellitus type 2  Hypertension    Discharge Condition: Stable    PHYSICAL EXAM  Visit Vitals  /79   Pulse 88   Temp 99 °F (37.2 °C) (Axillary)   Resp 18   Ht 1.676 m (5' 5.98\")   Wt 61.2 kg (134 lb 14.7 oz)   SpO2 96%   BMI 21.79 kg/m²       General: In NAD.  Nontoxic-appearing.  HEENT: NCAT.  Sclerae anicteric, EOMI.    Lungs:  Clear, no wheezes.  No accessory muscle use.  Heart:  RRR.  Abdomen: Soft, NT/ND.  Extremities: Warm, no edema or ischemia.  Psych:   Mood normal.  Neurologic:  Awake and alert.    Hospital Course:   See admission H&P for full details of HPI.  Patient is admitted to the hospital after presenting to the ED for evaluation of progressively worsening bilateral lower extremity weakness.  Symptoms were not new and have reportedly been worsening for the past several years.  Neurology evaluation was obtained.  Usual workup was initiated to rule out neurologic etiology.  Results of testing are as documented below.  Calcium levels were mildly elevated on admission and treated with fluids and have returned to normal range with no worsening.  Other electrolytes and renal function also remained stable.  Ultimately, it has been determined that there is no acute neurologic etiology of patient's weakness.  PT/OT evaluations have been obtained and recommendation was for skilled nursing placement.  Patient has remained quite

## 2024-12-18 NOTE — PROGRESS NOTES
4 Eyes Skin Assessment     NAME:  Justine Velasco  YOB: 1955  MEDICAL RECORD NUMBER:  [id number]    The patient is being assessed for  Shift Handoff    I agree that at least one RN has performed a thorough Head to Toe Skin Assessment on the patient. ALL assessment sites listed below have been assessed.      Areas assessed by both nurses:    Head, Face, Ears, Shoulders, Back, Chest, Arms, Elbows, Hands, Sacrum. Buttock, Coccyx, Ischium, Legs. Feet and Heels, and Under Medical Devices         Does the Patient have a Wound? No noted wound(s)       Haider Prevention initiated by RN: No  Wound Care Orders initiated by RN: No    Pressure Injury (Stage 3,4, Unstageable, DTI, NWPT, and Complex wounds) if present, place Wound referral order by RN under : No    New Ostomies, if present place, Ostomy referral order under : No     Nurse 1 eSignature: Electronically signed by Chey Echeverria RN on 12/18/24 at 7:01 AM EST    **SHARE this note so that the co-signing nurse can place an eSignature**    Nurse 2 eSignature: {Esignature:[id number]}

## 2024-12-18 NOTE — DISCHARGE INSTRUCTIONS
Discussed with the patient and all questioned fully answered. She will call me if any problems arise.    DISCHARGE SUMMARY from Nurse    PATIENT INSTRUCTIONS:    What to do at Home:  Recommended activity: activity as tolerated,     If you experience any of the following symptoms like severe confusion, one sided body weakness please follow up with emergency department or primary MD.    *  Please give a list of your current medications to your Primary Care Provider.    *  Please update this list whenever your medications are discontinued, doses are      changed, or new medications (including over-the-counter products) are added.    *  Please carry medication information at all times in case of emergency situations.    These are general instructions for a healthy lifestyle:    No smoking/ No tobacco products/ Avoid exposure to second hand smoke  Surgeon General's Warning:  Quitting smoking now greatly reduces serious risk to your health.    Obesity, smoking, and sedentary lifestyle greatly increases your risk for illness    A healthy diet, regular physical exercise & weight monitoring are important for maintaining a healthy lifestyle    You may be retaining fluid if you have a history of heart failure or if you experience any of the following symptoms:  Weight gain of 3 pounds or more overnight or 5 pounds in a week, increased swelling in our hands or feet or shortness of breath while lying flat in bed.  Please call your doctor as soon as you notice any of these symptoms; do not wait until your next office visit.        The discharge information has been reviewed with the patient.  The patient verbalized understanding.  Discharge medications reviewed with the patient and appropriate educational materials and side effects teaching were provided.  ___________________________________________________________________________________________________________________________________

## 2024-12-19 NOTE — PROGRESS NOTES
Patient discharged with stable condition, IV catheter removed aseptically, discharge instruction given to patient, personal belongings checked.

## 2025-01-14 NOTE — CARE COORDINATION
Went to patient bedside, HIPAA verified, informed her, her top two choices do not have any open beds at this time. But [facility] has accepted her and will start her insurance auth in the am. Advised she will likely be here 2 more days. She is appreciative, with no further questions or concerns. Left in bed, to continue eating her dinner. Call bell in reach, bed in lowest locked position.    Yes